# Patient Record
Sex: FEMALE | Race: AMERICAN INDIAN OR ALASKA NATIVE | NOT HISPANIC OR LATINO | Employment: UNEMPLOYED | URBAN - METROPOLITAN AREA
[De-identification: names, ages, dates, MRNs, and addresses within clinical notes are randomized per-mention and may not be internally consistent; named-entity substitution may affect disease eponyms.]

---

## 2017-03-13 ENCOUNTER — OFFICE VISIT (OUTPATIENT)
Dept: CARDIOLOGY | Facility: MEDICAL CENTER | Age: 52
End: 2017-03-13
Payer: MEDICAID

## 2017-03-13 VITALS
DIASTOLIC BLOOD PRESSURE: 80 MMHG | SYSTOLIC BLOOD PRESSURE: 110 MMHG | HEIGHT: 65 IN | BODY MASS INDEX: 33.99 KG/M2 | OXYGEN SATURATION: 92 % | HEART RATE: 66 BPM | WEIGHT: 204 LBS

## 2017-03-13 DIAGNOSIS — R07.89 OTHER CHEST PAIN: ICD-10-CM

## 2017-03-13 DIAGNOSIS — Z82.49 FAMILY HISTORY OF CORONARY ARTERY DISEASE: ICD-10-CM

## 2017-03-13 DIAGNOSIS — R94.31 NONSPECIFIC ABNORMAL ELECTROCARDIOGRAM (ECG) (EKG): ICD-10-CM

## 2017-03-13 DIAGNOSIS — Z01.810 PRE-OPERATIVE CARDIOVASCULAR EXAMINATION: ICD-10-CM

## 2017-03-13 LAB — EKG IMPRESSION: NORMAL

## 2017-03-13 PROCEDURE — 93000 ELECTROCARDIOGRAM COMPLETE: CPT | Performed by: INTERNAL MEDICINE

## 2017-03-13 PROCEDURE — 99204 OFFICE O/P NEW MOD 45 MIN: CPT | Performed by: INTERNAL MEDICINE

## 2017-03-13 RX ORDER — DILTIAZEM HYDROCHLORIDE 240 MG/1
240 CAPSULE, EXTENDED RELEASE ORAL DAILY
Status: ON HOLD | COMMUNITY
End: 2018-08-09

## 2017-03-13 ASSESSMENT — ENCOUNTER SYMPTOMS
BACK PAIN: 1
SHORTNESS OF BREATH: 1
PALPITATIONS: 1
ABDOMINAL PAIN: 1

## 2017-03-13 NOTE — Clinical Note
Name:          Sari Ceja   YOB: 1965  Date:     3/13/2017      Mayda Pfeiffer M.D.  [address] [facility] [place] [address]     Babatunde Alvarez MD  [address], [address]  [place] [address]  Phone: [phone]  Back Line: [phone]  Fax: [phone]  E-mail: [email or web address]   Dear Dr. Pfeiffer,    We had the pleasure of seeing your patient, Sari Ceja, in Cardiology Clinic at [facility] today.    As you know, she is a 51-year-old woman with a history of obesity, essential hypertension and inferior Q waves on EKG referred pre-operatively prior to knee arthroplasty.    In speaking with her today, she does tell me about some chest discomfort, which is mildly concerning for angina. In light of her essential hypertension, obesity, and family history of coronary artery disease I do think it would be prudent to check myocardial perfusion imaging prior to her upcoming knee arthroplasty. In less she has a large anterior defect, I would not delay surgery after that study as revascularization has not been shown to reduce the perioperative event rates. However, if she does have a large reversible defect on her nuclear stress test, we may need to delay the surgery. Otherwise, her blood pressure is well controlled at 110/80 mmHg in our office on the combination of lisinopril, HCTZ, and diltiazem. I would recommend holding her lisinopril for a few days prior to surgery to reduce the risk of perioperative hypotension.    We will have the patient follow-up in 3 months, sooner if needed.    Thank you for the referral and please do not hesitate to contact me at any time. My contact information is listed above.    This note was dictated using Dragon speech recognition software.     A full note including my physical examination and a full list of rectified medications is available in our medical record, and can be faxed as well.    Babatunde Alvarez MD  Cardiologist  [facility]  [facility]    cc: Marc Paredes MD

## 2017-03-13 NOTE — PROGRESS NOTES
"Subjective:   Sari Ceja is a 51  -year-old woman with a history of obesity, essential hypertension and inferior Q waves on EKG referred pre-operatively prior to knee arthroplasty.    In speaking with me today, she tells me about a dull left-sided chest discomfort that typically lasts about 30 minutes at a time and is provoked with emotional stress but nonexertional. She has had similar symptoms for the last 3 months with no real increase in frequency. They presently occur about once per month. She also tells me that she has palpitations, \"fluttering of my heart.\" She has infrequent episodes the last of which was about a month ago. Apart from that, cardiac vascular review systems is negative. She has mild, chronic exertional dyspnea that she attributes to her osteoarthritis (perhaps deconditioning). Apart from that, cardiac vascular review systems is negative.    She takes care of her blind mother and her sister who is wheelchair bound related to joint disease. She is , and has a significant other who causes her some emotional stress. She is a nonsmoker.    Past Medical History   Diagnosis Date   • Arthritis    • Hypertension    • Dental disorder      Broken teeth   • Pain      both knees, right shoulder, hip area   • Renal disorder 2005     kidney infection   • Unspecified hemorrhagic conditions (CMS-HCC) 2005     \"pulled IV from groin, had to hold pressure to stop bleeding and stayed additional day in the hospital\"     Past Surgical History   Procedure Laterality Date   • Incision and drainage general  1993     from infected tooth   • Hip arthroscopy  12/5/2013     Performed by Doni Merida M.D. at [facility]   • Debridement  12/5/2013     Performed by Doni Merida M.D. at [facility]   • Orthopedic osteotomy  12/5/2013     Performed by Doni Merida M.D. at [facility]     Family History   Problem Relation Age of Onset   • Heart Disease     • " "Hypertension     • Cancer       History   Smoking status   • Former Smoker -- 0.60 packs/day for 10 years   • Quit date: 04/01/2006   Smokeless tobacco   • Never Used     No Known Allergies  Outpatient Encounter Prescriptions as of 3/13/2017   Medication Sig Dispense Refill   • diltiazem (TAZTIA XT) 240 MG SR capsule Take 240 mg by mouth every day.     • Pregabalin (LYRICA PO) Take  by mouth.     • Cholecalciferol 2000 UNIT TABS Take  by mouth every day.     • cyclobenzaprine (FLEXERIL) 10 MG TABS Take 10 mg by mouth 3 times a day as needed.     • Multiple Vitamins-Minerals (ONE-A-DAY WOMENS 50+ ADVANTAGE PO) Take  by mouth every day.     • hydrocodone/acetaminophen (NORCO)  MG TABS Take 1-2 Tabs by mouth every 6 hours as needed.     • lisinopril (PRINIVIL) 20 MG TABS Take 20 mg by mouth 1 time daily as needed.     • hydrochlorothiazide (HYDRODIURIL) 25 MG TABS Take 25 mg by mouth every day.     • diltiazem CD (CARDIZEM CD) 240 MG CP24 Take 240 mg by mouth every day.     • gabapentin (NEURONTIN) 600 MG tablet Take 600 mg by mouth 3 times a day.     • nabumetone (RELAFEN) 500 MG TABS Take 500 mg by mouth as needed.       No facility-administered encounter medications on file as of 3/13/2017.     Review of Systems   Respiratory: Positive for shortness of breath.    Cardiovascular: Positive for chest pain and palpitations.   Gastrointestinal: Positive for abdominal pain.   Musculoskeletal: Positive for back pain and joint pain.   All other systems reviewed and are negative.       Objective:   /80 mmHg  Pulse 66  Ht 1.651 m (5' 5\")  Wt 92.534 kg (204 lb)  BMI 33.95 kg/m2  SpO2 92%    Physical Exam   Constitutional: She is oriented to person, place, and time. She appears well-developed and well-nourished. No distress.   Pleasant, obese, middle-age woman in no distress   HENT:   Head: Normocephalic and atraumatic.   Eyes: Conjunctivae and EOM are normal. Pupils are equal, round, and reactive to light. No " scleral icterus.   Neck: Neck supple. No JVD present. No tracheal deviation present.   Cardiovascular: Normal rate, regular rhythm, normal heart sounds and intact distal pulses.  Exam reveals no gallop and no friction rub.    No murmur heard.  Pulses:       Dorsalis pedis pulses are 2+ on the right side, and 2+ on the left side.   No carotid bruits   Pulmonary/Chest: Effort normal and breath sounds normal. No stridor. No respiratory distress. She has no wheezes. She has no rales.   Abdominal: Soft. Bowel sounds are normal. She exhibits no distension.   Musculoskeletal: She exhibits no edema.   Longitudinal anterior scar noted on her left knee well-healed without any signs of infection. Right knee brace noted as well. Full exam deferred.   Neurological: She is alert and oriented to person, place, and time.   Skin: Skin is warm and dry. No rash noted. She is not diaphoretic. No erythema. No pallor.   Psychiatric: She has a normal mood and affect. Judgment and thought content normal.   Vitals reviewed.      Assessment:     1. Nonspecific abnormal electrocardiogram (ECG) (EKG)  EKG    NM-CARDIAC STRESS TEST   2. Other chest pain  NM-CARDIAC STRESS TEST   3. Pre-operative cardiovascular examination  NM-CARDIAC STRESS TEST   4. Family history of coronary artery disease         Medical Decision Making:  Today's Assessment / Status / Plan:     Medical Decision Making:    In speaking with her today, she does tell me about some chest discomfort, which is mildly concerning for angina. In light of her essential hypertension, obesity, and family history of coronary artery disease I do think it would be prudent to check myocardial perfusion imaging prior to her upcoming knee arthroplasty. In less she has a large anterior defect, I would not delay surgery after that study as revascularization has not been shown to reduce the perioperative event rates. However, if she does have a large reversible defect on her nuclear stress test,  we may need to delay the surgery. Otherwise, her blood pressure is well controlled at 110/80 mmHg in our office on the combination of lisinopril, HCTZ, and diltiazem. I would recommend holding her lisinopril for a few days prior to surgery to reduce the risk of perioperative hypotension.    Babatunde Alvarez MD  Cardiologist, [facility] [place]

## 2017-03-13 NOTE — PROGRESS NOTES
Name:          Sari Ceja   YOB: 1965  Date:     3/13/2017      Mayda Pfeiffer M.D.  [address] [facility] [place] [address]     Babatunde Alvarez MD  [address], [address]  [place] [address]  Phone: [phone]  Back Line: [phone]  Fax: [phone]  E-mail: [email or web address]   Dear Dr. Pfeiffer,    We had the pleasure of seeing your patient, Sari Ceja, in Cardiology Clinic at [facility] today.    As you know, she is a 51-year-old woman with a history of obesity, essential hypertension and inferior Q waves on EKG referred pre-operatively prior to knee arthroplasty.    In speaking with her today, she does tell me about some chest discomfort, which is mildly concerning for angina. In light of her essential hypertension, obesity, and family history of coronary artery disease I do think it would be prudent to check myocardial perfusion imaging prior to her upcoming knee arthroplasty. In less she has a large anterior defect, I would not delay surgery after that study as revascularization has not been shown to reduce the perioperative event rates. However, if she does have a large reversible defect on her nuclear stress test, we may need to delay the surgery. Otherwise, her blood pressure is well controlled at 110/80 mmHg in our office on the combination of lisinopril, HCTZ, and diltiazem. I would recommend holding her lisinopril for a few days prior to surgery to reduce the risk of perioperative hypotension.    We will have the patient follow-up in 3 months, sooner if needed.    Thank you for the referral and please do not hesitate to contact me at any time. My contact information is listed above.    This note was dictated using Dragon speech recognition software.     A full note including my physical examination and a full list of rectified medications is available in our medical record, and can be faxed as well.    Babatunde Alvarez MD  Cardiologist  [facility] for Heart  and Vascular Health

## 2017-03-27 PROBLEM — I10 ESSENTIAL HYPERTENSION: Status: ACTIVE | Noted: 2017-03-27

## 2017-04-08 ENCOUNTER — HOSPITAL ENCOUNTER (OUTPATIENT)
Dept: RADIOLOGY | Facility: MEDICAL CENTER | Age: 52
End: 2017-04-08
Attending: INTERNAL MEDICINE
Payer: MEDICAID

## 2017-04-08 DIAGNOSIS — R07.89 OTHER CHEST PAIN: ICD-10-CM

## 2017-04-08 DIAGNOSIS — R94.31 NONSPECIFIC ABNORMAL ELECTROCARDIOGRAM (ECG) (EKG): ICD-10-CM

## 2017-04-08 DIAGNOSIS — Z01.810 PRE-OPERATIVE CARDIOVASCULAR EXAMINATION: ICD-10-CM

## 2017-04-08 PROCEDURE — 700111 HCHG RX REV CODE 636 W/ 250 OVERRIDE (IP)

## 2017-04-08 PROCEDURE — A9502 TC99M TETROFOSMIN: HCPCS

## 2017-04-08 RX ORDER — REGADENOSON 0.08 MG/ML
INJECTION, SOLUTION INTRAVENOUS
Status: COMPLETED
Start: 2017-04-08 | End: 2017-04-08

## 2017-04-08 RX ADMIN — REGADENOSON 0.4 MG: 0.08 INJECTION, SOLUTION INTRAVENOUS at 09:47

## 2017-04-10 ENCOUNTER — OFFICE VISIT (OUTPATIENT)
Dept: CARDIOLOGY | Facility: MEDICAL CENTER | Age: 52
End: 2017-04-10
Payer: MEDICAID

## 2017-04-10 VITALS
HEART RATE: 68 BPM | DIASTOLIC BLOOD PRESSURE: 88 MMHG | WEIGHT: 201 LBS | OXYGEN SATURATION: 93 % | SYSTOLIC BLOOD PRESSURE: 136 MMHG | BODY MASS INDEX: 33.49 KG/M2 | HEIGHT: 65 IN

## 2017-04-10 DIAGNOSIS — Z82.49 FAMILY HISTORY OF CORONARY ARTERY DISEASE: ICD-10-CM

## 2017-04-10 DIAGNOSIS — R00.2 PALPITATIONS: ICD-10-CM

## 2017-04-10 DIAGNOSIS — M25.561 ARTHRALGIA OF KNEE, RIGHT: ICD-10-CM

## 2017-04-10 DIAGNOSIS — I10 ESSENTIAL HYPERTENSION: ICD-10-CM

## 2017-04-10 PROCEDURE — 99214 OFFICE O/P EST MOD 30 MIN: CPT | Performed by: NURSE PRACTITIONER

## 2017-04-10 RX ORDER — DICLOFENAC SODIUM 75 MG/1
75 TABLET, DELAYED RELEASE ORAL 2 TIMES DAILY
Refills: 2 | Status: ON HOLD | COMMUNITY
Start: 2017-02-28 | End: 2018-07-20

## 2017-04-10 ASSESSMENT — ENCOUNTER SYMPTOMS
ABDOMINAL PAIN: 0
WEAKNESS: 0
CLAUDICATION: 0
MYALGIAS: 0
BACK PAIN: 1
DIZZINESS: 0
PND: 0
PALPITATIONS: 1
SHORTNESS OF BREATH: 0
ORTHOPNEA: 0
COUGH: 0

## 2017-04-10 NOTE — PROGRESS NOTES
"Subjective:   Sari Ceja is a 51 y.o. female who presents today for surgical clearance. She is planning on undergoing a right knee replacement by Dr. Paredes but no surgery date is set. She was seen by Dr. Babatunde Alvarez back in March for evaluation. She had complained of some minor exertional chest discomfort and some palpitations. Since she has family history of heart disease he ordered a stress test to be done prior to her surgery. She is here for the results.    She states she has a fluttering sensation in her chest that last only a few seconds which usually occurs well she is driving. She has some minor chest discomfort with exertion but no associated symptoms.    She complains of pain in her right knee and takes anti-inflammatory. She has already had her left knee replaced. She does have some chronic back pain for which she will see Dr. Mayes for epidural injections.    Past Medical History   Diagnosis Date   • Arthritis    • Hypertension    • Dental disorder      Broken teeth   • Pain      both knees, right shoulder, hip area   • Renal disorder 2005     kidney infection   • Unspecified hemorrhagic conditions (CMS-HCC) 2005     \"pulled IV from groin, had to hold pressure to stop bleeding and stayed additional day in the hospital\"     Past Surgical History   Procedure Laterality Date   • Incision and drainage general  1993     from infected tooth   • Hip arthroscopy  12/5/2013     Performed by Doni Merida M.D. at [facility]   • Debridement  12/5/2013     Performed by Doni Merida M.D. at [facility]   • Orthopedic osteotomy  12/5/2013     Performed by Doni Merida M.D. at [facility]     Family History   Problem Relation Age of Onset   • Heart Disease     • Hypertension     • Cancer       History   Smoking status   • Former Smoker -- 0.60 packs/day for 10 years   • Quit date: 04/01/2006   Smokeless tobacco   • Never Used     No Known Allergies  Outpatient " "Encounter Prescriptions as of 4/10/2017   Medication Sig Dispense Refill   • diclofenac EC (VOLTAREN) 75 MG Tablet Delayed Response   2   • LYRICA 150 MG Cap   2   • hydrocodone/acetaminophen (NORCO)  MG TABS Take 1-2 Tabs by mouth every 6 hours as needed.     • lisinopril (PRINIVIL) 20 MG TABS Take 20 mg by mouth 1 time daily as needed.     • hydrochlorothiazide (HYDRODIURIL) 25 MG TABS Take 25 mg by mouth every day.     • diltiazem (TAZTIA XT) 240 MG SR capsule Take 240 mg by mouth every day.     • [DISCONTINUED] Pregabalin (LYRICA PO) Take  by mouth.     • Cholecalciferol 2000 UNIT TABS Take  by mouth every day.     • cyclobenzaprine (FLEXERIL) 10 MG TABS Take 10 mg by mouth 3 times a day as needed.     • Multiple Vitamins-Minerals (ONE-A-DAY WOMENS 50+ ADVANTAGE PO) Take  by mouth every day.     • [DISCONTINUED] gabapentin (NEURONTIN) 600 MG tablet Take 600 mg by mouth 3 times a day.     • [DISCONTINUED] nabumetone (RELAFEN) 500 MG TABS Take 500 mg by mouth as needed.       No facility-administered encounter medications on file as of 4/10/2017.     Review of Systems   Constitutional: Negative for malaise/fatigue.   Respiratory: Negative for cough and shortness of breath.    Cardiovascular: Positive for palpitations (rare fluttering.). Negative for chest pain, orthopnea, claudication, leg swelling and PND.   Gastrointestinal: Negative for abdominal pain.   Musculoskeletal: Positive for back pain (chronic , gets epidurals.) and joint pain (right knee arthritis.). Negative for myalgias.   Neurological: Negative for dizziness and weakness.        Objective:   /88 mmHg  Pulse 68  Ht 1.651 m (5' 5\")  Wt 91.173 kg (201 lb)  BMI 33.45 kg/m2  SpO2 93%    Physical Exam   Constitutional: She is oriented to person, place, and time. She appears well-developed and well-nourished.   HENT:   Head: Normocephalic.   Eyes: Conjunctivae are normal.   Neck: No JVD present. No thyromegaly present.   Cardiovascular: " Normal rate, regular rhythm, S1 normal, S2 normal and normal heart sounds.  Exam reveals no gallop, no S3, no S4 and no friction rub.    No murmur heard.  Pulmonary/Chest: Effort normal and breath sounds normal. No respiratory distress. She has no wheezes. She has no rales.   Abdominal: Soft. Bowel sounds are normal. She exhibits no distension. There is no tenderness.   Central obesity.   Musculoskeletal: She exhibits no edema.   Neurological: She is alert and oriented to person, place, and time.   Skin: Skin is warm and dry.   Psychiatric: She has a normal mood and affect.     April 8, 2017: Myocardial Perfusion Report:   NUCLEAR IMAGING INTERPRETATION   No reversible defects that would indicate ischemia.    Normal left ventricular wall motion, with EF of 71%.    ECG INTERPRETATION   Negative stress ECG for ischemia.    Assessment:     1. Palpitations     2. Family history of coronary artery disease     3. Essential hypertension     4. Arthralgia of knee, right         Medical Decision Making:  Today's Assessment / Status / Plan:     Palpitations: She is a rare fluttering sensation in her chest which sounds like it may be ectopy. She's not had any rapid, sustained heartbeats.    Family history of coronary artery disease: Patient had some minor chest discomfort and a fluttering sensation in her chest therefore she underwent a nuclear stress test which was negative for ischemia.    Hypertension: Her blood pressure is office today. Continue on current regimen. Per Dr. Babatunde Alvarez he suggested that she hold her lisinopril prior to surgery.    Right knee pain: She is planning on undergoing a right knee replacement with Dr. Paredes. I consulted with Dr. Julianna Alvarez who agrees that the patient can undergo her knee surgery and she is at low risk.    She may undergo her right knee replacement. If there are any cardiovascular problems or complications they are free to contact our office. She does not have a true cardiac  problem that we would need to follow therefore she will follow-up as needed. However she isn't was welcome to return to our practice if necessary.    Collaborating Provider: Dr. Julianna Alvarez.

## 2017-04-10 NOTE — MR AVS SNAPSHOT
"        Sari Ceja   4/10/2017 1:20 PM   Office Visit   MRN: [id number]    Department:  [facility]   Dept Phone:  [phone]    Description:  Female : 1965   Provider:  JOVANA Ziegler           Reason for Visit     Medical Clearance           Allergies as of 4/10/2017     No Known Allergies      You were diagnosed with     Arthralgia of knee, right   [923181]       Essential hypertension   [1940458]         Vital Signs     Blood Pressure Pulse Height Weight Body Mass Index Oxygen Saturation    136/88 mmHg 68 1.651 m (5' 5\") 91.173 kg (201 lb) 33.45 kg/m2 93%    Smoking Status                   Former Smoker           Basic Information     Date Of Birth Sex Race Ethnicity Preferred Language    1965 Female White Non- English      Your appointments     2017 12:45 PM   FOLLOW UP with Babatunde Alvarez M.D.   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      Below and/or attached are the medications your provider expects you to take. Review all of your home medications and newly ordered medications with your provider and/or pharmacist. Follow medication instructions as directed by your provider and/or pharmacist. Please keep your " medication list with you and share with your provider. Update the information when medications are discontinued, doses are changed, or new medications (including over-the-counter products) are added; and carry medication information at all times in the event of emergency situations     Allergies:  No Known Allergies          Medications  Valid as of: April 10, 2017 -  2:24 PM    Generic Name Brand Name Tablet Size Instructions for use    Cholecalciferol (Tab) Cholecalciferol 2000 UNIT Take  by mouth every day.        Cyclobenzaprine HCl (Tab) FLEXERIL 10 MG Take 10 mg by mouth 3 times a day as needed.        Diclofenac Sodium (Tablet Delayed Response) VOLTAREN 75 MG         DiltiaZEM HCl ER Beads (CAPSULE SR 24 HR) TIAZAC 240 MG Take 240 mg by mouth every day.        HydroCHLOROthiazide (Tab) HYDRODIURIL 25 MG Take 25 mg by mouth every day.        Hydrocodone-Acetaminophen (Tab) NORCO  MG Take 1-2 Tabs by mouth every 6 hours as needed.        Lisinopril (Tab) PRINIVIL 20 MG Take 20 mg by mouth 1 time daily as needed.        Multiple Vitamins-Minerals   Take  by mouth every day.        Pregabalin (Cap) LYRICA 150 MG         .                 Medicines prescribed today were sent to:     [facility] [place] - [address]    [address] [place] [address]    Phone: [phone] Fax: [phone]    Open 24 Hours?: No      Medication refill instructions:       If your prescription bottle indicates you have medication refills left, it is not necessary to call your provider’s office. Please contact your pharmacy and they will refill your medication.    If your prescription bottle indicates you do not have any refills left, you may request refills at any time through one of the following ways: The online [employer] system (except Urgent Care), by calling your provider’s office, or by asking your pharmacy to contact your provider’s office with a refill request. Medication  refills are processed only during regular business hours and may not be available until the next business day. Your provider may request additional information or to have a follow-up visit with you prior to refilling your medication.   *Please Note: Medication refills are assigned a new Rx number when refilled electronically. Your pharmacy may indicate that no refills were authorized even though a new prescription for the same medication is available at the pharmacy. Please request the medicine by name with the pharmacy before contacting your provider for a refill.           [employer] Access Code: Activation code not generated  Current [employer] Status: Active

## 2017-04-10 NOTE — Clinical Note
"     [facility]   [address], [address]  [place] [address]  Phone: [phone]  Fax: [phone]              Sari Ceja  1965    Encounter Date: 4/10/2017    JOVANA Ziegler          PROGRESS NOTE:  Subjective:   Sari Ceja is a 51 y.o. female who presents today for surgical clearance. She is planning on undergoing a right knee replacement by Dr. Paredes but no surgery date is set. She was seen by Dr. Babatunde Alvarez back in March for evaluation. She had complained of some minor exertional chest discomfort and some palpitations. Since she has family history of heart disease he ordered a stress test to be done prior to her surgery. She is here for the results.    She states she has a fluttering sensation in her chest that last only a few seconds which usually occurs well she is driving. She has some minor chest discomfort with exertion but no associated symptoms.    She complains of pain in her right knee and takes anti-inflammatory. She has already had her left knee replaced. She does have some chronic back pain for which she will see Dr. Mayes for epidural injections.    Past Medical History   Diagnosis Date   • Arthritis    • Hypertension    • Dental disorder      Broken teeth   • Pain      both knees, right shoulder, hip area   • Renal disorder 2005     kidney infection   • Unspecified hemorrhagic conditions (CMS-HCC) 2005     \"pulled IV from groin, had to hold pressure to stop bleeding and stayed additional day in the hospital\"     Past Surgical History   Procedure Laterality Date   • Incision and drainage general  1993     from infected tooth   • Hip arthroscopy  12/5/2013     Performed by Doni Merida M.D. at [facility]   • Debridement  12/5/2013     Performed by Doni Merida M.D. at [facility]   • Orthopedic osteotomy  12/5/2013     Performed by Doni Merida M.D. at [facility]     Family " "History   Problem Relation Age of Onset   • Heart Disease     • Hypertension     • Cancer       History   Smoking status   • Former Smoker -- 0.60 packs/day for 10 years   • Quit date: 04/01/2006   Smokeless tobacco   • Never Used     No Known Allergies  Outpatient Encounter Prescriptions as of 4/10/2017   Medication Sig Dispense Refill   • diclofenac EC (VOLTAREN) 75 MG Tablet Delayed Response   2   • LYRICA 150 MG Cap   2   • hydrocodone/acetaminophen (NORCO)  MG TABS Take 1-2 Tabs by mouth every 6 hours as needed.     • lisinopril (PRINIVIL) 20 MG TABS Take 20 mg by mouth 1 time daily as needed.     • hydrochlorothiazide (HYDRODIURIL) 25 MG TABS Take 25 mg by mouth every day.     • diltiazem (TAZTIA XT) 240 MG SR capsule Take 240 mg by mouth every day.     • [DISCONTINUED] Pregabalin (LYRICA PO) Take  by mouth.     • Cholecalciferol 2000 UNIT TABS Take  by mouth every day.     • cyclobenzaprine (FLEXERIL) 10 MG TABS Take 10 mg by mouth 3 times a day as needed.     • Multiple Vitamins-Minerals (ONE-A-DAY WOMENS 50+ ADVANTAGE PO) Take  by mouth every day.     • [DISCONTINUED] gabapentin (NEURONTIN) 600 MG tablet Take 600 mg by mouth 3 times a day.     • [DISCONTINUED] nabumetone (RELAFEN) 500 MG TABS Take 500 mg by mouth as needed.       No facility-administered encounter medications on file as of 4/10/2017.     Review of Systems   Constitutional: Negative for malaise/fatigue.   Respiratory: Negative for cough and shortness of breath.    Cardiovascular: Positive for palpitations (rare fluttering.). Negative for chest pain, orthopnea, claudication, leg swelling and PND.   Gastrointestinal: Negative for abdominal pain.   Musculoskeletal: Positive for back pain (chronic , gets epidurals.) and joint pain (right knee arthritis.). Negative for myalgias.   Neurological: Negative for dizziness and weakness.        Objective:   /88 mmHg  Pulse 68  Ht 1.651 m (5' 5\")  Wt 91.173 kg (201 lb)  BMI 33.45 kg/m2  " SpO2 93%    Physical Exam   Constitutional: She is oriented to person, place, and time. She appears well-developed and well-nourished.   HENT:   Head: Normocephalic.   Eyes: Conjunctivae are normal.   Neck: No JVD present. No thyromegaly present.   Cardiovascular: Normal rate, regular rhythm, S1 normal, S2 normal and normal heart sounds.  Exam reveals no gallop, no S3, no S4 and no friction rub.    No murmur heard.  Pulmonary/Chest: Effort normal and breath sounds normal. No respiratory distress. She has no wheezes. She has no rales.   Abdominal: Soft. Bowel sounds are normal. She exhibits no distension. There is no tenderness.   Central obesity.   Musculoskeletal: She exhibits no edema.   Neurological: She is alert and oriented to person, place, and time.   Skin: Skin is warm and dry.   Psychiatric: She has a normal mood and affect.     April 8, 2017: Myocardial Perfusion Report:   NUCLEAR IMAGING INTERPRETATION   No reversible defects that would indicate ischemia.    Normal left ventricular wall motion, with EF of 71%.    ECG INTERPRETATION   Negative stress ECG for ischemia.    Assessment:     1. Palpitations     2. Family history of coronary artery disease     3. Essential hypertension     4. Arthralgia of knee, right         Medical Decision Making:  Today's Assessment / Status / Plan:     Palpitations: She is a rare fluttering sensation in her chest which sounds like it may be ectopy. She's not had any rapid, sustained heartbeats.    Family history of coronary artery disease: Patient had some minor chest discomfort and a fluttering sensation in her chest therefore she underwent a nuclear stress test which was negative for ischemia.    Hypertension: Her blood pressure is office today. Continue on current regimen. Per Dr. Babatunde Alvarez he suggested that she hold her lisinopril prior to surgery.    Right knee pain: She is planning on undergoing a right knee replacement with Dr. Paredes. I consulted with Dr. Levine  Antonio who agrees that the patient can undergo her knee surgery and she is at low risk.    She may undergo her right knee replacement. If there are any cardiovascular problems or complications they are free to contact our office. She does not have a true cardiac problem that we would need to follow therefore she will follow-up as needed. However she isn't was welcome to return to our practice if necessary.    Collaborating Provider: Dr. Julianna Alvarez.        No Recipients

## 2018-05-17 ENCOUNTER — HOSPITAL ENCOUNTER (OUTPATIENT)
Dept: RADIOLOGY | Facility: MEDICAL CENTER | Age: 53
End: 2018-05-17
Attending: PHYSICAL MEDICINE & REHABILITATION
Payer: MEDICAID

## 2018-05-17 DIAGNOSIS — M54.16 LUMBAR RADICULITIS: ICD-10-CM

## 2018-05-17 PROCEDURE — 72148 MRI LUMBAR SPINE W/O DYE: CPT

## 2018-07-12 ENCOUNTER — HOSPITAL ENCOUNTER (OUTPATIENT)
Dept: RADIOLOGY | Facility: MEDICAL CENTER | Age: 53
End: 2018-07-12
Attending: NEUROLOGICAL SURGERY
Payer: MEDICAID

## 2018-07-12 ENCOUNTER — HOSPITAL ENCOUNTER (OUTPATIENT)
Dept: RADIOLOGY | Facility: MEDICAL CENTER | Age: 53
End: 2018-07-12
Attending: NEUROLOGICAL SURGERY | Admitting: NEUROLOGICAL SURGERY
Payer: MEDICAID

## 2018-07-12 DIAGNOSIS — Z01.810 PRE-OPERATIVE CARDIOVASCULAR EXAMINATION: ICD-10-CM

## 2018-07-12 DIAGNOSIS — Z01.811 PRE-OPERATIVE RESPIRATORY EXAMINATION: ICD-10-CM

## 2018-07-12 DIAGNOSIS — M48.062 PSEUDOCLAUDICATION SYNDROME: ICD-10-CM

## 2018-07-12 DIAGNOSIS — Z01.812 PRE-OPERATIVE LABORATORY EXAMINATION: ICD-10-CM

## 2018-07-12 LAB
ABO GROUP BLD: NORMAL
ANION GAP SERPL CALC-SCNC: 8 MMOL/L (ref 0–11.9)
APPEARANCE UR: CLEAR
APTT PPP: 30.8 SEC (ref 24.7–36)
BASOPHILS # BLD AUTO: 0.7 % (ref 0–1.8)
BASOPHILS # BLD: 0.05 K/UL (ref 0–0.12)
BILIRUB UR QL STRIP.AUTO: NEGATIVE
BLD GP AB SCN SERPL QL: NORMAL
BUN SERPL-MCNC: 15 MG/DL (ref 8–22)
CALCIUM SERPL-MCNC: 9.5 MG/DL (ref 8.5–10.5)
CHLORIDE SERPL-SCNC: 104 MMOL/L (ref 96–112)
CO2 SERPL-SCNC: 26 MMOL/L (ref 20–33)
COLOR UR: YELLOW
CREAT SERPL-MCNC: 0.83 MG/DL (ref 0.5–1.4)
EKG IMPRESSION: NORMAL
EOSINOPHIL # BLD AUTO: 0.3 K/UL (ref 0–0.51)
EOSINOPHIL NFR BLD: 4.1 % (ref 0–6.9)
ERYTHROCYTE [DISTWIDTH] IN BLOOD BY AUTOMATED COUNT: 40.6 FL (ref 35.9–50)
GLUCOSE SERPL-MCNC: 105 MG/DL (ref 65–99)
GLUCOSE UR STRIP.AUTO-MCNC: NEGATIVE MG/DL
HCT VFR BLD AUTO: 43.3 % (ref 37–47)
HGB BLD-MCNC: 15.1 G/DL (ref 12–16)
IMM GRANULOCYTES # BLD AUTO: 0.05 K/UL (ref 0–0.11)
IMM GRANULOCYTES NFR BLD AUTO: 0.7 % (ref 0–0.9)
INR PPP: 1.04 (ref 0.87–1.13)
KETONES UR STRIP.AUTO-MCNC: ABNORMAL MG/DL
LEUKOCYTE ESTERASE UR QL STRIP.AUTO: NEGATIVE
LYMPHOCYTES # BLD AUTO: 2.29 K/UL (ref 1–4.8)
LYMPHOCYTES NFR BLD: 31 % (ref 22–41)
MCH RBC QN AUTO: 30.6 PG (ref 27–33)
MCHC RBC AUTO-ENTMCNC: 34.9 G/DL (ref 33.6–35)
MCV RBC AUTO: 87.7 FL (ref 81.4–97.8)
MICRO URNS: ABNORMAL
MONOCYTES # BLD AUTO: 0.52 K/UL (ref 0–0.85)
MONOCYTES NFR BLD AUTO: 7 % (ref 0–13.4)
NEUTROPHILS # BLD AUTO: 4.18 K/UL (ref 2–7.15)
NEUTROPHILS NFR BLD: 56.5 % (ref 44–72)
NITRITE UR QL STRIP.AUTO: NEGATIVE
NRBC # BLD AUTO: 0 K/UL
NRBC BLD-RTO: 0 /100 WBC
PH UR STRIP.AUTO: 6 [PH]
PLATELET # BLD AUTO: 287 K/UL (ref 164–446)
PMV BLD AUTO: 11.1 FL (ref 9–12.9)
POTASSIUM SERPL-SCNC: 3.6 MMOL/L (ref 3.6–5.5)
PROT UR QL STRIP: NEGATIVE MG/DL
PROTHROMBIN TIME: 13.3 SEC (ref 12–14.6)
RBC # BLD AUTO: 4.94 M/UL (ref 4.2–5.4)
RBC UR QL AUTO: NEGATIVE
RH BLD: NORMAL
SODIUM SERPL-SCNC: 138 MMOL/L (ref 135–145)
SP GR UR STRIP.AUTO: 1.02
UROBILINOGEN UR STRIP.AUTO-MCNC: 1 MG/DL
WBC # BLD AUTO: 7.4 K/UL (ref 4.8–10.8)

## 2018-07-12 PROCEDURE — 85730 THROMBOPLASTIN TIME PARTIAL: CPT

## 2018-07-12 PROCEDURE — 86900 BLOOD TYPING SEROLOGIC ABO: CPT

## 2018-07-12 PROCEDURE — 80048 BASIC METABOLIC PNL TOTAL CA: CPT

## 2018-07-12 PROCEDURE — 81003 URINALYSIS AUTO W/O SCOPE: CPT

## 2018-07-12 PROCEDURE — 86850 RBC ANTIBODY SCREEN: CPT

## 2018-07-12 PROCEDURE — 86901 BLOOD TYPING SEROLOGIC RH(D): CPT

## 2018-07-12 PROCEDURE — 93010 ELECTROCARDIOGRAM REPORT: CPT | Performed by: INTERNAL MEDICINE

## 2018-07-12 PROCEDURE — 93005 ELECTROCARDIOGRAM TRACING: CPT

## 2018-07-12 PROCEDURE — 85025 COMPLETE CBC W/AUTO DIFF WBC: CPT

## 2018-07-12 PROCEDURE — 85610 PROTHROMBIN TIME: CPT

## 2018-07-12 PROCEDURE — 36415 COLL VENOUS BLD VENIPUNCTURE: CPT

## 2018-07-12 PROCEDURE — 72110 X-RAY EXAM L-2 SPINE 4/>VWS: CPT

## 2018-07-12 RX ORDER — GABAPENTIN 600 MG/1
600 TABLET ORAL 3 TIMES DAILY
Status: ON HOLD | COMMUNITY
End: 2018-08-09

## 2018-07-13 ENCOUNTER — HOSPITAL ENCOUNTER (OUTPATIENT)
Dept: RADIOLOGY | Facility: MEDICAL CENTER | Age: 53
End: 2018-07-13
Attending: NEUROLOGICAL SURGERY
Payer: MEDICAID

## 2018-07-13 DIAGNOSIS — M48.062 SPINAL STENOSIS OF LUMBAR REGION WITH NEUROGENIC CLAUDICATION: ICD-10-CM

## 2018-07-13 PROCEDURE — 93970 EXTREMITY STUDY: CPT

## 2018-07-13 PROCEDURE — 93970 EXTREMITY STUDY: CPT | Mod: 26 | Performed by: SURGERY

## 2018-07-17 ENCOUNTER — APPOINTMENT (OUTPATIENT)
Dept: RADIOLOGY | Facility: MEDICAL CENTER | Age: 53
End: 2018-07-17
Attending: NEUROLOGICAL SURGERY
Payer: MEDICAID

## 2018-07-17 ENCOUNTER — HOSPITAL ENCOUNTER (OUTPATIENT)
Facility: MEDICAL CENTER | Age: 53
End: 2018-07-23
Attending: NEUROLOGICAL SURGERY | Admitting: NEUROLOGICAL SURGERY
Payer: MEDICAID

## 2018-07-17 DIAGNOSIS — M48.062 LUMBAR STENOSIS WITH NEUROGENIC CLAUDICATION: ICD-10-CM

## 2018-07-17 PROCEDURE — 700111 HCHG RX REV CODE 636 W/ 250 OVERRIDE (IP)

## 2018-07-17 PROCEDURE — 500367 HCHG DRAIN KIT, HEMOVAC: Performed by: NEUROLOGICAL SURGERY

## 2018-07-17 PROCEDURE — 700101 HCHG RX REV CODE 250: Performed by: CLINICAL NURSE SPECIALIST

## 2018-07-17 PROCEDURE — A9270 NON-COVERED ITEM OR SERVICE: HCPCS | Performed by: CLINICAL NURSE SPECIALIST

## 2018-07-17 PROCEDURE — 700101 HCHG RX REV CODE 250

## 2018-07-17 PROCEDURE — L0637 LSO SC R ANT/POS PNL PRE CST: HCPCS

## 2018-07-17 PROCEDURE — A9270 NON-COVERED ITEM OR SERVICE: HCPCS

## 2018-07-17 PROCEDURE — 110454 HCHG SHELL REV 250: Performed by: NEUROLOGICAL SURGERY

## 2018-07-17 PROCEDURE — 160009 HCHG ANES TIME/MIN: Performed by: NEUROLOGICAL SURGERY

## 2018-07-17 PROCEDURE — 96375 TX/PRO/DX INJ NEW DRUG ADDON: CPT

## 2018-07-17 PROCEDURE — 700112 HCHG RX REV CODE 229: Performed by: CLINICAL NURSE SPECIALIST

## 2018-07-17 PROCEDURE — 500885 HCHG PACK, JACKSON TABLE: Performed by: NEUROLOGICAL SURGERY

## 2018-07-17 PROCEDURE — 160002 HCHG RECOVERY MINUTES (STAT): Performed by: NEUROLOGICAL SURGERY

## 2018-07-17 PROCEDURE — A4314 CATH W/DRAINAGE 2-WAY LATEX: HCPCS | Performed by: NEUROLOGICAL SURGERY

## 2018-07-17 PROCEDURE — 700111 HCHG RX REV CODE 636 W/ 250 OVERRIDE (IP): Performed by: CLINICAL NURSE SPECIALIST

## 2018-07-17 PROCEDURE — 72020 X-RAY EXAM OF SPINE 1 VIEW: CPT

## 2018-07-17 PROCEDURE — 160042 HCHG SURGERY MINUTES - EA ADDL 1 MIN LEVEL 5: Performed by: NEUROLOGICAL SURGERY

## 2018-07-17 PROCEDURE — 160048 HCHG OR STATISTICAL LEVEL 1-5: Performed by: NEUROLOGICAL SURGERY

## 2018-07-17 PROCEDURE — 501838 HCHG SUTURE GENERAL: Performed by: NEUROLOGICAL SURGERY

## 2018-07-17 PROCEDURE — 700102 HCHG RX REV CODE 250 W/ 637 OVERRIDE(OP): Performed by: CLINICAL NURSE SPECIALIST

## 2018-07-17 PROCEDURE — 96374 THER/PROPH/DIAG INJ IV PUSH: CPT

## 2018-07-17 PROCEDURE — 700102 HCHG RX REV CODE 250 W/ 637 OVERRIDE(OP)

## 2018-07-17 PROCEDURE — G0378 HOSPITAL OBSERVATION PER HR: HCPCS

## 2018-07-17 PROCEDURE — A6402 STERILE GAUZE <= 16 SQ IN: HCPCS | Performed by: NEUROLOGICAL SURGERY

## 2018-07-17 PROCEDURE — 160035 HCHG PACU - 1ST 60 MINS PHASE I: Performed by: NEUROLOGICAL SURGERY

## 2018-07-17 PROCEDURE — 160036 HCHG PACU - EA ADDL 30 MINS PHASE I: Performed by: NEUROLOGICAL SURGERY

## 2018-07-17 PROCEDURE — 160031 HCHG SURGERY MINUTES - 1ST 30 MINS LEVEL 5: Performed by: NEUROLOGICAL SURGERY

## 2018-07-17 PROCEDURE — 500122 HCHG BOVIE, BLADE: Performed by: NEUROLOGICAL SURGERY

## 2018-07-17 PROCEDURE — C1713 ANCHOR/SCREW BN/BN,TIS/BN: HCPCS | Performed by: NEUROLOGICAL SURGERY

## 2018-07-17 PROCEDURE — 500002 HCHG ADHESIVE, DERMABOND: Performed by: NEUROLOGICAL SURGERY

## 2018-07-17 DEVICE — GRAFT BONE MAGNIFUSE 1X10CM: Type: IMPLANTABLE DEVICE | Site: SPINE LUMBAR | Status: FUNCTIONAL

## 2018-07-17 RX ORDER — BISACODYL 10 MG
10 SUPPOSITORY, RECTAL RECTAL
Status: DISCONTINUED | OUTPATIENT
Start: 2018-07-17 | End: 2018-07-23 | Stop reason: HOSPADM

## 2018-07-17 RX ORDER — SODIUM CHLORIDE AND POTASSIUM CHLORIDE 150; 900 MG/100ML; MG/100ML
INJECTION, SOLUTION INTRAVENOUS CONTINUOUS
Status: DISCONTINUED | OUTPATIENT
Start: 2018-07-17 | End: 2018-07-23 | Stop reason: HOSPADM

## 2018-07-17 RX ORDER — LABETALOL HYDROCHLORIDE 5 MG/ML
10 INJECTION, SOLUTION INTRAVENOUS
Status: DISCONTINUED | OUTPATIENT
Start: 2018-07-17 | End: 2018-07-23 | Stop reason: HOSPADM

## 2018-07-17 RX ORDER — HYDRALAZINE HYDROCHLORIDE 20 MG/ML
10 INJECTION INTRAMUSCULAR; INTRAVENOUS
Status: DISCONTINUED | OUTPATIENT
Start: 2018-07-17 | End: 2018-07-17

## 2018-07-17 RX ORDER — LIDOCAINE HYDROCHLORIDE 10 MG/ML
INJECTION, SOLUTION INFILTRATION; PERINEURAL
Status: COMPLETED
Start: 2018-07-17 | End: 2018-07-17

## 2018-07-17 RX ORDER — PROMETHAZINE HYDROCHLORIDE 25 MG/1
12.5-25 TABLET ORAL EVERY 4 HOURS PRN
Status: DISCONTINUED | OUTPATIENT
Start: 2018-07-17 | End: 2018-07-23 | Stop reason: HOSPADM

## 2018-07-17 RX ORDER — HYDROCHLOROTHIAZIDE 25 MG/1
25 TABLET ORAL DAILY
Status: DISCONTINUED | OUTPATIENT
Start: 2018-07-17 | End: 2018-07-23 | Stop reason: HOSPADM

## 2018-07-17 RX ORDER — ONDANSETRON 4 MG/1
4 TABLET, ORALLY DISINTEGRATING ORAL EVERY 4 HOURS PRN
Status: DISCONTINUED | OUTPATIENT
Start: 2018-07-17 | End: 2018-07-23 | Stop reason: HOSPADM

## 2018-07-17 RX ORDER — SODIUM CHLORIDE, SODIUM LACTATE, POTASSIUM CHLORIDE, CALCIUM CHLORIDE 600; 310; 30; 20 MG/100ML; MG/100ML; MG/100ML; MG/100ML
INJECTION, SOLUTION INTRAVENOUS CONTINUOUS
Status: DISCONTINUED | OUTPATIENT
Start: 2018-07-17 | End: 2018-07-17

## 2018-07-17 RX ORDER — ONDANSETRON 2 MG/ML
4 INJECTION INTRAMUSCULAR; INTRAVENOUS EVERY 4 HOURS PRN
Status: DISCONTINUED | OUTPATIENT
Start: 2018-07-17 | End: 2018-07-23 | Stop reason: HOSPADM

## 2018-07-17 RX ORDER — POLYETHYLENE GLYCOL 3350 17 G/17G
1 POWDER, FOR SOLUTION ORAL 2 TIMES DAILY PRN
Status: DISCONTINUED | OUTPATIENT
Start: 2018-07-17 | End: 2018-07-23 | Stop reason: HOSPADM

## 2018-07-17 RX ORDER — ENEMA 19; 7 G/133ML; G/133ML
1 ENEMA RECTAL
Status: DISCONTINUED | OUTPATIENT
Start: 2018-07-17 | End: 2018-07-23 | Stop reason: HOSPADM

## 2018-07-17 RX ORDER — CEFAZOLIN SODIUM 2 G/100ML
2 INJECTION, SOLUTION INTRAVENOUS EVERY 8 HOURS
Status: COMPLETED | OUTPATIENT
Start: 2018-07-17 | End: 2018-07-18

## 2018-07-17 RX ORDER — DILTIAZEM HYDROCHLORIDE 240 MG/1
240 CAPSULE, COATED, EXTENDED RELEASE ORAL
Status: DISCONTINUED | OUTPATIENT
Start: 2018-07-17 | End: 2018-07-23 | Stop reason: HOSPADM

## 2018-07-17 RX ORDER — TIZANIDINE 4 MG/1
2 TABLET ORAL 3 TIMES DAILY PRN
Status: DISCONTINUED | OUTPATIENT
Start: 2018-07-17 | End: 2018-07-23 | Stop reason: HOSPADM

## 2018-07-17 RX ORDER — DILTIAZEM HYDROCHLORIDE 240 MG/1
240 CAPSULE, EXTENDED RELEASE ORAL DAILY
Status: DISCONTINUED | OUTPATIENT
Start: 2018-07-17 | End: 2018-07-17

## 2018-07-17 RX ORDER — BUPIVACAINE HYDROCHLORIDE AND EPINEPHRINE 5; 5 MG/ML; UG/ML
INJECTION, SOLUTION EPIDURAL; INTRACAUDAL; PERINEURAL
Status: DISCONTINUED | OUTPATIENT
Start: 2018-07-17 | End: 2018-07-17 | Stop reason: HOSPADM

## 2018-07-17 RX ORDER — DOCUSATE SODIUM 100 MG/1
100 CAPSULE, LIQUID FILLED ORAL 2 TIMES DAILY
Status: DISCONTINUED | OUTPATIENT
Start: 2018-07-17 | End: 2018-07-23 | Stop reason: HOSPADM

## 2018-07-17 RX ORDER — AMOXICILLIN 250 MG
1 CAPSULE ORAL
Status: DISCONTINUED | OUTPATIENT
Start: 2018-07-17 | End: 2018-07-23 | Stop reason: HOSPADM

## 2018-07-17 RX ORDER — CALCIUM CARBONATE 500 MG/1
500 TABLET, CHEWABLE ORAL 2 TIMES DAILY
Status: DISCONTINUED | OUTPATIENT
Start: 2018-07-17 | End: 2018-07-23 | Stop reason: HOSPADM

## 2018-07-17 RX ORDER — PREGABALIN 150 MG/1
300 CAPSULE ORAL DAILY
Status: DISCONTINUED | OUTPATIENT
Start: 2018-07-17 | End: 2018-07-23 | Stop reason: HOSPADM

## 2018-07-17 RX ORDER — DIPHENHYDRAMINE HYDROCHLORIDE 50 MG/ML
25 INJECTION INTRAMUSCULAR; INTRAVENOUS EVERY 6 HOURS PRN
Status: DISCONTINUED | OUTPATIENT
Start: 2018-07-17 | End: 2018-07-23 | Stop reason: HOSPADM

## 2018-07-17 RX ORDER — GABAPENTIN 300 MG/1
600 CAPSULE ORAL 3 TIMES DAILY
Status: DISCONTINUED | OUTPATIENT
Start: 2018-07-17 | End: 2018-07-23 | Stop reason: HOSPADM

## 2018-07-17 RX ORDER — POLYVINYL ALCOHOL 14 MG/ML
1 SOLUTION/ DROPS OPHTHALMIC 2 TIMES DAILY
Status: DISCONTINUED | OUTPATIENT
Start: 2018-07-17 | End: 2018-07-23 | Stop reason: HOSPADM

## 2018-07-17 RX ORDER — OXYCODONE HCL 5 MG/5 ML
SOLUTION, ORAL ORAL
Status: COMPLETED
Start: 2018-07-17 | End: 2018-07-17

## 2018-07-17 RX ORDER — LISINOPRIL 20 MG/1
20 TABLET ORAL DAILY
Status: DISCONTINUED | OUTPATIENT
Start: 2018-07-17 | End: 2018-07-23 | Stop reason: HOSPADM

## 2018-07-17 RX ORDER — CLONIDINE HYDROCHLORIDE 0.1 MG/1
0.1 TABLET ORAL EVERY 4 HOURS PRN
Status: DISCONTINUED | OUTPATIENT
Start: 2018-07-17 | End: 2018-07-23 | Stop reason: HOSPADM

## 2018-07-17 RX ORDER — PROMETHAZINE HYDROCHLORIDE 25 MG/1
12.5-25 SUPPOSITORY RECTAL EVERY 4 HOURS PRN
Status: DISCONTINUED | OUTPATIENT
Start: 2018-07-17 | End: 2018-07-23 | Stop reason: HOSPADM

## 2018-07-17 RX ORDER — DIPHENHYDRAMINE HCL 25 MG
25 TABLET ORAL EVERY 6 HOURS PRN
Status: DISCONTINUED | OUTPATIENT
Start: 2018-07-17 | End: 2018-07-23 | Stop reason: HOSPADM

## 2018-07-17 RX ORDER — BACITRACIN 50000 [IU]/1
INJECTION, POWDER, FOR SOLUTION INTRAMUSCULAR
Status: DISCONTINUED | OUTPATIENT
Start: 2018-07-17 | End: 2018-07-17 | Stop reason: HOSPADM

## 2018-07-17 RX ORDER — LIDOCAINE HYDROCHLORIDE 10 MG/ML
0.5 INJECTION, SOLUTION INFILTRATION; PERINEURAL
Status: DISCONTINUED | OUTPATIENT
Start: 2018-07-17 | End: 2018-07-17

## 2018-07-17 RX ORDER — AMOXICILLIN 250 MG
1 CAPSULE ORAL NIGHTLY
Status: DISCONTINUED | OUTPATIENT
Start: 2018-07-17 | End: 2018-07-23 | Stop reason: HOSPADM

## 2018-07-17 RX ADMIN — TIZANIDINE 2 MG: 4 TABLET ORAL at 17:38

## 2018-07-17 RX ADMIN — SODIUM CHLORIDE, SODIUM LACTATE, POTASSIUM CHLORIDE, CALCIUM CHLORIDE: 600; 310; 30; 20 INJECTION, SOLUTION INTRAVENOUS at 07:42

## 2018-07-17 RX ADMIN — Medication: at 11:15

## 2018-07-17 RX ADMIN — STANDARDIZED SENNA CONCENTRATE AND DOCUSATE SODIUM 1 TABLET: 8.6; 5 TABLET, FILM COATED ORAL at 17:19

## 2018-07-17 RX ADMIN — OXYCODONE HYDROCHLORIDE 10 MG: 5 SOLUTION ORAL at 10:45

## 2018-07-17 RX ADMIN — POTASSIUM CHLORIDE AND SODIUM CHLORIDE: 900; 150 INJECTION, SOLUTION INTRAVENOUS at 17:20

## 2018-07-17 RX ADMIN — FENTANYL CITRATE 50 MCG: 50 INJECTION, SOLUTION INTRAMUSCULAR; INTRAVENOUS at 11:05

## 2018-07-17 RX ADMIN — GABAPENTIN 600 MG: 300 CAPSULE ORAL at 17:19

## 2018-07-17 RX ADMIN — CEFAZOLIN SODIUM 2 G: 2 INJECTION, SOLUTION INTRAVENOUS at 19:55

## 2018-07-17 RX ADMIN — LIDOCAINE HYDROCHLORIDE 0.5 ML: 10 INJECTION, SOLUTION INFILTRATION; PERINEURAL at 07:42

## 2018-07-17 RX ADMIN — DOCUSATE SODIUM 100 MG: 100 CAPSULE, LIQUID FILLED ORAL at 17:19

## 2018-07-17 RX ADMIN — HYDROCHLOROTHIAZIDE 25 MG: 25 TABLET ORAL at 17:23

## 2018-07-17 RX ADMIN — ANTACID TABLETS 500 MG: 500 TABLET, CHEWABLE ORAL at 17:19

## 2018-07-17 RX ADMIN — FENTANYL CITRATE 50 MCG: 50 INJECTION, SOLUTION INTRAMUSCULAR; INTRAVENOUS at 11:15

## 2018-07-17 ASSESSMENT — PATIENT HEALTH QUESTIONNAIRE - PHQ9
9. THOUGHTS THAT YOU WOULD BE BETTER OFF DEAD, OR OF HURTING YOURSELF: NOT AT ALL
7. TROUBLE CONCENTRATING ON THINGS, SUCH AS READING THE NEWSPAPER OR WATCHING TELEVISION: NOT AT ALL
4. FEELING TIRED OR HAVING LITTLE ENERGY: NOT AT ALL
7. TROUBLE CONCENTRATING ON THINGS, SUCH AS READING THE NEWSPAPER OR WATCHING TELEVISION: NOT AT ALL
SUM OF ALL RESPONSES TO PHQ9 QUESTIONS 1 AND 2: 3
8. MOVING OR SPEAKING SO SLOWLY THAT OTHER PEOPLE COULD HAVE NOTICED. OR THE OPPOSITE, BEING SO FIGETY OR RESTLESS THAT YOU HAVE BEEN MOVING AROUND A LOT MORE THAN USUAL: NEARLY EVERY DAY
5. POOR APPETITE OR OVEREATING: SEVERAL DAYS
6. FEELING BAD ABOUT YOURSELF - OR THAT YOU ARE A FAILURE OR HAVE LET YOURSELF OR YOUR FAMILY DOWN: SEVERAL DAYS
9. THOUGHTS THAT YOU WOULD BE BETTER OFF DEAD, OR OF HURTING YOURSELF: NOT AT ALL
2. FEELING DOWN, DEPRESSED, IRRITABLE, OR HOPELESS: NOT AT ALL
SUM OF ALL RESPONSES TO PHQ9 QUESTIONS 1 AND 2: 3
5. POOR APPETITE OR OVEREATING: SEVERAL DAYS
1. LITTLE INTEREST OR PLEASURE IN DOING THINGS: NEARLY EVERY DAY
4. FEELING TIRED OR HAVING LITTLE ENERGY: NOT AT ALL
3. TROUBLE FALLING OR STAYING ASLEEP OR SLEEPING TOO MUCH: SEVERAL DAYS
3. TROUBLE FALLING OR STAYING ASLEEP OR SLEEPING TOO MUCH: SEVERAL DAYS
2. FEELING DOWN, DEPRESSED, IRRITABLE, OR HOPELESS: NOT AT ALL
SUM OF ALL RESPONSES TO PHQ QUESTIONS 1-9: 9
SUM OF ALL RESPONSES TO PHQ QUESTIONS 1-9: 9
1. LITTLE INTEREST OR PLEASURE IN DOING THINGS: NEARLY EVERY DAY
6. FEELING BAD ABOUT YOURSELF - OR THAT YOU ARE A FAILURE OR HAVE LET YOURSELF OR YOUR FAMILY DOWN: SEVERAL DAYS
8. MOVING OR SPEAKING SO SLOWLY THAT OTHER PEOPLE COULD HAVE NOTICED. OR THE OPPOSITE, BEING SO FIGETY OR RESTLESS THAT YOU HAVE BEEN MOVING AROUND A LOT MORE THAN USUAL: NEARLY EVERY DAY

## 2018-07-17 ASSESSMENT — COGNITIVE AND FUNCTIONAL STATUS - GENERAL
MOBILITY SCORE: 18
TURNING FROM BACK TO SIDE WHILE IN FLAT BAD: A LITTLE
STANDING UP FROM CHAIR USING ARMS: A LITTLE
SUGGESTED CMS G CODE MODIFIER MOBILITY: CK
DRESSING REGULAR LOWER BODY CLOTHING: A LOT
PERSONAL GROOMING: A LITTLE
TOILETING: A LITTLE
MOVING FROM LYING ON BACK TO SITTING ON SIDE OF FLAT BED: A LITTLE
WALKING IN HOSPITAL ROOM: A LITTLE
MOVING TO AND FROM BED TO CHAIR: A LITTLE
HELP NEEDED FOR BATHING: A LITTLE
DRESSING REGULAR UPPER BODY CLOTHING: A LITTLE
DAILY ACTIVITIY SCORE: 18
SUGGESTED CMS G CODE MODIFIER DAILY ACTIVITY: CK
CLIMB 3 TO 5 STEPS WITH RAILING: A LITTLE

## 2018-07-17 ASSESSMENT — LIFESTYLE VARIABLES
ALCOHOL_USE: NO
EVER_SMOKED: YES

## 2018-07-17 ASSESSMENT — PAIN SCALES - GENERAL
PAINLEVEL_OUTOF10: 4
PAINLEVEL_OUTOF10: 0
PAINLEVEL_OUTOF10: 5
PAINLEVEL_OUTOF10: 4
PAINLEVEL_OUTOF10: 2
PAINLEVEL_OUTOF10: 0
PAINLEVEL_OUTOF10: 6
PAINLEVEL_OUTOF10: 5

## 2018-07-17 NOTE — OR SURGEON
Immediate Post OP Note    PreOp Diagnosis: lumbar stenosis    PostOp Diagnosis: same    Procedure(s):  LUMBAR FUSION POSTERIOR- IN SITU POSS - Wound Class: Clean with Drain  LUMBAR LAMINECTOMY DISKECTOMY L2-5 - Wound Class: Clean with Drain    Surgeon(s):  Robel Perez M.D.    Anesthesiologist/Type of Anesthesia:  Anesthesiologist: Doni Martin M.D./General    Surgical Staff:  Assistant: JOVANA Bello  Circulator: Lupe Lott R.N.  Relief Circulator: Taisha Chang R.N.  Scrub Person: Loreta Vines  Radiology Technologist: Tracy Vo    Specimens removed if any:  * No specimens in log *    Estimated Blood Loss: 100cc    Findings: good decompression    Complications: none        7/17/2018 10:26 AM OJVANA Bello

## 2018-07-17 NOTE — PROGRESS NOTES
Pre op assessment complete, pt's VSS, pt and family updated on POC. Call light within reach, pt denies any other needs at this time.     2nd COD sent to lab

## 2018-07-17 NOTE — CARE PLAN
Problem: Communication  Goal: The ability to communicate needs accurately and effectively will improve  Outcome: PROGRESSING AS EXPECTED      Problem: Safety  Goal: Will remain free from injury  Outcome: PROGRESSING AS EXPECTED      Problem: Venous Thromboembolism (VTW)/Deep Vein Thrombosis (DVT) Prevention:  Goal: Patient will participate in Venous Thrombosis (VTE)/Deep Vein Thrombosis (DVT)Prevention Measures  Outcome: PROGRESSING AS EXPECTED

## 2018-07-17 NOTE — OR NURSING
VSS.  Oximetry WNL on 1 liter nasal cannula.  Lumbar dsg CDI with minimal output noted to hemovac.  Neuro status at baseline prior to surgery per patient.  Awaiting room readiness.

## 2018-07-17 NOTE — PROGRESS NOTES
SHIFT NOTE    Plan of Care: Patient educated on current plan of care including mobilization, PCA, IVF, medications, room orientation. All questions answered and patient notified that staff is available for any further questions.    Communication: This patient has been able to make needs known, and has been cooperative with the current plan of care.    Safety: Patient has been appropriate and compliant with call light to call for assistance. Interventions including no slip socks, bed in low position and locked, belongings within reach and hourly rounding implemented this shift. Bed alarm on and operational for additional safety.    Pain Control: Pain has been well controlled with current interventions, including PCA and repositioning. Patient has been able to participate in care and rest comfortably following interventions.     Skin: Skin check completed. See flow sheet documentation for complete assessment. Interventions including frequent repositioning have been implemented to prevent breakdown.    VTE: Patient has been compliant with ordered interventions, including SCDs and YENI hose. Patient educated on rational for interventions.    Significant Events: No significant events to report this shift. Pt has been comfortable and stable.

## 2018-07-18 LAB
ANION GAP SERPL CALC-SCNC: 7 MMOL/L (ref 0–11.9)
BUN SERPL-MCNC: 14 MG/DL (ref 8–22)
CALCIUM SERPL-MCNC: 8.9 MG/DL (ref 8.5–10.5)
CHLORIDE SERPL-SCNC: 102 MMOL/L (ref 96–112)
CO2 SERPL-SCNC: 28 MMOL/L (ref 20–33)
CREAT SERPL-MCNC: 0.67 MG/DL (ref 0.5–1.4)
ERYTHROCYTE [DISTWIDTH] IN BLOOD BY AUTOMATED COUNT: 41.2 FL (ref 35.9–50)
GLUCOSE SERPL-MCNC: 127 MG/DL (ref 65–99)
HCT VFR BLD AUTO: 35.9 % (ref 37–47)
HGB BLD-MCNC: 12.3 G/DL (ref 12–16)
MCH RBC QN AUTO: 30.4 PG (ref 27–33)
MCHC RBC AUTO-ENTMCNC: 34.3 G/DL (ref 33.6–35)
MCV RBC AUTO: 88.6 FL (ref 81.4–97.8)
PLATELET # BLD AUTO: 230 K/UL (ref 164–446)
PMV BLD AUTO: 10.8 FL (ref 9–12.9)
POTASSIUM SERPL-SCNC: 3.8 MMOL/L (ref 3.6–5.5)
RBC # BLD AUTO: 4.05 M/UL (ref 4.2–5.4)
SODIUM SERPL-SCNC: 137 MMOL/L (ref 135–145)
WBC # BLD AUTO: 11.7 K/UL (ref 4.8–10.8)

## 2018-07-18 PROCEDURE — 80048 BASIC METABOLIC PNL TOTAL CA: CPT

## 2018-07-18 PROCEDURE — A9270 NON-COVERED ITEM OR SERVICE: HCPCS | Performed by: CLINICAL NURSE SPECIALIST

## 2018-07-18 PROCEDURE — 36415 COLL VENOUS BLD VENIPUNCTURE: CPT

## 2018-07-18 PROCEDURE — 97535 SELF CARE MNGMENT TRAINING: CPT

## 2018-07-18 PROCEDURE — G8987 SELF CARE CURRENT STATUS: HCPCS | Mod: CK

## 2018-07-18 PROCEDURE — G8988 SELF CARE GOAL STATUS: HCPCS | Mod: CI

## 2018-07-18 PROCEDURE — 700102 HCHG RX REV CODE 250 W/ 637 OVERRIDE(OP): Performed by: CLINICAL NURSE SPECIALIST

## 2018-07-18 PROCEDURE — 85027 COMPLETE CBC AUTOMATED: CPT

## 2018-07-18 PROCEDURE — G0378 HOSPITAL OBSERVATION PER HR: HCPCS

## 2018-07-18 PROCEDURE — 97166 OT EVAL MOD COMPLEX 45 MIN: CPT

## 2018-07-18 PROCEDURE — 700111 HCHG RX REV CODE 636 W/ 250 OVERRIDE (IP): Performed by: CLINICAL NURSE SPECIALIST

## 2018-07-18 PROCEDURE — 96375 TX/PRO/DX INJ NEW DRUG ADDON: CPT

## 2018-07-18 PROCEDURE — G8978 MOBILITY CURRENT STATUS: HCPCS | Mod: CK

## 2018-07-18 PROCEDURE — 97162 PT EVAL MOD COMPLEX 30 MIN: CPT

## 2018-07-18 PROCEDURE — 700112 HCHG RX REV CODE 229: Performed by: CLINICAL NURSE SPECIALIST

## 2018-07-18 PROCEDURE — G8979 MOBILITY GOAL STATUS: HCPCS | Mod: CI

## 2018-07-18 RX ORDER — OXYCODONE HYDROCHLORIDE 10 MG/1
10-20 TABLET ORAL EVERY 4 HOURS PRN
Status: DISCONTINUED | OUTPATIENT
Start: 2018-07-18 | End: 2018-07-23 | Stop reason: HOSPADM

## 2018-07-18 RX ADMIN — ANTACID TABLETS 500 MG: 500 TABLET, CHEWABLE ORAL at 04:55

## 2018-07-18 RX ADMIN — LISINOPRIL 20 MG: 20 TABLET ORAL at 04:53

## 2018-07-18 RX ADMIN — PREGABALIN 300 MG: 150 CAPSULE ORAL at 05:59

## 2018-07-18 RX ADMIN — OXYCODONE HYDROCHLORIDE 20 MG: 10 TABLET ORAL at 20:41

## 2018-07-18 RX ADMIN — GABAPENTIN 600 MG: 300 CAPSULE ORAL at 18:19

## 2018-07-18 RX ADMIN — GABAPENTIN 600 MG: 300 CAPSULE ORAL at 04:55

## 2018-07-18 RX ADMIN — CHOLECALCIFEROL TAB 25 MCG (1000 UNIT) 2000 UNITS: 25 TAB at 04:55

## 2018-07-18 RX ADMIN — DILTIAZEM HYDROCHLORIDE 240 MG: 240 CAPSULE, EXTENDED RELEASE ORAL at 04:56

## 2018-07-18 RX ADMIN — GABAPENTIN 600 MG: 300 CAPSULE ORAL at 11:47

## 2018-07-18 RX ADMIN — DOCUSATE SODIUM 100 MG: 100 CAPSULE, LIQUID FILLED ORAL at 15:32

## 2018-07-18 RX ADMIN — ANTACID TABLETS 500 MG: 500 TABLET, CHEWABLE ORAL at 15:33

## 2018-07-18 RX ADMIN — OXYCODONE HYDROCHLORIDE 20 MG: 10 TABLET ORAL at 11:47

## 2018-07-18 RX ADMIN — POLYVINYL ALCOHOL 1 DROP: 14 SOLUTION/ DROPS OPHTHALMIC at 20:42

## 2018-07-18 RX ADMIN — DOCUSATE SODIUM 100 MG: 100 CAPSULE, LIQUID FILLED ORAL at 04:55

## 2018-07-18 RX ADMIN — POLYETHYLENE GLYCOL 3350 1 PACKET: 17 POWDER, FOR SOLUTION ORAL at 20:41

## 2018-07-18 RX ADMIN — TIZANIDINE 2 MG: 4 TABLET ORAL at 11:47

## 2018-07-18 RX ADMIN — HYDROCHLOROTHIAZIDE 25 MG: 25 TABLET ORAL at 04:53

## 2018-07-18 RX ADMIN — CEFAZOLIN SODIUM 2 G: 2 INJECTION, SOLUTION INTRAVENOUS at 00:00

## 2018-07-18 RX ADMIN — STANDARDIZED SENNA CONCENTRATE AND DOCUSATE SODIUM 1 TABLET: 8.6; 5 TABLET, FILM COATED ORAL at 20:41

## 2018-07-18 RX ADMIN — OXYCODONE HYDROCHLORIDE 20 MG: 10 TABLET ORAL at 15:32

## 2018-07-18 ASSESSMENT — GAIT ASSESSMENTS
DISTANCE (FEET): 40
GAIT LEVEL OF ASSIST: MINIMAL ASSIST
ASSISTIVE DEVICE: 4 WHEEL WALKER

## 2018-07-18 ASSESSMENT — COGNITIVE AND FUNCTIONAL STATUS - GENERAL
STANDING UP FROM CHAIR USING ARMS: A LITTLE
MOVING TO AND FROM BED TO CHAIR: UNABLE
DRESSING REGULAR LOWER BODY CLOTHING: A LOT
DRESSING REGULAR UPPER BODY CLOTHING: A LITTLE
MOBILITY SCORE: 10
WALKING IN HOSPITAL ROOM: A LITTLE
SUGGESTED CMS G CODE MODIFIER DAILY ACTIVITY: CK
TURNING FROM BACK TO SIDE WHILE IN FLAT BAD: UNABLE
SUGGESTED CMS G CODE MODIFIER MOBILITY: CL
MOVING FROM LYING ON BACK TO SITTING ON SIDE OF FLAT BED: UNABLE
DAILY ACTIVITIY SCORE: 18
HELP NEEDED FOR BATHING: A LOT
TOILETING: A LITTLE
CLIMB 3 TO 5 STEPS WITH RAILING: TOTAL

## 2018-07-18 ASSESSMENT — PAIN SCALES - GENERAL
PAINLEVEL_OUTOF10: 6
PAINLEVEL_OUTOF10: 6
PAINLEVEL_OUTOF10: 2
PAINLEVEL_OUTOF10: 7
PAINLEVEL_OUTOF10: 3
PAINLEVEL_OUTOF10: 2

## 2018-07-18 ASSESSMENT — ACTIVITIES OF DAILY LIVING (ADL): TOILETING: INDEPENDENT

## 2018-07-18 NOTE — THERAPY
"Physical Therapy Evaluation completed.   Bed Mobility:  Supine to Sit:  (up in bedside chair)  Transfers: Sit to Stand: Contact Guard Assist  Gait: Level Of Assist: Minimal Assist with Front-Wheel Walker       Plan of Care: Will benefit from Physical Therapy 4 times per week  Discharge Recommendations: Equipment: Will Continue to Assess for Equipment Needs. Post-acute therapy Discharge to a transitional care facility for continued skilled therapy services/ SNF vs Rehab    See \"Rehab Therapy-Acute\" Patient Summary Report for complete documentation.     "

## 2018-07-18 NOTE — PROGRESS NOTES
PROVIDER CONTACTED: SHERIE Roberts  CALL TIME: 1553  REASON FOR CALL: Low SBP 87  CALLBACK TIME: 1553  RESULTS: orders received

## 2018-07-18 NOTE — OP REPORT
DATE OF SERVICE:  07/17/2018    PREOPERATIVE DIAGNOSES:  Critical lumbar central canal stenosis at lumbar 2   through 5 with severe motor and sensory radiculopathy recalcitrant to   nonoperative measures.    POSTOPERATIVE DIAGNOSES:  Critical lumbar central canal stenosis at lumbar 2   through 5 with severe motor and sensory radiculopathy recalcitrant to   nonoperative measures.    PROCEDURES:  1.  Lumbar 2 laminectomy with decompression of lumbar 2 roots.  2.  Lumbar 3 laminectomy with decompression of lumbar 3 roots.  3.  Lumbar 4 laminectomy with decompression of lumbar 4 roots.  4.  Lumbar 5 laminectomy with decompression of lumbar 5 roots.  5.  Posterolateral arthrodesis, lumbar 2, 3, 4, and 5.  6.  Use of locally harvested morselized autograft.  7.  Use of allograft.    SURGEON:  Robel Perez MD    ASSISTANT:  SHERIE Jeronimo    ANESTHESIA:  General.    COMPLICATIONS:  None.    ESTIMATED BLOOD LOSS:  200 mL.    DESCRIPTION OF PROCEDURE:  Patient was brought to the operating room,   identified in the usual fashion.  General endotracheal anesthesia was induced   by the anesthesia team.  Patient was then placed prone on the Reynaldo table   with bolsters.  All pressure points were meticulously padded.  Midline lumbar   incision was marked in the skin using fluoroscopic guidance.  She was then   prepped and draped in the usual sterile fashion.  Local anesthesia was   infiltrated in the subcutaneous tissue.  A 10 blade was used to incise the   skin.  Dissection was carried down in the subperiosteal fashion bilaterally.    Retractors were put in place.  Great care was taken not to disrupt the joint   capsule at any level at this point, we exposed the spinous process, laminae   bilaterally and transverse processes at lumbar 2, 3, 4, and 5.  Kocher was   placed on the transverse process of what we believed to be lumbar 4.  Film was   taken confirming that we were at the correct level.  This was then  marked   with a marking pen.  We then adjusted the retractors and performed a standard   laminectomy of lumbar 2, 3, 4, 5 using a combination of Leksell rongeur,   high-speed air drill, Kerrison 2, 3, and 4 punches.  This was quite difficult   given the amount of severe stenosis and scoliosis as well, but we were able to   completely free up the central canal, lateral recesses and bilateral neural   foramina.  Once we had done this, we then checked with a double ball probe to   confirm we had excellent decompression.  FloSeal with gentle tamponade was   used for hemostasis.  We then decorticated the transverse processes of lumbar   2, 3, and 45 bilaterally using a high-speed air drill.  We packed locally   harvested morselized autograft and allograft into the gutters bilaterally.  We   left a Hemovac drain in the epidural space, brought out through a separate   stab incision.  We then closed the incision in layers and topped with staples,   Xeroform, Telfa, and Medipore tape.  All sponge and needle counts were   correct x2 at the end the case.  I was present and scrubbed for the entire   procedure.  Patient awakened and was transferred to the recovery room in   stable condition.       ____________________________________     JESSICA CASPER MD    CPD / NTS    DD:  07/18/2018 11:50:30  DT:  07/18/2018 12:01:54    D#:  [id number]  Job#:  [id number]

## 2018-07-18 NOTE — THERAPY
"Occupational Therapy Evaluation completed.   Functional Status:    Sup>sit: Mod A  Sit><stand: CGA  Tolet xfer: Min A  LB dressing: Mod A    Plan of Care: Will benefit from Occupational Therapy 4 times per week  Discharge Recommendations:  Equipment: Will Continue to Assess for Equipment Needs. Post-acute therapy Discharge to a transitional care facility for continued skilled therapy services.    See \"Rehab Therapy-Acute\" Patient Summary Report for complete documentation.    52 y/o female s/p L2-5 laminectomy and fusion. Pt reports prior burning sensation in BLE extremities, stating that it has improved but also has etta foot numbness and limited ankle ROM - this has lead to some falls at home. Pt participated in functional mobility and ADL, requiring Min-mod A for bed and toilet transfers.  Pt ambulating with her own 4WW. Pt attempted to doff sock, but was unable to bend knees/reach feet. Pt lives with her ex- who has some health issues and is only able to help a limited amount. Due to her limited activity tolerance, diminished functional mobility, history of falls, and lack of help at home, it is recommended that pt go to rehab prior to returning home; this was discussed with patient and she is in agreement with this decision. Acute OT will follow pt while in house.   "

## 2018-07-18 NOTE — PROGRESS NOTES
Neurosurgery Progress Note    Subjective:  Doing well, pain controlled, thinks legs are a little better, enriquez, pca, eating, no oob yet    Exam:  bilat IP/quad 5/5, bilat PF 4+/5, bilat EHL 3/5, right DF 3/5, left DF 1-2-- overall improved from pre-op, hvac 180 x8    BP  Min: 98/63  Max: 127/76  Pulse  Av.3  Min: 63  Max: 87  Resp  Avg: 15.9  Min: 10  Max: 21  Temp  Av.9 °C (98.4 °F)  Min: 36.4 °C (97.5 °F)  Max: 37.7 °C (99.9 °F)  SpO2  Av.8 %  Min: 93 %  Max: 96 %    No Data Recorded    Recent Labs      18   WBC  11.7*   RBC  4.05*   HEMOGLOBIN  12.3   HEMATOCRIT  35.9*   MCV  88.6   MCH  30.4   MCHC  34.3   RDW  41.2   PLATELETCT  230   MPV  10.8     Recent Labs      18   SODIUM  137   POTASSIUM  3.8   CHLORIDE  102   CO2  28   GLUCOSE  127*   BUN  14   CREATININE  0.67   CALCIUM  8.9               Intake/Output       18 - 18 0659 18 - 18 0659      1900-0659 Total 1900-0659 Total       Intake    P.O.  480  360 840  --  -- --    P.O. 480 360 840 -- -- --    I.V.  2000  51.5  -- 51.5    Crystalloid Intake 2000 -- -- --    PCA End of Shift Total Volume (ml) -- -- -- 51.5 -- 51.5    Total Intake 2480 360 2840 51.5 -- 51.5       Output    Urine  375  2300 2675  --  -- --    Output (mL) (Urinary Catheter Indwelling Catheter 16f) 375 2300 2675 -- -- --    Drains  50  360 410  --  -- --    Output (ml) (Surgical Drain Group Back Hemovac 1 (A)) 50 360 410 -- -- --    Blood  250  -- 250  --  -- --    Est. Blood Loss (mL) 250 -- 250 -- -- --    Total Output 675 2660 3335 -- -- --       Net I/O     1805 -2300 -495 51.5 -- 51.5            Intake/Output Summary (Last 24 hours) at 18 0831  Last data filed at 18 0720   Gross per 24 hour   Intake           2891.5 ml   Output             3335 ml   Net           -443.5 ml            • vitamin D  2,000 Units DAILY   • gabapentin  600 mg TID   •  hydroCHLOROthiazide  25 mg DAILY   • lisinopril  20 mg DAILY   • pregabalin  300 mg DAILY   • Pharmacy Consult Request  1 Each PRN   • MD ALERT...Do not administer NSAIDS or ASPIRIN unless ORDERED By Neurosurgery  1 Each PRN   • docusate sodium  100 mg BID   • senna-docusate  1 Tab Nightly   • senna-docusate  1 Tab Q24HRS PRN   • polyethylene glycol/lytes  1 Packet BID PRN   • magnesium hydroxide  30 mL QDAY PRN   • bisacodyl  10 mg Q24HRS PRN   • fleet  1 Each Once PRN   • 0.9 % NaCl with KCl 20 mEq 1,000 mL   Continuous   • HYDROmorphone   Continuous   • diphenhydrAMINE  25 mg Q6HRS PRN    Or   • diphenhydrAMINE  25 mg Q6HRS PRN   • ondansetron  4 mg Q4HRS PRN   • ondansetron  4 mg Q4HRS PRN   • promethazine  12.5-25 mg Q4HRS PRN   • promethazine  12.5-25 mg Q4HRS PRN   • prochlorperazine  5-10 mg Q4HRS PRN   • tizanidine  2 mg TID PRN   • labetalol  10 mg Q HOUR PRN   • cloNIDine  0.1 mg Q4HRS PRN   • benzocaine-menthol  1 Lozenge Q2HRS PRN   • calcium carbonate  500 mg BID   • DILTIAZem CD  240 mg Q DAY   • artificial tears  1 Drop BID       Assessment and Plan:  Hospital day #1  POD #1 L2-5 lami  Prophylactic anticoagulation: no         Start date/time: after drain removed    Plan:  Pt/ot/mobilize  Urinary incontinence pre-op-- enriquez in place, d/c once more mobile  Cont hvac  Start prophy lovenox when drain out  D/c pca and begin po pain meds

## 2018-07-19 LAB
ANION GAP SERPL CALC-SCNC: 8 MMOL/L (ref 0–11.9)
BUN SERPL-MCNC: 26 MG/DL (ref 8–22)
CALCIUM SERPL-MCNC: 8.5 MG/DL (ref 8.5–10.5)
CHLORIDE SERPL-SCNC: 96 MMOL/L (ref 96–112)
CO2 SERPL-SCNC: 27 MMOL/L (ref 20–33)
CREAT SERPL-MCNC: 1.39 MG/DL (ref 0.5–1.4)
ERYTHROCYTE [DISTWIDTH] IN BLOOD BY AUTOMATED COUNT: 43.2 FL (ref 35.9–50)
GLUCOSE SERPL-MCNC: 113 MG/DL (ref 65–99)
HCT VFR BLD AUTO: 32.2 % (ref 37–47)
HGB BLD-MCNC: 10.9 G/DL (ref 12–16)
MCH RBC QN AUTO: 30.5 PG (ref 27–33)
MCHC RBC AUTO-ENTMCNC: 33.9 G/DL (ref 33.6–35)
MCV RBC AUTO: 90.2 FL (ref 81.4–97.8)
PLATELET # BLD AUTO: 183 K/UL (ref 164–446)
PMV BLD AUTO: 11.3 FL (ref 9–12.9)
POTASSIUM SERPL-SCNC: 3.9 MMOL/L (ref 3.6–5.5)
RBC # BLD AUTO: 3.57 M/UL (ref 4.2–5.4)
SODIUM SERPL-SCNC: 131 MMOL/L (ref 135–145)
WBC # BLD AUTO: 11.5 K/UL (ref 4.8–10.8)

## 2018-07-19 PROCEDURE — 97116 GAIT TRAINING THERAPY: CPT

## 2018-07-19 PROCEDURE — 700112 HCHG RX REV CODE 229: Performed by: CLINICAL NURSE SPECIALIST

## 2018-07-19 PROCEDURE — A9270 NON-COVERED ITEM OR SERVICE: HCPCS | Performed by: CLINICAL NURSE SPECIALIST

## 2018-07-19 PROCEDURE — 85027 COMPLETE CBC AUTOMATED: CPT

## 2018-07-19 PROCEDURE — 36415 COLL VENOUS BLD VENIPUNCTURE: CPT

## 2018-07-19 PROCEDURE — 700105 HCHG RX REV CODE 258

## 2018-07-19 PROCEDURE — 700102 HCHG RX REV CODE 250 W/ 637 OVERRIDE(OP): Performed by: CLINICAL NURSE SPECIALIST

## 2018-07-19 PROCEDURE — G0378 HOSPITAL OBSERVATION PER HR: HCPCS

## 2018-07-19 PROCEDURE — 97530 THERAPEUTIC ACTIVITIES: CPT

## 2018-07-19 PROCEDURE — 80048 BASIC METABOLIC PNL TOTAL CA: CPT

## 2018-07-19 RX ORDER — SODIUM CHLORIDE 9 MG/ML
INJECTION, SOLUTION INTRAVENOUS
Status: COMPLETED
Start: 2018-07-19 | End: 2018-07-19

## 2018-07-19 RX ADMIN — OXYCODONE HYDROCHLORIDE 20 MG: 10 TABLET ORAL at 05:30

## 2018-07-19 RX ADMIN — HYDROCHLOROTHIAZIDE 25 MG: 25 TABLET ORAL at 05:30

## 2018-07-19 RX ADMIN — GABAPENTIN 600 MG: 300 CAPSULE ORAL at 12:04

## 2018-07-19 RX ADMIN — ANTACID TABLETS 500 MG: 500 TABLET, CHEWABLE ORAL at 18:25

## 2018-07-19 RX ADMIN — LISINOPRIL 20 MG: 20 TABLET ORAL at 05:31

## 2018-07-19 RX ADMIN — CHOLECALCIFEROL TAB 25 MCG (1000 UNIT) 2000 UNITS: 25 TAB at 05:30

## 2018-07-19 RX ADMIN — DOCUSATE SODIUM 100 MG: 100 CAPSULE, LIQUID FILLED ORAL at 05:30

## 2018-07-19 RX ADMIN — GABAPENTIN 600 MG: 300 CAPSULE ORAL at 05:31

## 2018-07-19 RX ADMIN — DILTIAZEM HYDROCHLORIDE 240 MG: 240 CAPSULE, EXTENDED RELEASE ORAL at 05:31

## 2018-07-19 RX ADMIN — POLYVINYL ALCOHOL 1 DROP: 14 SOLUTION/ DROPS OPHTHALMIC at 05:33

## 2018-07-19 RX ADMIN — OXYCODONE HYDROCHLORIDE 20 MG: 10 TABLET ORAL at 01:10

## 2018-07-19 RX ADMIN — TIZANIDINE 2 MG: 4 TABLET ORAL at 18:25

## 2018-07-19 RX ADMIN — SODIUM CHLORIDE 500 ML: 9 INJECTION, SOLUTION INTRAVENOUS at 20:46

## 2018-07-19 RX ADMIN — GABAPENTIN 600 MG: 300 CAPSULE ORAL at 18:25

## 2018-07-19 RX ADMIN — MAGNESIUM HYDROXIDE 30 ML: 400 SUSPENSION ORAL at 10:35

## 2018-07-19 RX ADMIN — OXYCODONE HYDROCHLORIDE 20 MG: 10 TABLET ORAL at 15:43

## 2018-07-19 RX ADMIN — DOCUSATE SODIUM 100 MG: 100 CAPSULE, LIQUID FILLED ORAL at 18:25

## 2018-07-19 RX ADMIN — POLYVINYL ALCOHOL 1 DROP: 14 SOLUTION/ DROPS OPHTHALMIC at 18:00

## 2018-07-19 RX ADMIN — OXYCODONE HYDROCHLORIDE 20 MG: 10 TABLET ORAL at 10:35

## 2018-07-19 RX ADMIN — PREGABALIN 300 MG: 150 CAPSULE ORAL at 05:30

## 2018-07-19 RX ADMIN — ANTACID TABLETS 500 MG: 500 TABLET, CHEWABLE ORAL at 05:31

## 2018-07-19 ASSESSMENT — PAIN SCALES - GENERAL
PAINLEVEL_OUTOF10: 7
PAINLEVEL_OUTOF10: 2
PAINLEVEL_OUTOF10: 6
PAINLEVEL_OUTOF10: 7
PAINLEVEL_OUTOF10: 6
PAINLEVEL_OUTOF10: 8
PAINLEVEL_OUTOF10: 5
PAINLEVEL_OUTOF10: 2

## 2018-07-19 ASSESSMENT — GAIT ASSESSMENTS
ASSISTIVE DEVICE: 4 WHEEL WALKER
DISTANCE (FEET): 70
DEVIATION: STEP TO;ANTALGIC;BRADYKINETIC
GAIT LEVEL OF ASSIST: MINIMAL ASSIST

## 2018-07-19 NOTE — CONSULTS
"Physical Medicine and Rehabilitation Consultation    Date of Consultation: 7/19/2018  Consulting provider: Robel Perez MD  Reason for consultation: assess for acute inpatient rehab appropriateness  Chief complaint: leg weakness and numbness    HPI: The patient is a 53 y.o. female with a past medical history of hypertension, lumbar spinal stenosis, admitted on 7/17/2018  5:36 AM for an elective lumbar surgery.    Per review of the medical record and confirmation with the patient, she had critical  Lumbar stenosis with severe L2-5 sensory and motor radiculopathy for which she was admitted for a L2-5 decompression and posterolateral arthrodesis with Dr. Perez on 7/18. She has used 90 OME in last 12 hours.     The patient currently reports she was followed for many years by Dr. Mendoza for pain. She started having pain and numbness in her right leg this February, had a \"back injection\" that helped, then had symptoms on the left leg in March, had an epidural which did not help and made her pain worse. After the epidural she then had weakness in her ankles bilaterally. She then developed worsened pain, as well as saddle anesthesia, for months prior to her getting a referral to neurosurgery. She even saw a podiatrist for the numbness in her feet, and she got an ankle brace. She states she had bladder incontinence prior to her surgery because she couldn't feel when her bladder was full. She still reports numbness in her legs/feet/saddle area, but there is no more burning pain. Still has severe weakness at her ankles. She hasn't moved her bowels since surgery. 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    ROS:  A comprehensive review of systems was completed and is negative unless otherwise noted in the above HPI.    Social Hx:  Pre-morbidly, this patient lived in a single level home with no steps to enter, with her ex-.  Denies " "tobacco, alcohol, uses marijuana occasionally.    Prior level of function:   Independent,     Current level of function:  The patient was evaluated by acute care Physical Therapy and Occupational Therapy; currently requiring minimal to moderate assistance for mobility and minimal to moderate assistance for ADLs.    6 clicks score 10 mobility, 18 ADLs      PMH:  Past Medical History:   Diagnosis Date   • Anesthesia     \"woke up during surgery\"   • Arthritis    • Bowel habit changes     constipation   • Dental disorder     partial   • High cholesterol    • Hypertension    • Pain     both knees, right shoulder, hip area   • Renal disorder 2005    kidney infection   • Unspecified hemorrhagic conditions 2005    \"pulled IV from groin, had to hold pressure to stop bleeding and stayed additional day in the hospital\"   • Urinary incontinence        PSH:  Past Surgical History:   Procedure Laterality Date   • LUMBAR FUSION POSTERIOR  7/17/2018    Procedure: LUMBAR FUSION POSTERIOR- IN SITU POSS;  Surgeon: Robel Perez M.D.;  Location: [facility];  Service: Neurosurgery   • LUMBAR LAMINECTOMY DISKECTOMY  7/17/2018    Procedure: LUMBAR LAMINECTOMY DISKECTOMY L2-5;  Surgeon: Robel Perez M.D.;  Location: [facility];  Service: Neurosurgery   • DEBRIDEMENT  12/5/2013    Performed by Doni Merida M.D. at [facility]   • HIP ARTHROSCOPY  12/5/2013    Performed by Doni Merida M.D. at [facility]   • ORTHOPEDIC OSTEOTOMY  12/5/2013    Performed by Doni Merida M.D. at [facility]   • INCISION AND DRAINAGE GENERAL  1993    from infected tooth   • OTHER ORTHOPEDIC SURGERY      Michael knee replacement       FHX:  Family History   Problem Relation Age of Onset   • Heart Disease     • Hypertension     • Cancer         Medications:  Current Facility-Administered Medications   Medication Dose   • oxyCODONE immediate release (ROXICODONE) tablet " "10-20 mg  10-20 mg   • vitamin D (cholecalciferol) tablet 2,000 Units  2,000 Units   • gabapentin (NEURONTIN) capsule 600 mg  600 mg   • hydroCHLOROthiazide (HYDRODIURIL) tablet 25 mg  25 mg   • lisinopril (PRINIVIL) 10 MG tablet 20 mg  20 mg   • pregabalin (LYRICA) capsule 300 mg  300 mg   • Pharmacy Consult Request ...Pain Management Review 1 Each  1 Each   • MD ALERT...Do not administer NSAIDS or ASPIRIN unless ORDERED By Neurosurgery 1 Each  1 Each   • docusate sodium (COLACE) capsule 100 mg  100 mg   • senna-docusate (PERICOLACE or SENOKOT S) 8.6-50 MG per tablet 1 Tab  1 Tab   • senna-docusate (PERICOLACE or SENOKOT S) 8.6-50 MG per tablet 1 Tab  1 Tab   • polyethylene glycol/lytes (MIRALAX) PACKET 1 Packet  1 Packet   • magnesium hydroxide (MILK OF MAGNESIA) suspension 30 mL  30 mL   • bisacodyl (DULCOLAX) suppository 10 mg  10 mg   • fleet enema 133 mL  1 Each   • 0.9 % NaCl with KCl 20 mEq infusion     • diphenhydrAMINE (BENADRYL) tablet/capsule 25 mg  25 mg    Or   • diphenhydrAMINE (BENADRYL) injection 25 mg  25 mg   • ondansetron (ZOFRAN) syringe/vial injection 4 mg  4 mg   • ondansetron (ZOFRAN ODT) dispertab 4 mg  4 mg   • promethazine (PHENERGAN) tablet 12.5-25 mg  12.5-25 mg   • promethazine (PHENERGAN) suppository 12.5-25 mg  12.5-25 mg   • prochlorperazine (COMPAZINE) injection 5-10 mg  5-10 mg   • tizanidine (ZANAFLEX) tablet 2 mg  2 mg   • labetalol (NORMODYNE,TRANDATE) injection 10 mg  10 mg   • cloNIDine (CATAPRES) tablet 0.1 mg  0.1 mg   • benzocaine-menthol (CEPACOL) lozenge 1 Lozenge  1 Lozenge   • calcium carbonate (TUMS) chewable tab 500 mg  500 mg   • DILTIAZem CD (CARDIZEM CD) capsule 240 mg  240 mg   • artificial tears 1.4 % ophthalmic solution 1 Drop  1 Drop       Allergies:  No Known Allergies    Physical Exam:  Vitals: Blood pressure 101/50, pulse 67, temperature 36.7 °C (98 °F), resp. rate 18, height 1.651 m (5' 5\"), weight 92.8 kg (204 lb 9.4 oz), last menstrual period 12/01/2013, " SpO2 93 %, not currently breastfeeding.  Gen: NAD, pleasant female  HEENT: NC/AT, moist mucous membranes  Cardio: RRR, no murmurs, 2+ peripheral edema in bilateral feet  Pulm: CTAB, with normal respiratory effort, on room air  Abd: Soft NTND, active bowel sounds, protuberant  Ext: No calf tenderness. No clubbing/cyanosis.    Neuro:  Mental status:  A&Ox4 (person, place, date, situation) answers questions appropriately follows commands      Motor:  Shoulder flexors:  Right -  5/5, Left -  5/5  Elbow flexors:  Right -  5/5, Left -  5/5  Elbow extensors:  Right -  5/5, Left -  5/5  Symmetrical   Hip flexors:  Right -  4/5, Left -  4/5  Knee ext:  Right -  4/5, Left -  4/5  Dorsiflexors:  Right -  1/5, Left -  0/5  EHL:  Right -  1/5, Left -  1/5  Plantar flexors:  Right -  4/5, Left -  4/5       Sensory:   Decreased L2-S1 dermatome      DTRs: 0 in bilateral patellar and achilles tendons  No clonus at bilateral ankles  Negative babinski b/l    Labs:  Recent Labs      07/18/18 0225 07/19/18   0136   RBC  4.05*  3.57*   HEMOGLOBIN  12.3  10.9*   HEMATOCRIT  35.9*  32.2*   PLATELETCT  230  183     Recent Labs      07/18/18 0225  07/19/18   0136   SODIUM  137  131*   POTASSIUM  3.8  3.9   CHLORIDE  102  96   CO2  28  27   GLUCOSE  127*  113*   BUN  14  26*   CREATININE  0.67  1.39   CALCIUM  8.9  8.5     Recent Results (from the past 24 hour(s))   Basic Metabolic Panel (BMP)    Collection Time: 07/19/18  1:36 AM   Result Value Ref Range    Sodium 131 (L) 135 - 145 mmol/L    Potassium 3.9 3.6 - 5.5 mmol/L    Chloride 96 96 - 112 mmol/L    Co2 27 20 - 33 mmol/L    Glucose 113 (H) 65 - 99 mg/dL    Bun 26 (H) 8 - 22 mg/dL    Creatinine 1.39 0.50 - 1.40 mg/dL    Calcium 8.5 8.5 - 10.5 mg/dL    Anion Gap 8.0 0.0 - 11.9   CBC without Differential    Collection Time: 07/19/18  1:36 AM   Result Value Ref Range    WBC 11.5 (H) 4.8 - 10.8 K/uL    RBC 3.57 (L) 4.20 - 5.40 M/uL    Hemoglobin 10.9 (L) 12.0 - 16.0 g/dL     Hematocrit 32.2 (L) 37.0 - 47.0 %    MCV 90.2 81.4 - 97.8 fL    MCH 30.5 27.0 - 33.0 pg    MCHC 33.9 33.6 - 35.0 g/dL    RDW 43.2 35.9 - 50.0 fL    Platelet Count 183 164 - 446 K/uL    MPV 11.3 9.0 - 12.9 fL   ESTIMATED GFR    Collection Time: 07/19/18  1:36 AM   Result Value Ref Range    GFR If  48 (A) >60 mL/min/1.73 m 2    GFR If Non  39 (A) >60 mL/min/1.73 m 2       RADIOLOGY  Images personally reviewed.    ASSESSMENT:    Patient is a 53 y.o. female admitted with critical lumbar stenosis causing severe motor and sensory radiculopathy, s/p L2-5 decompression and posterolateral arthrodesis with Dr. Perez on 7/18. Patient with bilateral foot drop, and abnormal sensation from L2-S3.     Patient with multiple co-morbidities(including but not limited to hypertension, urinary incontinence, neurogenic bladder, constipation); with functional deficits in mobility/self-care, and Severe de-conditioning.     Pre-morbidly, this patient lived in a single level home with no steps to enter, with her ex-. The patient was evaluated by acute care Physical Therapy and Occupational Therapy; currently requiring minimal to moderate assistance for mobility and minimal to moderate assistance for ADLs.    6 clicks score 10 mobility, 18 ADLs    The patient is a(n) Excellent candidate for an acute inpatient rehabilitation program with a coordinated program of care at an intensity and frequency not available at a lower level of care.     Note: if patient continues to progress while waiting for medical clearance, and no longer requires 2 of of 3 therapy services (PT/OT/SLP), patient will no longer need acute inpatient rehabilitation.    This recommendation is substantiated by the patient's current medical condition with intervention and assessment of medical issues requiring an acute level of care for patient's safety and maximum outcome. A coordinated program of care will be provided by an  interdisciplinary team including physical therapy, occupational therapy, physiatry, rehab nursing and rehab psychology. Rehab goals include improved mobility, self-care management, strength and conditioning/endurance, pain management, bowel and bladder management, mood and affect, and safety with independent home management including caregiver training.     Estimated length of stay is approximately 14-21 days. Rehab potential: Excellent. Disposition: to pre-morbid independent living setting with supportive care of patient's family. We will continue to follow with you in anticipation of discharge to acute inpatient rehabilitation when medically stable to do so at the discretion of her  attending physician. Thank you for allowing us to participate in her care. Please call with any questions regarding this recommendation.    Adelita Hunt M.D.  Physical Medicine and Rehabilitation

## 2018-07-19 NOTE — CARE PLAN
Problem: Safety  Goal: Will remain free from injury  Outcome: PROGRESSING AS EXPECTED  Pt's using call light. OT/PT consultation. Pt educated about bed alarm and need to call for assistance. Pt verbalized understanding.    Problem: Respiratory:  Goal: Respiratory status will improve  Outcome: PROGRESSING SLOWER THAN EXPECTED  Pt O2Sat drops bellow 90% on RA. Pt placed on 3L of O2. Educated about using IS, demonstrated using of IS reaching the goal of 1000. Lung sounds clear at all lobes, diminished in LLL.

## 2018-07-19 NOTE — THERAPY
"Physical Therapy Treatment completed.   Bed Mobility:  Supine to Sit: Contact Guard Assist (HOB flat and no rail)  Transfers: Sit to Stand: Contact Guard Assist (from EOB->FWW)  Gait: Level Of Assist: Minimal Assist with Front-Wheel Walker       Plan of Care: Will benefit from Physical Therapy 4 times per week  Discharge Recommendations: Equipment: No Equipment Needed. Post-acute therapy Discharge to a transitional care facility for continued skilled therapy services.     See \"Rehab Therapy-Acute\" Patient Summary Report for complete documentation.       "

## 2018-07-19 NOTE — PROGRESS NOTES
SHIFT NOTE    Plan of Care: Patient educated on current plan of care including mobility. All questions answered and patient notified that staff is available for any further questions.    Communication: This patient has been able to make needs known, and has been cooperative with the current plan of care.    Safety: Patient has been appropriate and compliant with call light to call for assistance. Interventions including no slip socks, bed in low position and locked, belongings within reach and hourly rounding implemented this shift. Bed alarm on and  for additional safety.    Pain Control: Pain has been well controlled with current interventions, including PO medications. Patient has been able to participate in care and rest comfortably following interventions.     Mobility: Patient able to participate with PT/OT: yes  Highest level of activity achieved: ambulate  Assistance: 1 person  Equipment required: walker  Distance ambulated: 50  Number of times: 4    Skin: Skin check completed. See flow sheet documentation for complete assessment. Interventions including frequent repositioning have been implemented to prevent breakdown.    VTE: Patient has been compliant with ordered interventions, including SCDs and YENI hose. Patient educated on rational for interventions.    Significant Events: Low SBP, see previous note. Resolved at this time. Continue to monitor. Otherwise no significant events to report this shift. Pt has been comfortable and stable.

## 2018-07-19 NOTE — PROGRESS NOTES
Pain rated 7/10, medicated per MAR, pt would like oxycodone q4hr throughout shift, RN verbalized understanding.   + bowel sounds, + flatus, miralax given to assist with BM.   Incision to back covered with dressing, some old drainage noted, Hvac still in place with serosanguinous ouput.   Numbness to BLE, 3/5 strength for plantarflexion, 2/5 strength dorsiflexion on RLE 1/5 on LLE. This is patient's baseline.  POC discussed, no further needs at this time, call light within reach, bed alarm armed.

## 2018-07-19 NOTE — CARE PLAN
Problem: Safety  Goal: Will remain free from falls    Intervention: Implement fall precautions  Bed alarm armed, call light within reach and used appropriately, FWW in bathroom out of reach of patient, treaded socks on over TEDs.       Problem: Venous Thromboembolism (VTW)/Deep Vein Thrombosis (DVT) Prevention:  Goal: Patient will participate in Venous Thrombosis (VTE)/Deep Vein Thrombosis (DVT)Prevention Measures    Intervention: Ensure patient wears graduated elastic stockings (YENI hose) and/or SCDs, if ordered, when in bed or chair (Remove at least once per shift for skin check)  Patient wearing TEDs and SCDs when not mobilizing.       Problem: Bowel/Gastric:  Goal: Will not experience complications related to bowel motility    Intervention: Implement interventions to promote bowel evacuation if inadequate bowel movements in past 48 hours  Scheduled stool softener provided per MAR, PRN miralax given for supplement, + bowel sounds and + flatus, patient mobilizing.       Problem: Pain Management  Goal: Pain level will decrease to patient's comfort goal    Intervention: Follow pain managment plan developed in collaboration with patient and Interdisciplinary Team  Administering PRN oxycodone per MAR, managing pain well, educated on PRN zanaflex available.

## 2018-07-19 NOTE — DISCHARGE PLANNING
TCC order from Dr. Perez    POD #2 L2-5 lami/posterolat fusion secondary to  Critical lumbar central canal stenosis at lumbar 2 through 5 with severe motor and sensory radiculopathy recalcitrant to nonoperative measures. Ongoing therapy need. Urinary incontinence pre-op pain management wound care. Medicaid observation status. Dr. Hunt to consult per protocol.

## 2018-07-19 NOTE — PROGRESS NOTES
Neurosurgery Progress Note     Subjective:  Doing well, pain fairly well-controlled    Exam:  bilat IP/quad 5/5, bilat PF 4+/5, bilat EHL 3/5, right DF 3/5, left DF 1-2-- overall improved from pre-op    BP  Min: 87/43  Max: 143/95  Pulse  Av.3  Min: 64  Max: 73  Resp  Av  Min: 16  Max: 16  Temp  Av.8 °C (98.3 °F)  Min: 36.3 °C (97.4 °F)  Max: 37.4 °C (99.4 °F)  SpO2  Av.8 %  Min: 90 %  Max: 97 %    No Data Recorded    Recent Labs      18   0136   WBC  11.7*  11.5*   RBC  4.05*  3.57*   HEMOGLOBIN  12.3  10.9*   HEMATOCRIT  35.9*  32.2*   MCV  88.6  90.2   MCH  30.4  30.5   MCHC  34.3  33.9   RDW  41.2  43.2   PLATELETCT  230  183   MPV  10.8  11.3     Recent Labs      18   0136   SODIUM  137  131*   POTASSIUM  3.8  3.9   CHLORIDE  102  96   CO2  28  27   GLUCOSE  127*  113*   BUN  14  26*   CREATININE  0.67  1.39   CALCIUM  8.9  8.5               Intake/Output       18 07 - 18 0659 18 - 18 0659       Total  Total       Intake    P.O.  --  360 360  --  -- --    P.O. -- 360 360 -- -- --    I.V.  74  -- 74  --  -- --    PCA End of Shift Total Volume (ml) 74 -- 74 -- -- --    Total Intake 74 360 434 -- -- --       Output    Urine  800  625 1425  --  -- --    Output (mL) (Urinary Catheter Indwelling Catheter 16f)  -- -- --    Drains  130  290 420  --  -- --    Output (ml) (Surgical Drain Group Back Hemovac 1 (A)) 130 290 420 -- -- --    Total Output  -- -- --       Net I/O     -856 -555 -1411 -- -- --            Intake/Output Summary (Last 24 hours) at 18 0834  Last data filed at 18 0600   Gross per 24 hour   Intake            382.5 ml   Output             1845 ml   Net          -1462.5 ml            • oxyCODONE immediate-release  10-20 mg Q4HRS PRN   • vitamin D  2,000 Units DAILY   • gabapentin  600 mg TID   • hydroCHLOROthiazide  25 mg DAILY   •  lisinopril  20 mg DAILY   • pregabalin  300 mg DAILY   • Pharmacy Consult Request  1 Each PRN   • MD ACHARYA...Do not administer NSAIDS or ASPIRIN unless ORDERED By Neurosurgery  1 Each PRN   • docusate sodium  100 mg BID   • senna-docusate  1 Tab Nightly   • senna-docusate  1 Tab Q24HRS PRN   • polyethylene glycol/lytes  1 Packet BID PRN   • magnesium hydroxide  30 mL QDAY PRN   • bisacodyl  10 mg Q24HRS PRN   • fleet  1 Each Once PRN   • 0.9 % NaCl with KCl 20 mEq 1,000 mL   Continuous   • diphenhydrAMINE  25 mg Q6HRS PRN    Or   • diphenhydrAMINE  25 mg Q6HRS PRN   • ondansetron  4 mg Q4HRS PRN   • ondansetron  4 mg Q4HRS PRN   • promethazine  12.5-25 mg Q4HRS PRN   • promethazine  12.5-25 mg Q4HRS PRN   • prochlorperazine  5-10 mg Q4HRS PRN   • tizanidine  2 mg TID PRN   • labetalol  10 mg Q HOUR PRN   • cloNIDine  0.1 mg Q4HRS PRN   • benzocaine-menthol  1 Lozenge Q2HRS PRN   • calcium carbonate  500 mg BID   • DILTIAZem CD  240 mg Q DAY   • artificial tears  1 Drop BID       Assessment and Plan:  POD #2 L2-5 lami/posterolat fusion  Prophylactic anticoagulation: no         Start date/time: after drain removed    Plan:  Pt/ot/mobilize  D/c enriquez  Cont hvac  Start prophy lovenox when drain out  po pain meds

## 2018-07-19 NOTE — DISCHARGE PLANNING
Physiatry Dr. Hunt recommending pt appropriate for IRF level care. Admission will require prior auth from Medicaid.

## 2018-07-19 NOTE — DISCHARGE PLANNING
Medicaid observation status. Would appreciate updated therapy notes -  PT/OT - to assist with Medicaid authorization for inpatient rehab. Will follow.

## 2018-07-19 NOTE — PROGRESS NOTES
Pt is eating lunch sitting in bed. Ambulated from bed to chair and to the restroom. Standby assist, four wheels walker. Pt states pain is 6/10.  Pt's O2Sat remains above 90% on 2L O2 via nasal cannula. Bed alarm is on, call light within reach.

## 2018-07-20 PROBLEM — M48.062 LUMBAR STENOSIS WITH NEUROGENIC CLAUDICATION: Status: ACTIVE | Noted: 2018-07-20

## 2018-07-20 LAB
ANION GAP SERPL CALC-SCNC: 6 MMOL/L (ref 0–11.9)
BASOPHILS # BLD AUTO: 0.4 % (ref 0–1.8)
BASOPHILS # BLD: 0.04 K/UL (ref 0–0.12)
BUN SERPL-MCNC: 37 MG/DL (ref 8–22)
CALCIUM SERPL-MCNC: 8.4 MG/DL (ref 8.5–10.5)
CHLORIDE SERPL-SCNC: 97 MMOL/L (ref 96–112)
CO2 SERPL-SCNC: 27 MMOL/L (ref 20–33)
CREAT SERPL-MCNC: 1.81 MG/DL (ref 0.5–1.4)
EOSINOPHIL # BLD AUTO: 0.28 K/UL (ref 0–0.51)
EOSINOPHIL NFR BLD: 2.6 % (ref 0–6.9)
ERYTHROCYTE [DISTWIDTH] IN BLOOD BY AUTOMATED COUNT: 44.1 FL (ref 35.9–50)
GLUCOSE SERPL-MCNC: 115 MG/DL (ref 65–99)
HCT VFR BLD AUTO: 30.9 % (ref 37–47)
HGB BLD-MCNC: 10.4 G/DL (ref 12–16)
IMM GRANULOCYTES # BLD AUTO: 0.1 K/UL (ref 0–0.11)
IMM GRANULOCYTES NFR BLD AUTO: 0.9 % (ref 0–0.9)
LYMPHOCYTES # BLD AUTO: 2.24 K/UL (ref 1–4.8)
LYMPHOCYTES NFR BLD: 21.2 % (ref 22–41)
MCH RBC QN AUTO: 30.9 PG (ref 27–33)
MCHC RBC AUTO-ENTMCNC: 33.7 G/DL (ref 33.6–35)
MCV RBC AUTO: 91.7 FL (ref 81.4–97.8)
MONOCYTES # BLD AUTO: 1.08 K/UL (ref 0–0.85)
MONOCYTES NFR BLD AUTO: 10.2 % (ref 0–13.4)
NEUTROPHILS # BLD AUTO: 6.85 K/UL (ref 2–7.15)
NEUTROPHILS NFR BLD: 64.7 % (ref 44–72)
NRBC # BLD AUTO: 0 K/UL
NRBC BLD-RTO: 0 /100 WBC
PLATELET # BLD AUTO: 185 K/UL (ref 164–446)
PMV BLD AUTO: 11.2 FL (ref 9–12.9)
POTASSIUM SERPL-SCNC: 4 MMOL/L (ref 3.6–5.5)
RBC # BLD AUTO: 3.37 M/UL (ref 4.2–5.4)
SODIUM SERPL-SCNC: 130 MMOL/L (ref 135–145)
WBC # BLD AUTO: 10.6 K/UL (ref 4.8–10.8)

## 2018-07-20 PROCEDURE — 700102 HCHG RX REV CODE 250 W/ 637 OVERRIDE(OP): Performed by: CLINICAL NURSE SPECIALIST

## 2018-07-20 PROCEDURE — 700111 HCHG RX REV CODE 636 W/ 250 OVERRIDE (IP): Performed by: NURSE PRACTITIONER

## 2018-07-20 PROCEDURE — 700101 HCHG RX REV CODE 250: Performed by: CLINICAL NURSE SPECIALIST

## 2018-07-20 PROCEDURE — 96372 THER/PROPH/DIAG INJ SC/IM: CPT

## 2018-07-20 PROCEDURE — G0378 HOSPITAL OBSERVATION PER HR: HCPCS

## 2018-07-20 PROCEDURE — 700105 HCHG RX REV CODE 258: Performed by: NURSE PRACTITIONER

## 2018-07-20 PROCEDURE — 700101 HCHG RX REV CODE 250: Performed by: NURSE PRACTITIONER

## 2018-07-20 PROCEDURE — 85025 COMPLETE CBC W/AUTO DIFF WBC: CPT

## 2018-07-20 PROCEDURE — A9270 NON-COVERED ITEM OR SERVICE: HCPCS | Performed by: CLINICAL NURSE SPECIALIST

## 2018-07-20 PROCEDURE — 80048 BASIC METABOLIC PNL TOTAL CA: CPT

## 2018-07-20 PROCEDURE — 36415 COLL VENOUS BLD VENIPUNCTURE: CPT

## 2018-07-20 PROCEDURE — 700112 HCHG RX REV CODE 229: Performed by: CLINICAL NURSE SPECIALIST

## 2018-07-20 RX ORDER — SODIUM CHLORIDE 9 MG/ML
500 INJECTION, SOLUTION INTRAVENOUS ONCE
Status: COMPLETED | OUTPATIENT
Start: 2018-07-20 | End: 2018-07-20

## 2018-07-20 RX ORDER — OXYCODONE HYDROCHLORIDE 10 MG/1
10-20 TABLET ORAL EVERY 4 HOURS PRN
Status: ON HOLD
Start: 2018-07-20 | End: 2018-08-09

## 2018-07-20 RX ORDER — ENEMA 19; 7 G/133ML; G/133ML
1 ENEMA RECTAL
Status: ON HOLD
Start: 2018-07-20 | End: 2018-08-09

## 2018-07-20 RX ORDER — PSEUDOEPHEDRINE HCL 30 MG
100 TABLET ORAL 2 TIMES DAILY
Qty: 60 CAP | Status: ON HOLD
Start: 2018-07-20 | End: 2018-08-09

## 2018-07-20 RX ORDER — BISACODYL 10 MG
10 SUPPOSITORY, RECTAL RECTAL
Refills: 0 | Status: ON HOLD
Start: 2018-07-20 | End: 2018-08-09

## 2018-07-20 RX ORDER — DIPHENHYDRAMINE HCL 25 MG
25 TABLET ORAL EVERY 6 HOURS PRN
Qty: 30 TAB | Refills: 0 | Status: ON HOLD
Start: 2018-07-20 | End: 2018-08-09

## 2018-07-20 RX ORDER — TIZANIDINE 2 MG/1
2 TABLET ORAL 3 TIMES DAILY PRN
Qty: 30 TAB | Refills: 3 | Status: ON HOLD
Start: 2018-07-20 | End: 2018-08-09

## 2018-07-20 RX ORDER — ONDANSETRON 4 MG/1
4 TABLET, ORALLY DISINTEGRATING ORAL EVERY 4 HOURS PRN
Qty: 10 TAB | Refills: 0 | Status: ON HOLD
Start: 2018-07-20 | End: 2018-08-09

## 2018-07-20 RX ORDER — BACITRACIN ZINC AND POLYMYXIN B SULFATE 500; 1000 [USP'U]/G; [USP'U]/G
OINTMENT TOPICAL 2 TIMES DAILY
Status: DISCONTINUED | OUTPATIENT
Start: 2018-07-20 | End: 2018-07-23 | Stop reason: HOSPADM

## 2018-07-20 RX ORDER — CALCIUM CARBONATE 500 MG/1
500 TABLET, CHEWABLE ORAL 2 TIMES DAILY
Qty: 30 TAB
Start: 2018-07-20

## 2018-07-20 RX ADMIN — STANDARDIZED SENNA CONCENTRATE AND DOCUSATE SODIUM 1 TABLET: 8.6; 5 TABLET, FILM COATED ORAL at 17:32

## 2018-07-20 RX ADMIN — GABAPENTIN 600 MG: 300 CAPSULE ORAL at 12:07

## 2018-07-20 RX ADMIN — OXYCODONE HYDROCHLORIDE 20 MG: 10 TABLET ORAL at 09:19

## 2018-07-20 RX ADMIN — OXYCODONE HYDROCHLORIDE 20 MG: 10 TABLET ORAL at 20:29

## 2018-07-20 RX ADMIN — ANTACID TABLETS 500 MG: 500 TABLET, CHEWABLE ORAL at 17:32

## 2018-07-20 RX ADMIN — ANTACID TABLETS 500 MG: 500 TABLET, CHEWABLE ORAL at 06:55

## 2018-07-20 RX ADMIN — GABAPENTIN 600 MG: 300 CAPSULE ORAL at 06:56

## 2018-07-20 RX ADMIN — DOCUSATE SODIUM 100 MG: 100 CAPSULE, LIQUID FILLED ORAL at 17:32

## 2018-07-20 RX ADMIN — TIZANIDINE 2 MG: 4 TABLET ORAL at 17:33

## 2018-07-20 RX ADMIN — POTASSIUM CHLORIDE AND SODIUM CHLORIDE: 900; 150 INJECTION, SOLUTION INTRAVENOUS at 20:29

## 2018-07-20 RX ADMIN — SODIUM CHLORIDE 500 ML: 9 INJECTION, SOLUTION INTRAVENOUS at 10:12

## 2018-07-20 RX ADMIN — OXYCODONE HYDROCHLORIDE 20 MG: 10 TABLET ORAL at 14:27

## 2018-07-20 RX ADMIN — ENOXAPARIN SODIUM 40 MG: 100 INJECTION SUBCUTANEOUS at 20:29

## 2018-07-20 RX ADMIN — POLYVINYL ALCOHOL 1 DROP: 14 SOLUTION/ DROPS OPHTHALMIC at 06:56

## 2018-07-20 RX ADMIN — CHOLECALCIFEROL TAB 25 MCG (1000 UNIT) 2000 UNITS: 25 TAB at 06:56

## 2018-07-20 RX ADMIN — SODIUM CHLORIDE 500 ML: 9 INJECTION, SOLUTION INTRAVENOUS at 04:17

## 2018-07-20 RX ADMIN — Medication 1 EACH: at 17:32

## 2018-07-20 RX ADMIN — GABAPENTIN 600 MG: 300 CAPSULE ORAL at 17:31

## 2018-07-20 RX ADMIN — DOCUSATE SODIUM 100 MG: 100 CAPSULE, LIQUID FILLED ORAL at 06:55

## 2018-07-20 RX ADMIN — POTASSIUM CHLORIDE AND SODIUM CHLORIDE: 900; 150 INJECTION, SOLUTION INTRAVENOUS at 04:18

## 2018-07-20 RX ADMIN — POLYVINYL ALCOHOL 1 DROP: 14 SOLUTION/ DROPS OPHTHALMIC at 18:00

## 2018-07-20 ASSESSMENT — PAIN SCALES - GENERAL
PAINLEVEL_OUTOF10: 8
PAINLEVEL_OUTOF10: 6
PAINLEVEL_OUTOF10: 8
PAINLEVEL_OUTOF10: 4
PAINLEVEL_OUTOF10: 10
PAINLEVEL_OUTOF10: 2
PAINLEVEL_OUTOF10: 4

## 2018-07-20 NOTE — PROGRESS NOTES
Manual BP 84/50 on RUE, pt reports dizziness/lightheaded feelings, denies chest pain or SOB. Pain still minimal at this time. No pain meds given for duration of shift and pt was more fatigued than usual this shift.     Paging Sharon Sin regarding latest events.    Orders for CBC, 500cc bolus now, and to hold morning BP meds for SBP <100.

## 2018-07-20 NOTE — THERAPY
Attempted to see pt for OT tx. Pt hypotensive and able to receive IV bolus. Will try again later as appropriate.

## 2018-07-20 NOTE — DISCHARGE SUMMARY
DATE OF ADMISSION:  07/17/2018    DATE OF DISCHARGE:  07/20/2018    ADMITTING DIAGNOSIS:  Critical lumbar central canal stenosis at L2 through L5   with severe motor and sensory radiculopathy recalcitrant to nonoperative   measures.    COURSE OF HOSPITALIZATION:  The patient was admitted to [facility].  On date of admission, she was taken to the operating room and   underwent an L2 through L5 lumbar laminectomy with posterior lateral   arthrodesis L2-L5 with Dr. Robel Perez.    Postoperatively, we have been working on pain control.  Patient complains of   back pain.  She does not have leg pain.  She does have numbness and tingling   from her knees down to her feet, which is unchanged since surgery.  Her lower   extremity strength, her iliopsoas and quads bilaterally are 5/5, bilateral   plantar flexion 4+/5, bilateral EHL 3/5, right tibialis anterior 3/5, left   tibialis anterior 1-2/5.  Overall, her strength has shown some improvement   since surgery.  She did have some issues with hypotension last night with her   systolic blood pressure in 80s and 90s.  I am going to give her a fluid bolus   this morning.  They held her pain medication last night secondary to this.  I   have discussed with the nurse not to hold her pain medication.  We will hold   her blood pressure medication if her systolic is less than 110.  She is   voiding.  She has had a bowel movement.  She is working with therapy.  She is   to wear her LSO brace when out of bed.  Her lab values are stable.  Her sodium   is 131 yesterday, so we will need to keep an eye on this.  I will repeat the   labs today before she transfers.  She will be transferred over to [facility].    RECOMMENDED MEDICATIONS:  1.  Artificial tears 1 drop both eyes 2 times a day.  1.  Dulcolax suppository 10 mg DE every 24 hours p.r.n. constipation.  2.  Tums 500 mg p.o. b.i.d.  3.  Diltiazem 240 mg p.o. q. day.  4.  Benadryl 25 mg p.o. every 6  hours p.r.n. itching.  5.  Colace 100 mg p.o. b.i.d.  6.  Lovenox 40 mg subQ daily, first dose to start tonight at 2100.  7.  Fleet enema SD daily p.r.n. constipation.  8.  Neurontin 600 mg p.o. t.i.d.  9.  Hydrochlorothiazide 25 mg p.o. daily.  10.  Lisinopril 20 mg p.o. daily.  11.  Milk of magnesia 30 mL p.o. daily p.r.n. constipation.  12.  Zofran 4 mg ODT every 4 hours p.r.n. nausea.  13.  Oxycodone IR 10-20 mg every 4 hours p.r.n. moderate pain.  14.  Lyrica 300 mg p.o. daily.  15.  Zanaflex 2 mg p.o. t.i.d. p.r.n. muscle spasms.  16.  Vitamin D 2000 units p.o. daily.    DISCHARGE INSTRUCTIONS:  The patient will follow up with our office at 2 and 6   weeks postoperatively.  She may shower starting tomorrow morning, let soap   and water run-off her incision, pat it dry after shower.  No ointments, creams   or dressings on the incision.  She is to wear her LSO brace at all times when   out of bed.  No anti-inflammatories or aspirin until cleared.  No lifting   greater than 10 pounds.  No repetitive bending.  Any further questions or   concerns, please do not hesitate to contact our office.  She will be   discharged over to [facility] today in stable medical condition.       ____________________________________     SHERIE KOLB / SATISH    DD:  07/20/2018 09:15:42  DT:  07/20/2018 09:48:34    D#:  [id number]  Job#:  [id number]

## 2018-07-20 NOTE — PROGRESS NOTES
Pt A&Ox4, denies pain att his time,  in place and satting well on 3L O2 via nasal cannula.   BP 80/50, per order 500cc bolus initiated, will recheck BP in 4 hours and do another bolus if SBP<90.   Incision to back CDI, hvac still in place with serosanguinous output.   No changes from last assessment.   POC discussed, no further needs att his time, call light within reach, bed alarm armed.

## 2018-07-20 NOTE — CARE PLAN
Problem: Safety  Goal: Will remain free from falls    Intervention: Implement fall precautions  Bed alarm armed, call light within reach and used appropriately, treaded socks on, FWW in bathroom out of patient view.       Problem: Pain Management  Goal: Pain level will decrease to patient's comfort goal    Intervention: Follow pain managment plan developed in collaboration with patient and Interdisciplinary Team  PRN oxycodone held for beginning of shift due to hypotension, pain minimal, will continue to hold pain meds while BP is low.       Problem: Respiratory:  Goal: Respiratory status will improve    Intervention: Assess and monitor pulmonary status   in place, was desaturating during day shift, currently weaned from 3 to 2L benjamín nasal cannula. Will continue to monitor.

## 2018-07-20 NOTE — PROGRESS NOTES
Neurosurgery Progress Note     Subjective:  Doing well  Pain not well controlled today  Pain meds held last night d/t low bp  c/o lbp, no leg pain, n/t from knees down unchanged    Exam:  bilat IP/quad 5/5, bilat PF 4+/5, bilat EHL 3/5, right DF 3/5, left DF 1-2-- overall improved from pre-op    BP  Min: 80/53  Max: 110/64  Pulse  Av.3  Min: 63  Max: 86  Resp  Av.5  Min: 16  Max: 18  Temp  Av °C (98.6 °F)  Min: 36.8 °C (98.2 °F)  Max: 37.3 °C (99.1 °F)  SpO2  Av %  Min: 93 %  Max: 96 %    No Data Recorded    Recent Labs      18   0136  18   0517   WBC  11.7*  11.5*  10.6   RBC  4.05*  3.57*  3.37*   HEMOGLOBIN  12.3  10.9*  10.4*   HEMATOCRIT  35.9*  32.2*  30.9*   MCV  88.6  90.2  91.7   MCH  30.4  30.5  30.9   MCHC  34.3  33.9  33.7   RDW  41.2  43.2  44.1   PLATELETCT  230  183  185   MPV  10.8  11.3  11.2     Recent Labs      18   0136   SODIUM  137  131*   POTASSIUM  3.8  3.9   CHLORIDE  102  96   CO2  28  27   GLUCOSE  127*  113*   BUN  14  26*   CREATININE  0.67  1.39   CALCIUM  8.9  8.5               Intake/Output       18 07 - 18 0659 18 - 18 0659       Total  Total       Intake    P.O.  --  120 120  --  -- --    P.O. -- 120 120 -- -- --    I.V.  --  900 900  --  -- --    IV Volume (IV bolus) -- 900 900 -- -- --    Total Intake -- 1020 1020 -- -- --       Output    Urine  950  -- 950  --  -- --    Number of Times Voided -- 1 x 1 x -- -- --    Urine Void (mL) (non-catheter) 950 -- 950 -- -- --    Drains  110  35 145  --  -- --    Output (ml) ([REMOVED] Surgical Drain [facility] Back Hemovac 1 (A)) 110 35 145 -- -- --    Stool  --  -- --  --  -- --    Number of Times Stooled -- 1 x 1 x -- -- --    Total Output 1060 35 1095 -- -- --       Net I/O     -1060 985 -75 -- -- --            Intake/Output Summary (Last 24 hours) at 18 0900  Last data filed at 18 0600    Gross per 24 hour   Intake             1020 ml   Output             1095 ml   Net              -75 ml            • NS  500 mL Once   • oxyCODONE immediate-release  10-20 mg Q4HRS PRN   • vitamin D  2,000 Units DAILY   • gabapentin  600 mg TID   • hydroCHLOROthiazide  25 mg DAILY   • lisinopril  20 mg DAILY   • pregabalin  300 mg DAILY   • Pharmacy Consult Request  1 Each PRN   • MD ALERT...Do not administer NSAIDS or ASPIRIN unless ORDERED By Neurosurgery  1 Each PRN   • docusate sodium  100 mg BID   • senna-docusate  1 Tab Nightly   • senna-docusate  1 Tab Q24HRS PRN   • polyethylene glycol/lytes  1 Packet BID PRN   • magnesium hydroxide  30 mL QDAY PRN   • bisacodyl  10 mg Q24HRS PRN   • fleet  1 Each Once PRN   • 0.9 % NaCl with KCl 20 mEq 1,000 mL   Continuous   • diphenhydrAMINE  25 mg Q6HRS PRN    Or   • diphenhydrAMINE  25 mg Q6HRS PRN   • ondansetron  4 mg Q4HRS PRN   • ondansetron  4 mg Q4HRS PRN   • promethazine  12.5-25 mg Q4HRS PRN   • promethazine  12.5-25 mg Q4HRS PRN   • prochlorperazine  5-10 mg Q4HRS PRN   • tizanidine  2 mg TID PRN   • labetalol  10 mg Q HOUR PRN   • cloNIDine  0.1 mg Q4HRS PRN   • benzocaine-menthol  1 Lozenge Q2HRS PRN   • calcium carbonate  500 mg BID   • DILTIAZem CD  240 mg Q DAY   • artificial tears  1 Drop BID       Assessment and Plan:  POD #3 L2-5 lami/posterolat fusion  Prophylactic anticoagulation: yes      Start date/time: tonight     Plan:  Pt/ot/mobilize  Fluid bolus 500 cc now  Hold bp meds for sbp <110  Hyponatremia - monitor  D/w RN to give oral pain meds  Rehab today

## 2018-07-20 NOTE — CARE PLAN
Problem: Communication  Goal: The ability to communicate needs accurately and effectively will improve  Outcome: PROGRESSING AS EXPECTED      Problem: Bowel/Gastric:  Goal: Normal bowel function is maintained or improved  Outcome: PROGRESSING AS EXPECTED  Pt had bowel movement after midnight.

## 2018-07-20 NOTE — PREADMISSION SCREENING NOTE
Updated Pre-Screen Assessment     Name: Sari Ceja  MRN: [id number]  : 1965    Medical Status/ Changes:     JOVANA Jones Nurse Practitioner Cosign Needed Addendum Surgery Neurosurgery  Progress Notes Date of Service: 2018  8:48 AM         []Hide copied text  Neurosurgery Progress Note     Subjective:  Pt awake, alert  Pain improving  No rad pain  N/t feet unchanged  Voiding  + bm     Exam:  bilat IP/quad 5/5, GS bilat 4+-5/5 bilat EHL 3/5, right DF 3/5, left DF 1-2  Inc c/d/I with staples, minor skin tears to left side of incision - covered     BP  Min: 104/56  Max: 127/80  Pulse  Av.3  Min: 70  Max: 84  Resp  Av.3  Min: 16  Max: 17  Temp  Av °C (98.6 °F)  Min: 36.4 °C (97.6 °F)  Max: 37.7 °C (99.8 °F)  SpO2  Av %  Min: 92 %  Max: 94 %     No Data Recorded             Recent Labs      18   0023   SODIUM  136   POTASSIUM  4.4   CHLORIDE  102   CO2  27   GLUCOSE  112*   BUN  21   CREATININE  0.90   CALCIUM  8.7                       Intake/Output        18 07 - 18 0659 18 - 18 0659        4802-1783 Total 0-0659 Total                Intake     Total Intake -- -- -- -- -- --                Output     Urine  --  -- --  --  -- --     Number of Times Voided 4 x 1 x 5 x -- -- --     Stool  --  -- --  --  -- --     Number of Times Stooled -- 1 x 1 x -- -- --     Total Output -- -- -- -- -- --                Net I/O       -- -- -- -- -- --             No intake or output data in the 24 hours ending 18 0848        • enoxaparin (LOVENOX) injection  40 mg QHS   • bacitracin-polymyxin b   BID   • oxyCODONE immediate-release  10-20 mg Q4HRS PRN   • vitamin D  2,000 Units DAILY   • gabapentin  600 mg TID   • hydroCHLOROthiazide  25 mg DAILY   • lisinopril  20 mg DAILY   • pregabalin  300 mg DAILY   • Pharmacy Consult Request  1 Each PRN   • MD ALERT...Do not administer NSAIDS or ASPIRIN unless ORDERED By  Neurosurgery  1 Each PRN   • docusate sodium  100 mg BID   • senna-docusate  1 Tab Nightly   • senna-docusate  1 Tab Q24HRS PRN   • polyethylene glycol/lytes  1 Packet BID PRN   • magnesium hydroxide  30 mL QDAY PRN   • bisacodyl  10 mg Q24HRS PRN   • fleet  1 Each Once PRN   • 0.9 % NaCl with KCl 20 mEq 1,000 mL   Continuous   • diphenhydrAMINE  25 mg Q6HRS PRN     Or   • diphenhydrAMINE  25 mg Q6HRS PRN   • ondansetron  4 mg Q4HRS PRN   • ondansetron  4 mg Q4HRS PRN   • promethazine  12.5-25 mg Q4HRS PRN   • promethazine  12.5-25 mg Q4HRS PRN   • prochlorperazine  5-10 mg Q4HRS PRN   • tizanidine  2 mg TID PRN   • labetalol  10 mg Q HOUR PRN   • cloNIDine  0.1 mg Q4HRS PRN   • benzocaine-menthol  1 Lozenge Q2HRS PRN   • calcium carbonate  500 mg BID   • DILTIAZem CD  240 mg Q DAY   • artificial tears  1 Drop BID         Assessment and Plan:  POD #6 L2-5 lami/posterolat fusion  Prophylactic anticoagulation: yes      Start date/time: 7/20/18     Plan:  Pt/ot/mobilize  Hold bp meds for sbp <110  Cont pain regimen  Bowel/bladder protocol   Rehab pending insurance authorization  Ok to dc today once accepted                Functional Status/ Changes:     Madalyn Hager, PT Physical Therapist Signed   Therapy Date of Service: 7/23/2018 11:29 AM         []Hide copied text  Physical Therapy Treatment completed.   Bed Mobility:  Supine to Sit: Contact Guard Assist (bed flat, used rail (has rail at home))  Transfers: Sit to Stand: Contact Guard Assist (from EOB->FWW)  Gait: Level Of Assist: Contact Guard Assist with 4-Wheel Walker  x25 ft x2 reps     Plan of Care: Will benefit from Physical Therapy 4 times per week  Discharge Recommendations: Equipment: No Equipment Needed. Post-acute therapy Discharge to a transitional care facility for continued skilled therapy services.      Patient is progressing mobility. She was able to perform supine<>sit with CGA and minimal cues for log roll tech. Patient is limited by decreased  "activity tolerance, back pain, and PERI LE weakness. Patient with PERI dorsiflexor weakness and ankle instability. Will continue to follow while in acute care. D/c pending LOS and progress, at this point would still recommend post acute services, however patient may progress to be able to d/c to home with OP PT or HH PT if she remains in acute care for extended period of time.      See \"Rehab Therapy-Acute\" Patient Summary Report for complete documentation.                        Reviewer: Navi Conde  Date: 2018  Time: 3:01 PM  Pre-Admission Screening Form    Patient Information:   Name: Sari Ceja     MRN: [id number]       : 1965      Age: 53 y.o.   Gender: female      Race:  or  [4]       Marital Status:  [4]  Family Contact: DwightElaine        Relationship: Sister [14]  Home Phone: [phone]           Cell Phone:   Advanced Directives: None  Code Status:  FULL  Current Attending Provider: Robel Perez MD  Referring Physician: Dr. Perez    Physiatrist Consult: Dr. Adelita Hunt       Referral Date: 18  Primary Payor Source:  MEDICAID FFS  Secondary Payor Source:      Medical Information:   Date of Admission to Acute Care [place]2018  Room Number: S139/00  Rehabilitation Diagnosis: 08.9 Other Orthopedic  Immunization History   Administered Date(s) Administered   • Influenza TIV (IM) 2017     No Known Allergies  Past Medical History:   Diagnosis Date   • Anesthesia     \"woke up during surgery\"   • Arthritis    • Bowel habit changes     constipation   • Dental disorder     partial   • High cholesterol    • Hypertension    • Pain     both knees, right shoulder, hip area   • Renal disorder     kidney infection   • Unspecified hemorrhagic conditions     \"pulled IV from groin, had to hold pressure to stop bleeding and stayed additional day in the hospital\"   • Urinary incontinence      Past Surgical History:   Procedure " Laterality Date   • LUMBAR FUSION POSTERIOR  7/17/2018    Procedure: LUMBAR FUSION POSTERIOR- IN SITU POSS;  Surgeon: Robel Perez M.D.;  Location: [facility];  Service: Neurosurgery   • LUMBAR LAMINECTOMY DISKECTOMY  7/17/2018    Procedure: LUMBAR LAMINECTOMY DISKECTOMY L2-5;  Surgeon: Robel Perez M.D.;  Location: [facility];  Service: Neurosurgery   • DEBRIDEMENT  12/5/2013    Performed by Doni Merida M.D. at [facility]   • HIP ARTHROSCOPY  12/5/2013    Performed by Doni Merida M.D. at [facility]   • ORTHOPEDIC OSTEOTOMY  12/5/2013    Performed by Doni Merida M.D. at [facility]   • INCISION AND DRAINAGE GENERAL  1993    from infected tooth   • OTHER ORTHOPEDIC SURGERY      Michael knee replacement       History Leading to Admission, Conditions that Caused the Need for Rehab (CMS):      Draft: Not Electronically Signed.   Robel Perez M.D. Physician Unsigned Transcription Surgery Neurosurgery  OP Report Date of Service: 7/18/2018 11:50 AM         []Hide copied text  []Hover for attribution information  DATE OF SERVICE:  07/17/2018     PREOPERATIVE DIAGNOSES:  Critical lumbar central canal stenosis at lumbar 2   through 5 with severe motor and sensory radiculopathy recalcitrant to   nonoperative measures.     POSTOPERATIVE DIAGNOSES:  Critical lumbar central canal stenosis at lumbar 2   through 5 with severe motor and sensory radiculopathy recalcitrant to   nonoperative measures.     PROCEDURES:  1.  Lumbar 2 laminectomy with decompression of lumbar 2 roots.  2.  Lumbar 3 laminectomy with decompression of lumbar 3 roots.  3.  Lumbar 4 laminectomy with decompression of lumbar 4 roots.  4.  Lumbar 5 laminectomy with decompression of lumbar 5 roots.  5.  Posterolateral arthrodesis, lumbar 2, 3, 4, and 5.  6.  Use of locally harvested morselized autograft.  7.  Use of allograft.     SURGEON:  Robel Perez,  "MD     ASSISTANT:  SHERIE Jeronimo     ANESTHESIA:  General.     COMPLICATIONS:  None.     ESTIMATED BLOOD LOSS:  200 mL.        Adelita Hunt M.D. Physician Signed Physical Medicine & Rehab  Consults Date of Service: 7/19/2018  2:13 PM   Consult Orders:   IP CONSULT FOR PHYSIATRY [[id number]] ordered by Robel Perez M.D. at 07/19/18 1156      Expand All Collapse All    []Hide copied text  []Hover for attribution information  Physical Medicine and Rehabilitation Consultation     Date of Consultation: 7/19/2018  Consulting provider: Robel Perez MD  Reason for consultation: assess for acute inpatient rehab appropriateness  Chief complaint: leg weakness and numbness     HPI: The patient is a 53 y.o. female with a past medical history of hypertension, lumbar spinal stenosis, admitted on 7/17/2018  5:36 AM for an elective lumbar surgery.     Per review of the medical record and confirmation with the patient, she had critical  Lumbar stenosis with severe L2-5 sensory and motor radiculopathy for which she was admitted for a L2-5 decompression and posterolateral arthrodesis with Dr. Perez on 7/18. She has used 90 OME in last 12 hours.      The patient currently reports she was followed for many years by Dr. Mendoza for pain. She started having pain and numbness in her right leg this February, had a \"back injection\" that helped, then had symptoms on the left leg in March, had an epidural which did not help and made her pain worse. After the epidural she then had weakness in her ankles bilaterally. She then developed worsened pain, as well as saddle anesthesia, for months prior to her getting a referral to neurosurgery. She even saw a podiatrist for the numbness in her feet, and she got an ankle brace. She states she had bladder incontinence prior to her surgery because she couldn't feel when her bladder was full. She still reports numbness in her legs/feet/saddle area, but there is no more " burning pain. Still has severe weakness at her ankles. She hasn't moved her bowels since surgery. 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     ROS:  A comprehensive review of systems was completed and is negative unless otherwise noted in the above HPI.     Social Hx:  Pre-morbidly, this patient lived in a single level home with no steps to enter, with her ex-.  Denies tobacco, alcohol, uses marijuana occasionally.     Prior level of function:   Independent,      Current level of function:  The patient was evaluated by acute care Physical Therapy and Occupational Therapy; currently requiring minimal to moderate assistance for mobility and minimal to moderate assistance for ADLs.     6 clicks score 10 mobility, 18 ADLs         ASSESSMENT:     Patient is a 53 y.o. female admitted with critical lumbar stenosis causing severe motor and sensory radiculopathy, s/p L2-5 decompression and posterolateral arthrodesis with Dr. Perez on 7/18. Patient with bilateral foot drop, and abnormal sensation from L2-S3.      Patient with multiple co-morbidities(including but not limited to hypertension, urinary incontinence, neurogenic bladder, constipation); with functional deficits in mobility/self-care, and Severe de-conditioning.      Pre-morbidly, this patient lived in a single level home with no steps to enter, with her ex-. The patient was evaluated by acute care Physical Therapy and Occupational Therapy; currently requiring minimal to moderate assistance for mobility and minimal to moderate assistance for ADLs.     6 clicks score 10 mobility, 18 ADLs     The patient is a(n) Excellent candidate for an acute inpatient rehabilitation program with a coordinated program of care at an intensity and frequency not available at a lower level of care.      Note: if patient continues to progress while waiting for medical clearance, and  no longer requires 2 of of 3 therapy services (PT/OT/SLP), patient will no longer need acute inpatient rehabilitation.     This recommendation is substantiated by the patient's current medical condition with intervention and assessment of medical issues requiring an acute level of care for patient's safety and maximum outcome. A coordinated program of care will be provided by an interdisciplinary team including physical therapy, occupational therapy, physiatry, rehab nursing and rehab psychology. Rehab goals include improved mobility, self-care management, strength and conditioning/endurance, pain management, bowel and bladder management, mood and affect, and safety with independent home management including caregiver training.      Estimated length of stay is approximately 14-21 days. Rehab potential: Excellent. Disposition: to pre-morbid independent living setting with supportive care of patient's family. We will continue to follow with you in anticipation of discharge to acute inpatient rehabilitation when medically stable to do so at the discretion of her  attending physician. Thank you for allowing us to participate in her care. Please call with any questions regarding this recommendation.     Adelita Hunt M.D.  Physical Medicine and Rehabilitation     Co-morbidities: See above  Potential Risk - Complications: Deep Vein Thrombosis, Malnutrition, Pain, Perceptual Impairment, Pneumonia, Pressure Ulcer and Infection  Level of Risk: High    Ongoing Medical Management Needed (Medical/Nursing Needs):   Patient Active Problem List    Diagnosis Date Noted   • Lumbar stenosis with neurogenic claudication 07/20/2018   • Arthralgia of knee, right 04/10/2017   • Essential hypertension 03/27/2017   • Family history of coronary artery disease 03/13/2017   • Articular cartilage disorder, pelvic region and thigh 12/05/2013   • Disability examination 05/09/2012     Tata See R.N. Registered Nurse Signed   Progress  "Notes Date of Service: 7/19/2018  8:51 PM         []Hide copied text  []Hover for attribution information  Pt A&Ox4, denies pain att his time,  in place and satting well on 3L O2 via nasal cannula.   BP 80/50, per order 500cc bolus initiated, will recheck BP in 4 hours and do another bolus if SBP<90.   Incision to back CDI, hvac still in place with serosanguinous output.   No changes from last assessment.   POC discussed, no further needs att his time, call light within reach, bed alarm armed.         Current Vital Signs:   Temperature: 37 °C (98.6 °F) Pulse: 94 Respiration: 17 Blood Pressure: (!) 91/59  Weight: 92.8 kg (204 lb 9.4 oz) Height: 165.1 cm (5' 5\")  Pulse Oximetry: 92 % O2 (LPM): 3      Completed Laboratory Reports:  Recent Labs      07/18/18   0225  07/19/18   0136  07/20/18   0517   WBC  11.7*  11.5*  10.6   HEMOGLOBIN  12.3  10.9*  10.4*   HEMATOCRIT  35.9*  32.2*  30.9*   PLATELETCT  230  183  185   SODIUM  137  131*  130*   POTASSIUM  3.8  3.9  4.0   BUN  14  26*  37*   CREATININE  0.67  1.39  1.81*   GLUCOSE  127*  113*  115*     Additional Labs: Not Applicable    Prior Living Situation:   Housing / Facility: 1 Story Apartment / Condo  Steps Into Home: 0  Lives with - Patient's Self Care Capacity: Other (Comments) (ex )  Equipment Owned: 4-Wheel Walker, Single Point Cane    Prior Level of Function / Living Situation:   Physical Therapy: Prior Services: Housekeeping / Homemaker Services  Housing / Facility: 1 Story Apartment / Condo  Steps Into Home: 0  Bathroom Set up: Bathtub / Shower Combination, Grab Bars  Equipment Owned: 4-Wheel Walker, Single Point Cane  Lives with - Patient's Self Care Capacity: Other (Comments) (ex )  Bed Mobility: Independent  Transfer Status: Independent  Ambulation: Independent  Assistive Devices Used: 4-Wheel Walker  Stairs: Required Assist  Current Level of Function:   Level Of Assist: Minimal Assist  Assistive Device: 4 Wheel Walker  Distance (Feet): " 70  Deviation: Step To, Antalgic, Bradykinetic  Skilled Intervention: Verbal Cuing  Comments: Pt w/bilat foot drop that she had PTA, has learned to compensate. Improvement in amb distance from previous session. Standing rest breaks for UE fatigue and for upright posture.   Supine to Sit: Contact Guard Assist (HOB flat and no rail)  Sit to Supine:  (pt left up in the chair)  Scooting: Supervised (seated)  Rolling: Supervised (to the Lft w/no rail)  Comments: Instructed on log rolling technique to maintain neutral spine w/bed moblity.   Sit to Stand: Contact Guard Assist (from EOB->FWW)  Bed, Chair, Wheelchair Transfer: Contact Guard Assist (w/FWW)  Toilet Transfers: Minimal Assist  Transfer Method: Stand Pivot  Sitting in Chair: 10+ mins (left up in chair)  Sitting Edge of Bed: 3 mins  Standing: 10 minutes  Occupational Therapy:   Self Feeding: Independent  Grooming / Hygiene: Independent  Bathing: Independent  Dressing: Independent  Toileting: Independent  Medication Management: Independent  Laundry: Independent  Kitchen Mobility: Requires Assist  Home Management: Requires Assist  Shopping: Independent  Prior Level Of Mobility: Independent With Device in Community, Independent With Device in Home  Driving / Transportation: Driving Independent (Expresses discomfort with driving)  Prior Services: Housekeeping / Homemaker Services  [facility]  Current Level of Function:   Lower Body Dressing: Moderate Assist  Toileting: Minimal Assist  Speech Language Pathology:      Rehabilitation Prognosis/Potential: Good  Estimated Length of Stay: 14 days    Nursing:   Orientation : Oriented x 4  Continent    Scope/Intensity of Services Recommended:  Physical Therapy: 1.5 hr / day  5 days / week. Therapeutic Interventions Required: Maximize Endurance, Mobility, Strength and Safety  Occupational Therapy: 1.5 hr / day 5 days / week. Therapeutic Interventions Required: Maximize Self Care, ADLs, IADLs  and Energy Conservation  Rehabilitation Nursin/7. Therapeutic Interventions Required: Monitor Pain, Skin, Vital Signs, Intake and Output, Labs, Safety, Family Training and Lumbar surgical incision care; Bowel & Bladder regimen; DVT prophylaxis; ADL's.   Rehabilitation Physician: 3 - 5 days / week. Therapeutic Interventions Required: Medical Management  Respiratory Care: Daily. Therapeutic Interventions Required: Pulmonary Toileting, O2 Weaning and Respiratroy care per protocol.   Dietician: Consult. Therapeutic Interventions Required: Nutritional evaluation with recommedations to promote optimal health/healing.     Rehabilitation Goals and Plan (Expected frequency & duration of treatment in the IRF):   Return to the Community, Modified Independent Level of Care and Outpatient Support  Anticipated Date of Rehabilitation Admission: 18  Patient/Family oriented IRF level of care/facility/plan: Yes  Patient/Family willing to participate in IRF care/facility/plan: Yes  Patient able to tolerate IRF level of care proposed: Yes  Patient has potential to benefit IRF level of care proposed: Yes  Comments: Not Applicable    Special Needs or Precautions - Medical Necessity:  Safety Concerns/Precautions:  Fall Risk / High Risk for Falls and Balance  Complex Wound Care: Lumbar surgical incision care  Pain Management  Splints/Braces/Orthotics: LSO brace at all times when out of bed.  Lumbar spine precautions. No lifting greater than 10 pounds. No repetitive bending.   Diet:   DIET ORDERS (Through next 24h)    Start     Ordered    18 1639  Diet Order Regular  ALL MEALS     Question:  Diet:  Answer:  Regular    18 1638          Anticipated Discharge Destination / Patient/Family Goal:  Destination: Home with Assistance Support System: Family   Anticipated home health services: OT, PT and Nursing  Previously used HH service/ provider: Not Applicable  Anticipated DME Needs: To be determined  Outpatient Services: To  be determined  Alternative resources to address additional identified needs:     Follow up with Neurosurgery at 2 and 6 weeks postoperatively. Appointment to be scheduled.    Pre-Screen Completed: 7/20/2018 1:20 PM Brittany Ying R.N.

## 2018-07-21 LAB
ANION GAP SERPL CALC-SCNC: 7 MMOL/L (ref 0–11.9)
BUN SERPL-MCNC: 21 MG/DL (ref 8–22)
CALCIUM SERPL-MCNC: 8.7 MG/DL (ref 8.5–10.5)
CHLORIDE SERPL-SCNC: 102 MMOL/L (ref 96–112)
CO2 SERPL-SCNC: 27 MMOL/L (ref 20–33)
CREAT SERPL-MCNC: 0.9 MG/DL (ref 0.5–1.4)
GLUCOSE BLD-MCNC: 94 MG/DL (ref 65–99)
GLUCOSE SERPL-MCNC: 112 MG/DL (ref 65–99)
POTASSIUM SERPL-SCNC: 4.4 MMOL/L (ref 3.6–5.5)
SODIUM SERPL-SCNC: 136 MMOL/L (ref 135–145)

## 2018-07-21 PROCEDURE — 80048 BASIC METABOLIC PNL TOTAL CA: CPT

## 2018-07-21 PROCEDURE — 700112 HCHG RX REV CODE 229: Performed by: CLINICAL NURSE SPECIALIST

## 2018-07-21 PROCEDURE — 82962 GLUCOSE BLOOD TEST: CPT

## 2018-07-21 PROCEDURE — A9270 NON-COVERED ITEM OR SERVICE: HCPCS | Performed by: CLINICAL NURSE SPECIALIST

## 2018-07-21 PROCEDURE — 36415 COLL VENOUS BLD VENIPUNCTURE: CPT

## 2018-07-21 PROCEDURE — 700102 HCHG RX REV CODE 250 W/ 637 OVERRIDE(OP): Performed by: CLINICAL NURSE SPECIALIST

## 2018-07-21 PROCEDURE — 700101 HCHG RX REV CODE 250: Performed by: NURSE PRACTITIONER

## 2018-07-21 PROCEDURE — G0378 HOSPITAL OBSERVATION PER HR: HCPCS

## 2018-07-21 PROCEDURE — 96372 THER/PROPH/DIAG INJ SC/IM: CPT

## 2018-07-21 PROCEDURE — 700111 HCHG RX REV CODE 636 W/ 250 OVERRIDE (IP): Performed by: NURSE PRACTITIONER

## 2018-07-21 RX ADMIN — CHOLECALCIFEROL TAB 25 MCG (1000 UNIT) 2000 UNITS: 25 TAB at 04:47

## 2018-07-21 RX ADMIN — STANDARDIZED SENNA CONCENTRATE AND DOCUSATE SODIUM 1 TABLET: 8.6; 5 TABLET, FILM COATED ORAL at 18:21

## 2018-07-21 RX ADMIN — TIZANIDINE 2 MG: 4 TABLET ORAL at 04:47

## 2018-07-21 RX ADMIN — DILTIAZEM HYDROCHLORIDE 240 MG: 240 CAPSULE, EXTENDED RELEASE ORAL at 04:48

## 2018-07-21 RX ADMIN — ANTACID TABLETS 500 MG: 500 TABLET, CHEWABLE ORAL at 04:47

## 2018-07-21 RX ADMIN — ANTACID TABLETS 500 MG: 500 TABLET, CHEWABLE ORAL at 18:21

## 2018-07-21 RX ADMIN — HYDROCHLOROTHIAZIDE 25 MG: 25 TABLET ORAL at 04:47

## 2018-07-21 RX ADMIN — TIZANIDINE 2 MG: 4 TABLET ORAL at 12:48

## 2018-07-21 RX ADMIN — GABAPENTIN 600 MG: 300 CAPSULE ORAL at 18:21

## 2018-07-21 RX ADMIN — ENOXAPARIN SODIUM 40 MG: 100 INJECTION SUBCUTANEOUS at 21:07

## 2018-07-21 RX ADMIN — OXYCODONE HYDROCHLORIDE 20 MG: 10 TABLET ORAL at 12:48

## 2018-07-21 RX ADMIN — GABAPENTIN 600 MG: 300 CAPSULE ORAL at 12:48

## 2018-07-21 RX ADMIN — POLYVINYL ALCOHOL 1 DROP: 14 SOLUTION/ DROPS OPHTHALMIC at 04:50

## 2018-07-21 RX ADMIN — LISINOPRIL 20 MG: 20 TABLET ORAL at 04:47

## 2018-07-21 RX ADMIN — DOCUSATE SODIUM 100 MG: 100 CAPSULE, LIQUID FILLED ORAL at 18:21

## 2018-07-21 RX ADMIN — Medication 1 EACH: at 04:50

## 2018-07-21 RX ADMIN — POLYVINYL ALCOHOL 1 DROP: 14 SOLUTION/ DROPS OPHTHALMIC at 18:00

## 2018-07-21 RX ADMIN — OXYCODONE HYDROCHLORIDE 20 MG: 10 TABLET ORAL at 04:46

## 2018-07-21 RX ADMIN — Medication 1 EACH: at 18:22

## 2018-07-21 RX ADMIN — PREGABALIN 300 MG: 150 CAPSULE ORAL at 04:47

## 2018-07-21 RX ADMIN — GABAPENTIN 600 MG: 300 CAPSULE ORAL at 04:47

## 2018-07-21 RX ADMIN — DOCUSATE SODIUM 100 MG: 100 CAPSULE, LIQUID FILLED ORAL at 04:47

## 2018-07-21 ASSESSMENT — PAIN SCALES - GENERAL
PAINLEVEL_OUTOF10: 0
PAINLEVEL_OUTOF10: 4
PAINLEVEL_OUTOF10: 8
PAINLEVEL_OUTOF10: 3
PAINLEVEL_OUTOF10: 4
PAINLEVEL_OUTOF10: 6

## 2018-07-21 NOTE — DISCHARGE PLANNING
Rehab auth remains pending today with Medicaid. Do not now anticipate a determination until Monday at the earliest. Will continue to monitor for determination.

## 2018-07-21 NOTE — PROGRESS NOTES
Pt is A&Ox4.  Moves upper extremities 5/5, right leg 3/5 and left leg 2/5.  Complains of numbness in bilateral feet.  Up with 1 person assist, shuffling gait.  Pt complains of 8/10 pain.  Oxycodone given PRN.  Pt denies nausea/vomiting.  Tolerating regular diet.  Pt voiding with frequency.  Surgical incision CDI, foam dressings placed over skin tears on back.  POC discussed, pt verbalizes understanding.  Bed alarm on.  Call light within reach, bed in lowest position.

## 2018-07-21 NOTE — CARE PLAN
Problem: Fluid Volume:  Goal: Will maintain balanced intake and output  Outcome: PROGRESSING AS EXPECTED  Pt's fluids intake/output observed, adequate intake provided. Patient educated about importance of adequate fluid intake, verbalized understanding. Pt is voiding regularly, no urine retention noticed. No edema, no signs of dehydration observed.

## 2018-07-21 NOTE — DISCHARGE PLANNING
Anticipated Discharge Disposition: [facility]    Action: Patient medically clear. Still awaiting insurance auth for [facility].     Barriers to Discharge: Pending insurance auth    Plan: f/u with [facility] TCN's for insurance authorization.

## 2018-07-21 NOTE — PROGRESS NOTES
Pt alert and oriented X4, pleasant and cooperative. O2Sat remains over 90% on 2L O2 via NC. New SCDsa provided, TEDs ordered. RN contacted Dr. Perez office regarding pt's request for foot drop brace for her left foot. Per MD on call will wait till Monday to assess pt's foot. Pt denies any pain at this time. Bed alarm is on, call light and personal belongings within reach.

## 2018-07-21 NOTE — PROGRESS NOTES
Pt is A&Ox4, O2Sat remains over 90% on 2L O2 via nasal cannula. Ambulates to the bathroom with 4-wheels walker, standby assist. Using call light appropriately. Pain remains at 6/10 after medicated per MAR. New skin tears noted on pt's back, assessed by physician. Order for wound care evaluation placed.Pt voiding to the toilet with no difficulty. No BM since last night. Call light within reach, bed alarm's on.

## 2018-07-21 NOTE — WOUND TEAM
Wound Team consulted for skin tear on back.  Orders in place by Whitney REHMAN for neosporin to wound tears bid prn.  Wound team signing off for now.

## 2018-07-21 NOTE — CARE PLAN
Problem: Pain Management  Goal: Pain level will decrease to patient's comfort goal  Patient calm and relaxed, states that has no pain at this time.

## 2018-07-21 NOTE — PROGRESS NOTES
Neurosurgery Progress Note     Subjective:  Doing well  c/o soreness to back, no leg pain, n/t from knees down unchanged    Exam:  bilat IP/quad 5/5, left PF 4-/5, right PF 5-/5 bilat EHL 3/5, right DF 3+/5, left DF 1-2-- overall improved from pre-op    BP  Min: 91/59  Max: 108/56  Pulse  Av.3  Min: 65  Max: 94  Resp  Av.8  Min: 16  Max: 18  Temp  Av.2 °C (98.9 °F)  Min: 37 °C (98.6 °F)  Max: 37.5 °C (99.5 °F)  SpO2  Av.3 %  Min: 92 %  Max: 94 %    No Data Recorded    Recent Labs      18   05   WBC  11.5*  10.6   RBC  3.57*  3.37*   HEMOGLOBIN  10.9*  10.4*   HEMATOCRIT  32.2*  30.9*   MCV  90.2  91.7   MCH  30.5  30.9   MCHC  33.9  33.7   RDW  43.2  44.1   PLATELETCT  183  185   MPV  11.3  11.2     Recent Labs      18   01318   0517  18   0023   SODIUM  131*  130*  136   POTASSIUM  3.9  4.0  4.4   CHLORIDE  96  97  102   CO2  27  27  27   GLUCOSE  113*  115*  112*   BUN  26*  37*  21   CREATININE  1.39  1.81*  0.90   CALCIUM  8.5  8.4*  8.7               Intake/Output       18 - 18 - 18 0659      1900-0659 Total  Total       Intake    P.O.  --  -- --  1500  -- 1500    P.O. -- -- -- 1500 -- 1500    I.V.  --  -- --  500  -- 500    IV Volume (NS 20 K) -- -- -- 500 -- 500    Total Intake -- -- -- 2000 -- 2000       Output    Urine  1750  2150 3900  --  -- --    Number of Times Voided 7 x 8 x 15 x -- -- --    Urine Void (mL) (non-catheter) 1750  3900 -- -- --    Stool  --  -- --  --  -- --    Number of Times Stooled 2 x -- 2 x -- -- --    Total Output 1752150 3900 -- -- --       Net I/O     -175 - -3900 --             Intake/Output Summary (Last 24 hours) at 18 0900  Last data filed at 18 0700   Gross per 24 hour   Intake             2000 ml   Output             3700 ml   Net            -1700 ml       $ Bladder Scan Results (mL): 218    • enoxaparin  (LOVENOX) injection  40 mg QHS   • bacitracin-polymyxin b   BID   • oxyCODONE immediate-release  10-20 mg Q4HRS PRN   • vitamin D  2,000 Units DAILY   • gabapentin  600 mg TID   • hydroCHLOROthiazide  25 mg DAILY   • lisinopril  20 mg DAILY   • pregabalin  300 mg DAILY   • Pharmacy Consult Request  1 Each PRN   • MD ALERT...Do not administer NSAIDS or ASPIRIN unless ORDERED By Neurosurgery  1 Each PRN   • docusate sodium  100 mg BID   • senna-docusate  1 Tab Nightly   • senna-docusate  1 Tab Q24HRS PRN   • polyethylene glycol/lytes  1 Packet BID PRN   • magnesium hydroxide  30 mL QDAY PRN   • bisacodyl  10 mg Q24HRS PRN   • fleet  1 Each Once PRN   • 0.9 % NaCl with KCl 20 mEq 1,000 mL   Continuous   • diphenhydrAMINE  25 mg Q6HRS PRN    Or   • diphenhydrAMINE  25 mg Q6HRS PRN   • ondansetron  4 mg Q4HRS PRN   • ondansetron  4 mg Q4HRS PRN   • promethazine  12.5-25 mg Q4HRS PRN   • promethazine  12.5-25 mg Q4HRS PRN   • prochlorperazine  5-10 mg Q4HRS PRN   • tizanidine  2 mg TID PRN   • labetalol  10 mg Q HOUR PRN   • cloNIDine  0.1 mg Q4HRS PRN   • benzocaine-menthol  1 Lozenge Q2HRS PRN   • calcium carbonate  500 mg BID   • DILTIAZem CD  240 mg Q DAY   • artificial tears  1 Drop BID       Assessment and Plan:  POD #4 L2-5 lami/posterolat fusion  Prophylactic anticoagulation: yes      Start date/time: tonight     Plan:  Pt/ot/mobilize  Hold bp meds for sbp <110  Hyponatremia - monitor  Rehab when accepted

## 2018-07-22 PROCEDURE — 700111 HCHG RX REV CODE 636 W/ 250 OVERRIDE (IP): Performed by: NURSE PRACTITIONER

## 2018-07-22 PROCEDURE — A9270 NON-COVERED ITEM OR SERVICE: HCPCS | Performed by: CLINICAL NURSE SPECIALIST

## 2018-07-22 PROCEDURE — 700112 HCHG RX REV CODE 229: Performed by: CLINICAL NURSE SPECIALIST

## 2018-07-22 PROCEDURE — G0378 HOSPITAL OBSERVATION PER HR: HCPCS

## 2018-07-22 PROCEDURE — 700101 HCHG RX REV CODE 250: Performed by: NURSE PRACTITIONER

## 2018-07-22 PROCEDURE — 700102 HCHG RX REV CODE 250 W/ 637 OVERRIDE(OP): Performed by: CLINICAL NURSE SPECIALIST

## 2018-07-22 PROCEDURE — 96372 THER/PROPH/DIAG INJ SC/IM: CPT

## 2018-07-22 RX ADMIN — OXYCODONE HYDROCHLORIDE 20 MG: 10 TABLET ORAL at 01:53

## 2018-07-22 RX ADMIN — ANTACID TABLETS 500 MG: 500 TABLET, CHEWABLE ORAL at 06:33

## 2018-07-22 RX ADMIN — DOCUSATE SODIUM 100 MG: 100 CAPSULE, LIQUID FILLED ORAL at 17:21

## 2018-07-22 RX ADMIN — HYDROCHLOROTHIAZIDE 25 MG: 25 TABLET ORAL at 06:33

## 2018-07-22 RX ADMIN — OXYCODONE HYDROCHLORIDE 20 MG: 10 TABLET ORAL at 13:44

## 2018-07-22 RX ADMIN — POLYVINYL ALCOHOL 1 DROP: 14 SOLUTION/ DROPS OPHTHALMIC at 06:00

## 2018-07-22 RX ADMIN — OXYCODONE HYDROCHLORIDE 20 MG: 10 TABLET ORAL at 21:13

## 2018-07-22 RX ADMIN — STANDARDIZED SENNA CONCENTRATE AND DOCUSATE SODIUM 1 TABLET: 8.6; 5 TABLET, FILM COATED ORAL at 21:13

## 2018-07-22 RX ADMIN — DOCUSATE SODIUM 100 MG: 100 CAPSULE, LIQUID FILLED ORAL at 06:33

## 2018-07-22 RX ADMIN — Medication 1 EACH: at 06:33

## 2018-07-22 RX ADMIN — OXYCODONE HYDROCHLORIDE 20 MG: 10 TABLET ORAL at 08:25

## 2018-07-22 RX ADMIN — POLYETHYLENE GLYCOL 3350 1 PACKET: 17 POWDER, FOR SOLUTION ORAL at 08:34

## 2018-07-22 RX ADMIN — ENOXAPARIN SODIUM 40 MG: 100 INJECTION SUBCUTANEOUS at 21:13

## 2018-07-22 RX ADMIN — GABAPENTIN 600 MG: 300 CAPSULE ORAL at 06:32

## 2018-07-22 RX ADMIN — Medication 1 EACH: at 17:21

## 2018-07-22 RX ADMIN — PREGABALIN 300 MG: 150 CAPSULE ORAL at 08:25

## 2018-07-22 RX ADMIN — ANTACID TABLETS 500 MG: 500 TABLET, CHEWABLE ORAL at 17:20

## 2018-07-22 RX ADMIN — LISINOPRIL 20 MG: 20 TABLET ORAL at 06:33

## 2018-07-22 RX ADMIN — GABAPENTIN 600 MG: 300 CAPSULE ORAL at 17:20

## 2018-07-22 RX ADMIN — GABAPENTIN 600 MG: 300 CAPSULE ORAL at 13:45

## 2018-07-22 RX ADMIN — MAGNESIUM HYDROXIDE 30 ML: 400 SUSPENSION ORAL at 08:34

## 2018-07-22 RX ADMIN — CHOLECALCIFEROL TAB 25 MCG (1000 UNIT) 2000 UNITS: 25 TAB at 06:32

## 2018-07-22 RX ADMIN — DILTIAZEM HYDROCHLORIDE 240 MG: 240 CAPSULE, EXTENDED RELEASE ORAL at 08:25

## 2018-07-22 RX ADMIN — TIZANIDINE 2 MG: 4 TABLET ORAL at 17:20

## 2018-07-22 ASSESSMENT — PAIN SCALES - GENERAL
PAINLEVEL_OUTOF10: 8
PAINLEVEL_OUTOF10: 7
PAINLEVEL_OUTOF10: 3

## 2018-07-22 NOTE — PROGRESS NOTES
Pt rec'd, resting in bed, no complaints, no distress. Bed in low/locked position and call bell in reach. Pt  Instructed to call to get up to commode.

## 2018-07-22 NOTE — PROGRESS NOTES
Neurosurgery Progress Note     Subjective:  Doing well  c/o soreness to back, no leg pain, n/t from knees down unchanged  Rehab pending insurance authorization,  anticipates authorization on Monday    Exam:  bilat IP/quad 5/5, left PF 4-/5, right PF 5-/5 bilat EHL 3/5, right DF 3/5, left DF 1-2-- overall improved from pre-op    BP  Min: 89/35  Max: 122/57  Pulse  Av.7  Min: 55  Max: 76  Resp  Av  Min: 16  Max: 16  Temp  Av.9 °C (98.5 °F)  Min: 36.9 °C (98.4 °F)  Max: 37.1 °C (98.7 °F)  SpO2  Av %  Min: 92 %  Max: 95 %    No Data Recorded    Recent Labs      18   0517   WBC  10.6   RBC  3.37*   HEMOGLOBIN  10.4*   HEMATOCRIT  30.9*   MCV  91.7   MCH  30.9   MCHC  33.7   RDW  44.1   PLATELETCT  185   MPV  11.2     Recent Labs      18   0517  18   0023   SODIUM  130*  136   POTASSIUM  4.0  4.4   CHLORIDE  97  102   CO2  27  27   GLUCOSE  115*  112*   BUN  37*  21   CREATININE  1.81*  0.90   CALCIUM  8.4*  8.7               Intake/Output       18 07 - 18 0659 18 - 18 0659       Total  Total       Intake    P.O.  1500  -- 1500  --  -- --    P.O. 1500 -- 1500 -- -- --    I.V.  500  -- 500  --  -- --    IV Volume (NS 20 K) 500 -- 500 -- -- --    Total Intake 2000 --  -- -- --       Output    Urine  600  -- 600  --  -- --    Number of Times Voided 2 x 4 x 6 x -- -- --    Urine Void (mL) (non-catheter) 600 -- 600 -- -- --    Stool  --  -- --  --  -- --    Number of Times Stooled 1 x 1 x 2 x -- -- --    Total Output 600 -- 600 -- -- --       Net I/O     1400 -- 1400 -- -- --            Intake/Output Summary (Last 24 hours) at 18 1356  Last data filed at 18 1247   Gross per 24 hour   Intake                0 ml   Output              300 ml   Net             -300 ml            • enoxaparin (LOVENOX) injection  40 mg QHS   • bacitracin-polymyxin b   BID   • oxyCODONE immediate-release  10-20 mg  Q4HRS PRN   • vitamin D  2,000 Units DAILY   • gabapentin  600 mg TID   • hydroCHLOROthiazide  25 mg DAILY   • lisinopril  20 mg DAILY   • pregabalin  300 mg DAILY   • Pharmacy Consult Request  1 Each PRN   • MD ACHARYA...Do not administer NSAIDS or ASPIRIN unless ORDERED By Neurosurgery  1 Each PRN   • docusate sodium  100 mg BID   • senna-docusate  1 Tab Nightly   • senna-docusate  1 Tab Q24HRS PRN   • polyethylene glycol/lytes  1 Packet BID PRN   • magnesium hydroxide  30 mL QDAY PRN   • bisacodyl  10 mg Q24HRS PRN   • fleet  1 Each Once PRN   • 0.9 % NaCl with KCl 20 mEq 1,000 mL   Continuous   • diphenhydrAMINE  25 mg Q6HRS PRN    Or   • diphenhydrAMINE  25 mg Q6HRS PRN   • ondansetron  4 mg Q4HRS PRN   • ondansetron  4 mg Q4HRS PRN   • promethazine  12.5-25 mg Q4HRS PRN   • promethazine  12.5-25 mg Q4HRS PRN   • prochlorperazine  5-10 mg Q4HRS PRN   • tizanidine  2 mg TID PRN   • labetalol  10 mg Q HOUR PRN   • cloNIDine  0.1 mg Q4HRS PRN   • benzocaine-menthol  1 Lozenge Q2HRS PRN   • calcium carbonate  500 mg BID   • DILTIAZem CD  240 mg Q DAY   • artificial tears  1 Drop BID       Assessment and Plan:  POD #5 L2-5 lami/posterolat fusion  Prophylactic anticoagulation: yes      Start date/time: 7/22/18    Plan:  Pt/ot/mobilize  Hold bp meds for sbp <110  Hyponatremia - na 136 7/21 - appears stable  Rehab pending insurance authorization

## 2018-07-22 NOTE — PROGRESS NOTES
SHIFT NOTE    Plan of Care: Patient educated on current plan of care including early mobilization, pain control, safety education and discharge plan. All questions answered and patient notified that staff is available for any further questions.    Communication: This patient has been able to make needs known, and has been cooperative with the current plan of care.    Safety: Patient has been appropriate and compliant with call light to call for assistance. Interventions including no slip socks, bed in low position and locked, belongings within reach and hourly rounding implemented this shift. Bed alarm on and operational for additional safety.    Pain Control: Pain has been well controlled with current interventions, including PO medications and repositioning. Patient has been able to participate in care and rest comfortably following interventions.     Mobility: Patient able to participate with PT/OT: n/a  Highest level of activity achieved: OOB to chair  Assistance: SBA  Equipment required: walker  Distance ambulated: 20  Number of times: 3    Skin: Skin check completed. See flow sheet documentation for complete assessment. Interventions including dressing change and frequent repositioning have been implemented to prevent breakdown.    VTE: Patient has been compliant with ordered interventions, including YENI hose and SCDs. Patient educated on rational for interventions.    Significant Events: Spoke with PA about weak dorsiflexion on assessment - not new and no need for intervention at this time. Pt also reports this is not new.    Rash and slight edema around incision site noted - examined with charge RN. Appears to be patient anatomy vs skin irritation due to tape (skin tears from tape removal, MD and wound care aware). Will pass on to evening shift and continue to monitor. No evidence of infection or hematoma noted.

## 2018-07-23 ENCOUNTER — HOSPITAL ENCOUNTER (INPATIENT)
Facility: REHABILITATION | Age: 53
LOS: 18 days | DRG: 560 | End: 2018-08-10
Attending: PHYSICAL MEDICINE & REHABILITATION | Admitting: PHYSICAL MEDICINE & REHABILITATION
Payer: MEDICAID

## 2018-07-23 ENCOUNTER — RESOLUTE PROFESSIONAL BILLING HOSPITAL PROF FEE (OUTPATIENT)
Dept: PHYSICAL MEDICINE AND REHAB | Facility: REHABILITATION | Age: 53
End: 2018-07-23
Payer: MEDICAID

## 2018-07-23 VITALS
HEART RATE: 97 BPM | SYSTOLIC BLOOD PRESSURE: 121 MMHG | BODY MASS INDEX: 34.09 KG/M2 | HEIGHT: 65 IN | TEMPERATURE: 98.1 F | DIASTOLIC BLOOD PRESSURE: 75 MMHG | OXYGEN SATURATION: 92 % | RESPIRATION RATE: 17 BRPM | WEIGHT: 204.59 LBS

## 2018-07-23 DIAGNOSIS — M48.062 LUMBAR STENOSIS WITH NEUROGENIC CLAUDICATION: ICD-10-CM

## 2018-07-23 PROCEDURE — A9270 NON-COVERED ITEM OR SERVICE: HCPCS | Performed by: CLINICAL NURSE SPECIALIST

## 2018-07-23 PROCEDURE — 97535 SELF CARE MNGMENT TRAINING: CPT

## 2018-07-23 PROCEDURE — 700102 HCHG RX REV CODE 250 W/ 637 OVERRIDE(OP): Performed by: PHYSICAL MEDICINE & REHABILITATION

## 2018-07-23 PROCEDURE — A9270 NON-COVERED ITEM OR SERVICE: HCPCS | Performed by: PHYSICAL MEDICINE & REHABILITATION

## 2018-07-23 PROCEDURE — 97530 THERAPEUTIC ACTIVITIES: CPT

## 2018-07-23 PROCEDURE — 700111 HCHG RX REV CODE 636 W/ 250 OVERRIDE (IP): Performed by: PHYSICAL MEDICINE & REHABILITATION

## 2018-07-23 PROCEDURE — 700102 HCHG RX REV CODE 250 W/ 637 OVERRIDE(OP): Performed by: CLINICAL NURSE SPECIALIST

## 2018-07-23 PROCEDURE — 770010 HCHG ROOM/CARE - REHAB SEMI PRIVAT*

## 2018-07-23 PROCEDURE — G0378 HOSPITAL OBSERVATION PER HR: HCPCS

## 2018-07-23 PROCEDURE — 97116 GAIT TRAINING THERAPY: CPT

## 2018-07-23 PROCEDURE — 700101 HCHG RX REV CODE 250: Performed by: NURSE PRACTITIONER

## 2018-07-23 PROCEDURE — 97110 THERAPEUTIC EXERCISES: CPT

## 2018-07-23 PROCEDURE — 700101 HCHG RX REV CODE 250: Performed by: PHYSICAL MEDICINE & REHABILITATION

## 2018-07-23 PROCEDURE — 700112 HCHG RX REV CODE 229: Performed by: CLINICAL NURSE SPECIALIST

## 2018-07-23 RX ORDER — TIZANIDINE 4 MG/1
2 TABLET ORAL 3 TIMES DAILY PRN
Status: DISCONTINUED | OUTPATIENT
Start: 2018-07-23 | End: 2018-08-10 | Stop reason: HOSPADM

## 2018-07-23 RX ORDER — CALCIUM CARBONATE 500 MG/1
500 TABLET, CHEWABLE ORAL 2 TIMES DAILY
Status: DISCONTINUED | OUTPATIENT
Start: 2018-07-23 | End: 2018-08-10 | Stop reason: HOSPADM

## 2018-07-23 RX ORDER — LISINOPRIL 20 MG/1
20 TABLET ORAL DAILY
Status: CANCELLED | OUTPATIENT
Start: 2018-07-24

## 2018-07-23 RX ORDER — LISINOPRIL 20 MG/1
20 TABLET ORAL DAILY
Status: DISCONTINUED | OUTPATIENT
Start: 2018-07-24 | End: 2018-08-08

## 2018-07-23 RX ORDER — DOCUSATE SODIUM 100 MG/1
100 CAPSULE, LIQUID FILLED ORAL 2 TIMES DAILY
Status: CANCELLED | OUTPATIENT
Start: 2018-07-23

## 2018-07-23 RX ORDER — DILTIAZEM HYDROCHLORIDE 120 MG/1
240 CAPSULE, COATED, EXTENDED RELEASE ORAL
Status: DISCONTINUED | OUTPATIENT
Start: 2018-07-24 | End: 2018-07-24

## 2018-07-23 RX ORDER — POLYVINYL ALCOHOL 14 MG/ML
1 SOLUTION/ DROPS OPHTHALMIC PRN
Status: DISCONTINUED | OUTPATIENT
Start: 2018-07-23 | End: 2018-08-10 | Stop reason: HOSPADM

## 2018-07-23 RX ORDER — HYDROCHLOROTHIAZIDE 25 MG/1
25 TABLET ORAL DAILY
Status: DISCONTINUED | OUTPATIENT
Start: 2018-07-24 | End: 2018-08-07

## 2018-07-23 RX ORDER — HYDROCHLOROTHIAZIDE 25 MG/1
25 TABLET ORAL DAILY
Status: CANCELLED | OUTPATIENT
Start: 2018-07-24

## 2018-07-23 RX ORDER — DOCUSATE SODIUM 100 MG/1
100 CAPSULE, LIQUID FILLED ORAL 2 TIMES DAILY
Status: DISCONTINUED | OUTPATIENT
Start: 2018-07-23 | End: 2018-08-07

## 2018-07-23 RX ORDER — BACITRACIN ZINC AND POLYMYXIN B SULFATE 500; 1000 [USP'U]/G; [USP'U]/G
OINTMENT TOPICAL 2 TIMES DAILY
Status: CANCELLED | OUTPATIENT
Start: 2018-07-23

## 2018-07-23 RX ORDER — BACITRACIN ZINC AND POLYMYXIN B SULFATE 500; 1000 [USP'U]/G; [USP'U]/G
OINTMENT TOPICAL 2 TIMES DAILY
Status: DISCONTINUED | OUTPATIENT
Start: 2018-07-23 | End: 2018-07-30

## 2018-07-23 RX ORDER — GABAPENTIN 300 MG/1
600 CAPSULE ORAL 3 TIMES DAILY
Status: CANCELLED | OUTPATIENT
Start: 2018-07-23

## 2018-07-23 RX ORDER — TIZANIDINE 4 MG/1
2 TABLET ORAL 3 TIMES DAILY PRN
Status: CANCELLED | OUTPATIENT
Start: 2018-07-23

## 2018-07-23 RX ORDER — ECHINACEA PURPUREA EXTRACT 125 MG
2 TABLET ORAL PRN
Status: DISCONTINUED | OUTPATIENT
Start: 2018-07-23 | End: 2018-08-10 | Stop reason: HOSPADM

## 2018-07-23 RX ORDER — BISACODYL 10 MG
10 SUPPOSITORY, RECTAL RECTAL
Status: DISCONTINUED | OUTPATIENT
Start: 2018-07-23 | End: 2018-07-24

## 2018-07-23 RX ORDER — CALCIUM CARBONATE 500 MG/1
500 TABLET, CHEWABLE ORAL 2 TIMES DAILY
Status: CANCELLED | OUTPATIENT
Start: 2018-07-23

## 2018-07-23 RX ORDER — DILTIAZEM HYDROCHLORIDE 240 MG/1
240 CAPSULE, COATED, EXTENDED RELEASE ORAL
Status: CANCELLED | OUTPATIENT
Start: 2018-07-24

## 2018-07-23 RX ORDER — GABAPENTIN 300 MG/1
600 CAPSULE ORAL 3 TIMES DAILY
Status: DISCONTINUED | OUTPATIENT
Start: 2018-07-23 | End: 2018-07-31

## 2018-07-23 RX ORDER — HYDRALAZINE HYDROCHLORIDE 25 MG/1
25 TABLET, FILM COATED ORAL EVERY 8 HOURS PRN
Status: DISCONTINUED | OUTPATIENT
Start: 2018-07-23 | End: 2018-08-10 | Stop reason: HOSPADM

## 2018-07-23 RX ORDER — PREGABALIN 100 MG/1
300 CAPSULE ORAL DAILY
Status: DISCONTINUED | OUTPATIENT
Start: 2018-07-24 | End: 2018-08-10 | Stop reason: HOSPADM

## 2018-07-23 RX ORDER — ONDANSETRON 2 MG/ML
4 INJECTION INTRAMUSCULAR; INTRAVENOUS 4 TIMES DAILY PRN
Status: DISCONTINUED | OUTPATIENT
Start: 2018-07-23 | End: 2018-08-10 | Stop reason: HOSPADM

## 2018-07-23 RX ORDER — ONDANSETRON 4 MG/1
4 TABLET, ORALLY DISINTEGRATING ORAL 4 TIMES DAILY PRN
Status: DISCONTINUED | OUTPATIENT
Start: 2018-07-23 | End: 2018-08-10 | Stop reason: HOSPADM

## 2018-07-23 RX ORDER — POLYETHYLENE GLYCOL 3350 17 G/17G
1 POWDER, FOR SOLUTION ORAL
Status: DISCONTINUED | OUTPATIENT
Start: 2018-07-23 | End: 2018-07-24

## 2018-07-23 RX ORDER — OXYCODONE HYDROCHLORIDE 5 MG/1
15 TABLET ORAL
Status: DISCONTINUED | OUTPATIENT
Start: 2018-07-23 | End: 2018-08-01

## 2018-07-23 RX ORDER — ALUMINA, MAGNESIA, AND SIMETHICONE 2400; 2400; 240 MG/30ML; MG/30ML; MG/30ML
20 SUSPENSION ORAL
Status: DISCONTINUED | OUTPATIENT
Start: 2018-07-23 | End: 2018-08-10 | Stop reason: HOSPADM

## 2018-07-23 RX ORDER — OXYCODONE HYDROCHLORIDE 10 MG/1
10 TABLET ORAL
Status: DISCONTINUED | OUTPATIENT
Start: 2018-07-23 | End: 2018-08-01

## 2018-07-23 RX ORDER — PREGABALIN 150 MG/1
300 CAPSULE ORAL DAILY
Status: CANCELLED | OUTPATIENT
Start: 2018-07-24

## 2018-07-23 RX ORDER — TRAZODONE HYDROCHLORIDE 50 MG/1
50 TABLET ORAL
Status: DISCONTINUED | OUTPATIENT
Start: 2018-07-23 | End: 2018-08-10 | Stop reason: HOSPADM

## 2018-07-23 RX ORDER — ACETAMINOPHEN 325 MG/1
650 TABLET ORAL EVERY 4 HOURS PRN
Status: DISCONTINUED | OUTPATIENT
Start: 2018-07-23 | End: 2018-08-10 | Stop reason: HOSPADM

## 2018-07-23 RX ORDER — AMOXICILLIN 250 MG
2 CAPSULE ORAL 2 TIMES DAILY
Status: DISCONTINUED | OUTPATIENT
Start: 2018-07-23 | End: 2018-07-24

## 2018-07-23 RX ADMIN — BACITRACIN ZINC AND POLYMYXIN B SULFATE 1 EACH: at 19:58

## 2018-07-23 RX ADMIN — ENOXAPARIN SODIUM 40 MG: 100 INJECTION SUBCUTANEOUS at 19:55

## 2018-07-23 RX ADMIN — OXYCODONE HYDROCHLORIDE 20 MG: 10 TABLET ORAL at 15:08

## 2018-07-23 RX ADMIN — OXYCODONE HYDROCHLORIDE 10 MG: 10 TABLET ORAL at 19:54

## 2018-07-23 RX ADMIN — GABAPENTIN 600 MG: 300 CAPSULE ORAL at 19:54

## 2018-07-23 RX ADMIN — LISINOPRIL 20 MG: 20 TABLET ORAL at 04:29

## 2018-07-23 RX ADMIN — OXYCODONE HYDROCHLORIDE 20 MG: 10 TABLET ORAL at 10:11

## 2018-07-23 RX ADMIN — OXYCODONE HYDROCHLORIDE 10 MG: 10 TABLET ORAL at 04:29

## 2018-07-23 RX ADMIN — Medication 1 EACH: at 04:26

## 2018-07-23 RX ADMIN — HYDROCHLOROTHIAZIDE 25 MG: 25 TABLET ORAL at 04:28

## 2018-07-23 RX ADMIN — DOCUSATE SODIUM 100 MG: 100 CAPSULE, LIQUID FILLED ORAL at 04:28

## 2018-07-23 RX ADMIN — OXYCODONE HYDROCHLORIDE 10 MG: 10 TABLET ORAL at 23:02

## 2018-07-23 RX ADMIN — PREGABALIN 300 MG: 150 CAPSULE ORAL at 04:28

## 2018-07-23 RX ADMIN — OXYCODONE HYDROCHLORIDE 10 MG: 10 TABLET ORAL at 04:26

## 2018-07-23 RX ADMIN — ANTACID TABLETS 500 MG: 500 TABLET, CHEWABLE ORAL at 04:28

## 2018-07-23 RX ADMIN — CALCIUM CARBONATE (ANTACID) CHEW TAB 500 MG 500 MG: 500 CHEW TAB at 19:54

## 2018-07-23 RX ADMIN — POLYVINYL ALCOHOL 1 DROP: 14 SOLUTION/ DROPS OPHTHALMIC at 06:00

## 2018-07-23 RX ADMIN — DILTIAZEM HYDROCHLORIDE 240 MG: 240 CAPSULE, EXTENDED RELEASE ORAL at 04:26

## 2018-07-23 RX ADMIN — CHOLECALCIFEROL TAB 25 MCG (1000 UNIT) 2000 UNITS: 25 TAB at 04:28

## 2018-07-23 RX ADMIN — GABAPENTIN 600 MG: 300 CAPSULE ORAL at 04:26

## 2018-07-23 RX ADMIN — GABAPENTIN 600 MG: 300 CAPSULE ORAL at 15:08

## 2018-07-23 ASSESSMENT — COGNITIVE AND FUNCTIONAL STATUS - GENERAL
DRESSING REGULAR UPPER BODY CLOTHING: A LITTLE
DRESSING REGULAR LOWER BODY CLOTHING: A LOT
STANDING UP FROM CHAIR USING ARMS: A LITTLE
MOVING TO AND FROM BED TO CHAIR: A LITTLE
HELP NEEDED FOR BATHING: A LOT
SUGGESTED CMS G CODE MODIFIER MOBILITY: CK
TURNING FROM BACK TO SIDE WHILE IN FLAT BAD: A LITTLE
MOVING FROM LYING ON BACK TO SITTING ON SIDE OF FLAT BED: A LITTLE
CLIMB 3 TO 5 STEPS WITH RAILING: A LOT
PERSONAL GROOMING: A LITTLE
TOILETING: A LOT
MOBILITY SCORE: 17
DAILY ACTIVITIY SCORE: 16
WALKING IN HOSPITAL ROOM: A LITTLE
SUGGESTED CMS G CODE MODIFIER DAILY ACTIVITY: CK

## 2018-07-23 ASSESSMENT — PAIN SCALES - GENERAL
PAINLEVEL_OUTOF10: 6
PAINLEVEL_OUTOF10: 7
PAINLEVEL_OUTOF10: 6
PAINLEVEL_OUTOF10: 8
PAINLEVEL_OUTOF10: 9

## 2018-07-23 ASSESSMENT — PATIENT HEALTH QUESTIONNAIRE - PHQ9
2. FEELING DOWN, DEPRESSED, IRRITABLE, OR HOPELESS: NOT AT ALL
SUM OF ALL RESPONSES TO PHQ9 QUESTIONS 1 AND 2: 0
1. LITTLE INTEREST OR PLEASURE IN DOING THINGS: NOT AT ALL

## 2018-07-23 ASSESSMENT — LIFESTYLE VARIABLES: ALCOHOL_USE: NO

## 2018-07-23 ASSESSMENT — GAIT ASSESSMENTS
DEVIATION: STEP TO;DECREASED HEEL STRIKE
ASSISTIVE DEVICE: 4 WHEEL WALKER
GAIT LEVEL OF ASSIST: CONTACT GUARD ASSIST
DISTANCE (FEET): 25

## 2018-07-23 NOTE — DISCHARGE INSTRUCTIONS
Follow up with [facility] at [facility] in 2 weeks [phone]   Follow up with Dr. Perez in 6 weeks   No pushing, pulling, lifting greater than 10 pounds   No repetitive bending, no twisting   Ok to shower, pat incision dry - 24 hours after drain was removed   No non-steroidal anti-inflammatory medications or aspirin until cleared by    Ambulate as much is comfortable   No driving for at least 2 weeks following surgery or until cleared   Obtain over the counter senekot take 1-2 tablets daily while taking narcotic pain medication   Wear LSO when OOB        Spinal Fusion, Care After  Refer to this sheet in the next few weeks. These instructions provide you with information about caring for yourself after your procedure. Your health care provider may also give you more specific instructions. Your treatment has been planned according to current medical practices, but problems sometimes occur. Call your health care provider if you have any problems or questions after your procedure.  WHAT TO EXPECT AFTER THE PROCEDURE  After your procedure, it is common to have:  · Pain and stiffness in your back.  · Pain around your incision.  HOME CARE INSTRUCTIONS   Medicines   · Take over-the-counter and prescription medicines only as told by your health care provider. These include any pain medicines.  · Do not drive for 24 hours if you received a sedative.  · Do not drive or operate heavy machinery while taking prescription pain medicine.  · If you were prescribed an antibiotic medicine, take it as told by your health care provider. Do not stop taking the antibiotic even if you start to feel better.  Incision Care   · Follow instructions from your health care provider about how to take care of your incision. Make sure you:  ¨ Wash your hands with soap and water before you change your bandage (dressing). If soap and water are not available, use hand .  ¨ Change your dressing as told by your health care  provider.  ¨ Leave stitches (sutures), skin glue, or adhesive strips in place. These skin closures may need to be in place for 2 weeks or longer. If adhesive strip edges start to loosen and curl up, you may trim the loose edges. Do not remove adhesive strips completely unless your health care provider tells you to do that.  · Keep your incision clean and dry. Do not take baths, swim, or use a hot tub until your health care provider approves.  · Check your incision and the surrounding area every day for redness, swelling, and leaking fluid.  Physical Activity   · Return to your normal activities as told by your health care provider. Ask your health care provider what activities are safe for you. Rest and protect your back as much as possible.  · Follow instructions from your health care provider about how to move and use good posture to help your spine heal.  · Do not lift anything that is heavier than 8 lb (3.6 kg) or the limit that your health care provider tells you until he or she says that it is safe. Avoid lifting anything over your head.  · Do not twist or bend at the waist until your health care provider approves.  · Avoid pushing and pulling motions.  · Avoid sitting or lying down in the same position for long periods of time.  · Do not begin exercising until told by your health care provider. Ask your health care provider what kinds of exercise you can do to make your back stronger.  General Instructions   · If you were given a brace, use it as told by your health care provider.  · Wear compression stockings as told by your health care provider. These stockings help to prevent blood clots and reduce swelling in your legs.  · Do not use tobacco products, including cigarettes, chewing tobacco, or e-cigarettes. If you need help quitting, ask your health care provider.  · Keep all follow-up visits as told by your health care provider and, if necessary, your physical therapist. This is important.  SEEK MEDICAL  CARE IF:  · You have pain that gets worse or does not get better with medicine.  · Your legs or feet become painful or swollen.  · You have redness, swelling, or pain at the site of your incision.  · You have fluid, blood, or pus coming from your incision.  · You vomit or feel nauseous.  · You have weakness or numbness in your legs that is new or getting worse.  · You have a fever.  · You have trouble controlling urination or bowel movements.  SEEK IMMEDIATE MEDICAL CARE IF:   · You have severe pain.  · You have chest pain.  · You have trouble breathing.  · You develop a cough.  These symptoms may represent a serious problem that is an emergency. Do not wait to see if the symptoms will go away. Get medical help right away. Call your local emergency services (911 in the [place] Do not drive yourself to the hospital.   This information is not intended to replace advice given to you by your health care provider. Make sure you discuss any questions you have with your health care provider.  Document Released: 07/07/2006 Document Revised: 04/10/2017 Document Reviewed: 06/01/2016  [employer] Patient Education © 2017 [employer]      Discharge Instructions    Discharged to rehab by medical transportation with escort. Discharged via wheelchair, hospital escort: Yes.  Special equipment needed: Walker and LSO and left foot brace    Be sure to schedule a follow-up appointment with your primary care doctor or any specialists as instructed.     Discharge Plan:   Influenza Vaccine Indication: Not indicated: Previously immunized this influenza season and > 8 years of age    I understand that a diet low in cholesterol, fat, and sodium is recommended for good health. Unless I have been given specific instructions below for another diet, I accept this instruction as my diet prescription.   Other diet: Regular    Special Instructions: None    · Is patient discharged on Warfarin / Coumadin?   No     Depression / Suicide  Risk    As you are discharged from this [facility] facility, it is important to learn how to keep safe from harming yourself.    Recognize the warning signs:  · Abrupt changes in personality, positive or negative- including increase in energy   · Giving away possessions  · Change in eating patterns- significant weight changes-  positive or negative  · Change in sleeping patterns- unable to sleep or sleeping all the time   · Unwillingness or inability to communicate  · Depression  · Unusual sadness, discouragement and loneliness  · Talk of wanting to die  · Neglect of personal appearance   · Rebelliousness- reckless behavior  · Withdrawal from people/activities they love  · Confusion- inability to concentrate     If you or a loved one observes any of these behaviors or has concerns about self-harm, here's what you can do:  · Talk about it- your feelings and reasons for harming yourself  · Remove any means that you might use to hurt yourself (examples: pills, rope, extension cords, firearm)  · Get professional help from the community (Mental Health, Substance Abuse, psychological counseling)  · Do not be alone:Call your Safe Contact- someone whom you trust who will be there for you.  · Call your local CRISIS HOTLINE 718-8196 or 997-887-1269  · Call your local Children's Mobile Crisis Response Team [place] (619) 050-4097 or www.Getaround  · Call the toll free National Suicide Prevention Hotlines   · National Suicide Prevention Lifeline 710-122-WUVA (2846)  · National Hope Line Network 800-SUICIDE (704-6876)

## 2018-07-23 NOTE — DISCHARGE PLANNING
Anticipated Discharge Disposition: [facility]    Action: LSW was notified that pt has been accepted for admission and Dr. Damon is the admitting provider. COBRA was completed and placed on the chart. LSW alerted [facility] that d/c summary was from 7/20/18. They stated they were okay with that.    Barriers to Discharge: None    Plan: Pt projected to d.c to [facility] at 1630 via [facility]

## 2018-07-23 NOTE — PROGRESS NOTES
Pt rec'd in report at bedside. Pt in no distress, voices no complaints at this time, resting quietly watching tv. Bed in low/locked position, call bell in reach.

## 2018-07-23 NOTE — PROGRESS NOTES
Neurosurgery Progress Note     Subjective:  Pt awake, alert  Pain improving  No rad pain  N/t feet unchanged  Voiding  + bm    Exam:  bilat IP/quad 5/5, GS bilat 4+-5/5 bilat EHL 3/5, right DF 3/5, left DF 1-2  Inc c/d/I with staples, minor skin tears to left side of incision - covered    BP  Min: 104/56  Max: 127/80  Pulse  Av.3  Min: 70  Max: 84  Resp  Av.3  Min: 16  Max: 17  Temp  Av °C (98.6 °F)  Min: 36.4 °C (97.6 °F)  Max: 37.7 °C (99.8 °F)  SpO2  Av %  Min: 92 %  Max: 94 %    No Data Recorded        Recent Labs      18   0023   SODIUM  136   POTASSIUM  4.4   CHLORIDE  102   CO2  27   GLUCOSE  112*   BUN  21   CREATININE  0.90   CALCIUM  8.7               Intake/Output       18 - 18 0659 18 - 18 0659       6188-8757 Total  5234-0328 Total       Intake    Total Intake -- -- -- -- -- --       Output    Urine  --  -- --  --  -- --    Number of Times Voided 4 x 1 x 5 x -- -- --    Stool  --  -- --  --  -- --    Number of Times Stooled -- 1 x 1 x -- -- --    Total Output -- -- -- -- -- --       Net I/O     -- -- -- -- -- --          No intake or output data in the 24 hours ending 18 0848         • enoxaparin (LOVENOX) injection  40 mg QHS   • bacitracin-polymyxin b   BID   • oxyCODONE immediate-release  10-20 mg Q4HRS PRN   • vitamin D  2,000 Units DAILY   • gabapentin  600 mg TID   • hydroCHLOROthiazide  25 mg DAILY   • lisinopril  20 mg DAILY   • pregabalin  300 mg DAILY   • Pharmacy Consult Request  1 Each PRN   • MD ACHARYA...Do not administer NSAIDS or ASPIRIN unless ORDERED By Neurosurgery  1 Each PRN   • docusate sodium  100 mg BID   • senna-docusate  1 Tab Nightly   • senna-docusate  1 Tab Q24HRS PRN   • polyethylene glycol/lytes  1 Packet BID PRN   • magnesium hydroxide  30 mL QDAY PRN   • bisacodyl  10 mg Q24HRS PRN   • fleet  1 Each Once PRN   • 0.9 % NaCl with KCl 20 mEq 1,000 mL   Continuous   • diphenhydrAMINE  25 mg  Q6HRS PRN    Or   • diphenhydrAMINE  25 mg Q6HRS PRN   • ondansetron  4 mg Q4HRS PRN   • ondansetron  4 mg Q4HRS PRN   • promethazine  12.5-25 mg Q4HRS PRN   • promethazine  12.5-25 mg Q4HRS PRN   • prochlorperazine  5-10 mg Q4HRS PRN   • tizanidine  2 mg TID PRN   • labetalol  10 mg Q HOUR PRN   • cloNIDine  0.1 mg Q4HRS PRN   • benzocaine-menthol  1 Lozenge Q2HRS PRN   • calcium carbonate  500 mg BID   • DILTIAZem CD  240 mg Q DAY   • artificial tears  1 Drop BID       Assessment and Plan:  POD #6 L2-5 lami/posterolat fusion  Prophylactic anticoagulation: yes      Start date/time: 7/20/18    Plan:  Pt/ot/mobilize  Hold bp meds for sbp <110  Cont pain regimen  Bowel/bladder protocol   Rehab pending insurance authorization  Ok to dc today once accepted

## 2018-07-23 NOTE — THERAPY
"Occupational Therapy Treatment completed with focus on ADLs, ADL transfers and patient education.  Functional Status:  Pt seen for OT tx. CGA supine > sit, CGA sit > stand, mod A for LB dressing. Tolerated amb w/ 4WW to BR and CGA for balance and safety. Completed toilet transfer w/ raised seat for added height and min A. Max A for pericare d/t inability to complete while maintaining precautions. CGA for standing ADLs at sink level. Min A to return back to supine. Min A for donning LSO and extra time. Pt reports increased independence w/ practicing. Pt pleasant and cooperative. Pt motivated to regain strength, endurance and independence in ADLs and ADL transfers.    Plan of Care: Will benefit from Occupational Therapy 3 times per week  Discharge Recommendations:  Equipment Will Continue to Assess for Equipment Needs.     See \"Rehab Therapy-Acute\" Patient Summary Report for complete documentation.   "

## 2018-07-23 NOTE — THERAPY
"Physical Therapy Treatment completed.   Bed Mobility:  Supine to Sit: Contact Guard Assist (bed flat, used rail (has rail at home))  Transfers: Sit to Stand: Contact Guard Assist (from EOB->FWW)  Gait: Level Of Assist: Contact Guard Assist with 4-Wheel Walker  x25 ft x2 reps     Plan of Care: Will benefit from Physical Therapy 4 times per week  Discharge Recommendations: Equipment: No Equipment Needed. Post-acute therapy Discharge to a transitional care facility for continued skilled therapy services.     Patient is progressing mobility. She was able to perform supine<>sit with CGA and minimal cues for log roll tech. Patient is limited by decreased activity tolerance, back pain, and PERI LE weakness. Patient with PERI dorsiflexor weakness and ankle instability. Will continue to follow while in acute care. D/c pending LOS and progress, at this point would still recommend post acute services, however patient may progress to be able to d/c to home with OP PT or HH PT if she remains in acute care for extended period of time.     See \"Rehab Therapy-Acute\" Patient Summary Report for complete documentation.       "

## 2018-07-24 PROBLEM — R52 PAIN: Status: ACTIVE | Noted: 2018-07-24

## 2018-07-24 PROBLEM — R19.4 BOWEL HABIT CHANGES: Status: ACTIVE | Noted: 2018-07-24

## 2018-07-24 PROBLEM — E78.00 HIGH CHOLESTEROL: Status: ACTIVE | Noted: 2018-07-24

## 2018-07-24 PROBLEM — I10 HTN (HYPERTENSION): Status: ACTIVE | Noted: 2018-07-24

## 2018-07-24 LAB
25(OH)D3 SERPL-MCNC: 49 NG/ML (ref 30–100)
ALBUMIN SERPL BCP-MCNC: 3.3 G/DL (ref 3.2–4.9)
ALBUMIN/GLOB SERPL: 1 G/DL
ALP SERPL-CCNC: 59 U/L (ref 30–99)
ALT SERPL-CCNC: 17 U/L (ref 2–50)
ANION GAP SERPL CALC-SCNC: 10 MMOL/L (ref 0–11.9)
AST SERPL-CCNC: 20 U/L (ref 12–45)
BASOPHILS # BLD AUTO: 0.4 % (ref 0–1.8)
BASOPHILS # BLD: 0.03 K/UL (ref 0–0.12)
BILIRUB SERPL-MCNC: 0.5 MG/DL (ref 0.1–1.5)
BUN SERPL-MCNC: 17 MG/DL (ref 8–22)
CALCIUM SERPL-MCNC: 9 MG/DL (ref 8.5–10.5)
CHLORIDE SERPL-SCNC: 96 MMOL/L (ref 96–112)
CO2 SERPL-SCNC: 28 MMOL/L (ref 20–33)
CREAT SERPL-MCNC: 0.82 MG/DL (ref 0.5–1.4)
EOSINOPHIL # BLD AUTO: 0.43 K/UL (ref 0–0.51)
EOSINOPHIL NFR BLD: 5.7 % (ref 0–6.9)
ERYTHROCYTE [DISTWIDTH] IN BLOOD BY AUTOMATED COUNT: 39.7 FL (ref 35.9–50)
EST. AVERAGE GLUCOSE BLD GHB EST-MCNC: 120 MG/DL
GLOBULIN SER CALC-MCNC: 3.2 G/DL (ref 1.9–3.5)
GLUCOSE SERPL-MCNC: 111 MG/DL (ref 65–99)
HBA1C MFR BLD: 5.8 % (ref 0–5.6)
HCT VFR BLD AUTO: 33.5 % (ref 37–47)
HGB BLD-MCNC: 11.6 G/DL (ref 12–16)
IMM GRANULOCYTES # BLD AUTO: 0.07 K/UL (ref 0–0.11)
IMM GRANULOCYTES NFR BLD AUTO: 0.9 % (ref 0–0.9)
LYMPHOCYTES # BLD AUTO: 1.94 K/UL (ref 1–4.8)
LYMPHOCYTES NFR BLD: 25.6 % (ref 22–41)
MCH RBC QN AUTO: 30.7 PG (ref 27–33)
MCHC RBC AUTO-ENTMCNC: 34.6 G/DL (ref 33.6–35)
MCV RBC AUTO: 88.6 FL (ref 81.4–97.8)
MONOCYTES # BLD AUTO: 0.69 K/UL (ref 0–0.85)
MONOCYTES NFR BLD AUTO: 9.1 % (ref 0–13.4)
NEUTROPHILS # BLD AUTO: 4.42 K/UL (ref 2–7.15)
NEUTROPHILS NFR BLD: 58.3 % (ref 44–72)
NRBC # BLD AUTO: 0 K/UL
NRBC BLD-RTO: 0 /100 WBC
PLATELET # BLD AUTO: 312 K/UL (ref 164–446)
PMV BLD AUTO: 10.4 FL (ref 9–12.9)
POTASSIUM SERPL-SCNC: 3.6 MMOL/L (ref 3.6–5.5)
PROT SERPL-MCNC: 6.5 G/DL (ref 6–8.2)
RBC # BLD AUTO: 3.78 M/UL (ref 4.2–5.4)
SODIUM SERPL-SCNC: 134 MMOL/L (ref 135–145)
TSH SERPL DL<=0.005 MIU/L-ACNC: 2.16 UIU/ML (ref 0.38–5.33)
WBC # BLD AUTO: 7.6 K/UL (ref 4.8–10.8)

## 2018-07-24 PROCEDURE — 99223 1ST HOSP IP/OBS HIGH 75: CPT | Performed by: PHYSICAL MEDICINE & REHABILITATION

## 2018-07-24 PROCEDURE — A9270 NON-COVERED ITEM OR SERVICE: HCPCS | Performed by: PHYSICAL MEDICINE & REHABILITATION

## 2018-07-24 PROCEDURE — 36415 COLL VENOUS BLD VENIPUNCTURE: CPT

## 2018-07-24 PROCEDURE — 97530 THERAPEUTIC ACTIVITIES: CPT

## 2018-07-24 PROCEDURE — 83036 HEMOGLOBIN GLYCOSYLATED A1C: CPT

## 2018-07-24 PROCEDURE — 85025 COMPLETE CBC W/AUTO DIFF WBC: CPT

## 2018-07-24 PROCEDURE — 770010 HCHG ROOM/CARE - REHAB SEMI PRIVAT*

## 2018-07-24 PROCEDURE — 700111 HCHG RX REV CODE 636 W/ 250 OVERRIDE (IP): Performed by: PHYSICAL MEDICINE & REHABILITATION

## 2018-07-24 PROCEDURE — 97162 PT EVAL MOD COMPLEX 30 MIN: CPT

## 2018-07-24 PROCEDURE — 97116 GAIT TRAINING THERAPY: CPT

## 2018-07-24 PROCEDURE — 97535 SELF CARE MNGMENT TRAINING: CPT

## 2018-07-24 PROCEDURE — 80053 COMPREHEN METABOLIC PANEL: CPT

## 2018-07-24 PROCEDURE — 97167 OT EVAL HIGH COMPLEX 60 MIN: CPT

## 2018-07-24 PROCEDURE — 700102 HCHG RX REV CODE 250 W/ 637 OVERRIDE(OP): Performed by: PHYSICAL MEDICINE & REHABILITATION

## 2018-07-24 PROCEDURE — 84443 ASSAY THYROID STIM HORMONE: CPT

## 2018-07-24 PROCEDURE — 700112 HCHG RX REV CODE 229: Performed by: PHYSICAL MEDICINE & REHABILITATION

## 2018-07-24 PROCEDURE — 700101 HCHG RX REV CODE 250: Performed by: PHYSICAL MEDICINE & REHABILITATION

## 2018-07-24 PROCEDURE — 82306 VITAMIN D 25 HYDROXY: CPT

## 2018-07-24 RX ORDER — AMOXICILLIN 250 MG
1 CAPSULE ORAL 2 TIMES DAILY
Status: DISCONTINUED | OUTPATIENT
Start: 2018-07-25 | End: 2018-08-10 | Stop reason: HOSPADM

## 2018-07-24 RX ORDER — AMOXICILLIN 250 MG
2 CAPSULE ORAL 2 TIMES DAILY PRN
Status: DISCONTINUED | OUTPATIENT
Start: 2018-07-24 | End: 2018-08-10 | Stop reason: HOSPADM

## 2018-07-24 RX ORDER — POLYETHYLENE GLYCOL 3350 17 G/17G
1 POWDER, FOR SOLUTION ORAL
Status: DISCONTINUED | OUTPATIENT
Start: 2018-07-24 | End: 2018-08-10 | Stop reason: HOSPADM

## 2018-07-24 RX ORDER — OMEPRAZOLE 20 MG/1
20 CAPSULE, DELAYED RELEASE ORAL DAILY
Status: DISCONTINUED | OUTPATIENT
Start: 2018-07-25 | End: 2018-08-10 | Stop reason: HOSPADM

## 2018-07-24 RX ORDER — BISACODYL 10 MG
10 SUPPOSITORY, RECTAL RECTAL
Status: DISCONTINUED | OUTPATIENT
Start: 2018-07-24 | End: 2018-08-10 | Stop reason: HOSPADM

## 2018-07-24 RX ORDER — DILTIAZEM HYDROCHLORIDE 120 MG/1
240 CAPSULE, COATED, EXTENDED RELEASE ORAL DAILY
Status: DISCONTINUED | OUTPATIENT
Start: 2018-07-25 | End: 2018-08-10 | Stop reason: HOSPADM

## 2018-07-24 RX ADMIN — DOCUSATE SODIUM 100 MG: 100 CAPSULE, LIQUID FILLED ORAL at 08:31

## 2018-07-24 RX ADMIN — BACITRACIN ZINC AND POLYMYXIN B SULFATE: at 20:53

## 2018-07-24 RX ADMIN — BACITRACIN ZINC AND POLYMYXIN B SULFATE: at 08:38

## 2018-07-24 RX ADMIN — OXYCODONE HYDROCHLORIDE 10 MG: 10 TABLET ORAL at 17:50

## 2018-07-24 RX ADMIN — PREGABALIN 300 MG: 100 CAPSULE ORAL at 08:30

## 2018-07-24 RX ADMIN — OXYCODONE HYDROCHLORIDE 15 MG: 5 TABLET ORAL at 02:04

## 2018-07-24 RX ADMIN — DILTIAZEM HYDROCHLORIDE 240 MG: 120 CAPSULE, COATED, EXTENDED RELEASE ORAL at 05:29

## 2018-07-24 RX ADMIN — HYDROCHLOROTHIAZIDE 25 MG: 25 TABLET ORAL at 08:31

## 2018-07-24 RX ADMIN — VITAMIN D, TAB 1000IU (100/BT) 2000 UNITS: 25 TAB at 08:31

## 2018-07-24 RX ADMIN — ENOXAPARIN SODIUM 40 MG: 100 INJECTION SUBCUTANEOUS at 20:47

## 2018-07-24 RX ADMIN — OXYCODONE HYDROCHLORIDE 10 MG: 10 TABLET ORAL at 20:51

## 2018-07-24 RX ADMIN — GABAPENTIN 600 MG: 300 CAPSULE ORAL at 20:47

## 2018-07-24 RX ADMIN — CALCIUM CARBONATE (ANTACID) CHEW TAB 500 MG 500 MG: 500 CHEW TAB at 20:47

## 2018-07-24 RX ADMIN — LISINOPRIL 20 MG: 20 TABLET ORAL at 08:31

## 2018-07-24 RX ADMIN — GABAPENTIN 600 MG: 300 CAPSULE ORAL at 14:53

## 2018-07-24 RX ADMIN — OXYCODONE HYDROCHLORIDE 15 MG: 5 TABLET ORAL at 05:29

## 2018-07-24 RX ADMIN — GABAPENTIN 600 MG: 300 CAPSULE ORAL at 08:30

## 2018-07-24 RX ADMIN — OXYCODONE HYDROCHLORIDE 10 MG: 10 TABLET ORAL at 11:18

## 2018-07-24 RX ADMIN — Medication 2 TABLET: at 08:31

## 2018-07-24 RX ADMIN — CALCIUM CARBONATE (ANTACID) CHEW TAB 500 MG 500 MG: 500 CHEW TAB at 08:30

## 2018-07-24 RX ADMIN — DOCUSATE SODIUM 100 MG: 100 CAPSULE, LIQUID FILLED ORAL at 20:47

## 2018-07-24 ASSESSMENT — GAIT ASSESSMENTS
DEVIATION: BRADYKINETIC;DECREASED HEEL STRIKE;OTHER (COMMENT)
GAIT LEVEL OF ASSIST: STAND BY ASSIST
ASSISTIVE DEVICE: 4 WHEEL WALKER
DISTANCE (FEET): 40

## 2018-07-24 ASSESSMENT — PATIENT HEALTH QUESTIONNAIRE - PHQ9
SUM OF ALL RESPONSES TO PHQ9 QUESTIONS 1 AND 2: 0
1. LITTLE INTEREST OR PLEASURE IN DOING THINGS: NOT AT ALL
SUM OF ALL RESPONSES TO PHQ9 QUESTIONS 1 AND 2: 0
1. LITTLE INTEREST OR PLEASURE IN DOING THINGS: NOT AT ALL
2. FEELING DOWN, DEPRESSED, IRRITABLE, OR HOPELESS: NOT AT ALL
2. FEELING DOWN, DEPRESSED, IRRITABLE, OR HOPELESS: NOT AT ALL

## 2018-07-24 ASSESSMENT — BRIEF INTERVIEW FOR MENTAL STATUS (BIMS)
ASKED TO RECALL BED: YES, NO CUE REQUIRED
BIMS SUMMARY SCORE: 13
ASKED TO RECALL SOCK: NO, COULD NOT RECALL
WHAT DAY OF THE WEEK IS IT: CORRECT
INITIAL REPETITION OF BED BLUE SOCK - FIRST ATTEMPT: 3
ASKED TO RECALL BLUE: YES, NO CUE REQUIRED
WHAT MONTH IS IT: ACCURATE WITHIN 5 DAYS
WHAT YEAR IS IT: CORRECT

## 2018-07-24 ASSESSMENT — ACTIVITIES OF DAILY LIVING (ADL): TOILETING: INDEPENDENT

## 2018-07-24 ASSESSMENT — PAIN SCALES - GENERAL
PAINLEVEL_OUTOF10: 5
PAINLEVEL_OUTOF10: 7
PAINLEVEL_OUTOF10: 2
PAINLEVEL_OUTOF10: 1
PAINLEVEL_OUTOF10: 9

## 2018-07-24 NOTE — THERAPY
Occupational Therapy Initial Plan of Care Note    1) Assessment:  The patient is a 53 y.o. female with a past medical history of hypertension, lumbar spinal stenosis, admitted on 7/17/2018 5:36 AM for an elective lumbar surgery. Per review of the medical record and confirmation with the patient, she had critical Lumbar stenosis with severe L2-5 sensory and motor radiculopathy for which she was admitted for a L2-5 decompression and posterolateral arthrodesis with Dr. Perez on 7/18. Pt reported difficulty w/ BM's - can feel she needs to go, but typically does not make it to the commode in time. Inconsistant sensation of BLE's.  Long term and short term goals have been discussed with patient and they are in agreement.  2) Plan:  Recommend Occupational Therapy  minutes per day 5-7 days per week for 3 weeks for the following treatments:  OT Group Therapy, OT Self Care/ADL, OT Neuro Re-Ed/Balance, OT Therapeutic Activity, OT Evaluation and OT Therapeutic Exercise.  3) Goals:  Please refer to care plan for goals.

## 2018-07-24 NOTE — IDT DISCHARGE PLANNING
CASE MANAGEMENT INITIAL ASSESSMENT    Admit Date:  7/23/2018     I met with patient to discuss role of case management / discharge planning / team conference.   Patient is a  53 y.o. female transferred from [facility] where she was on the neurosurgery floor from 7/17-7/23/2018 where patient was there for a lumbar laminectomy.     Accepting doctor is Dr. Higinio Hope M.D.     Diagnosis: 08.9 Other Orthopedic  Lumbar stenosis with neurogenic claudication    Co-morbidities:   Patient Active Problem List    Diagnosis Date Noted   • HTN (hypertension) 07/24/2018   • High cholesterol 07/24/2018   • Pain 07/24/2018   • Bowel habit changes 07/24/2018   • Lumbar stenosis with neurogenic claudication 07/20/2018   • Arthralgia of knee, right 04/10/2017   • Essential hypertension 03/27/2017   • Family history of coronary artery disease 03/13/2017   • Articular cartilage disorder, pelvic region and thigh 12/05/2013   • Disability examination 05/09/2012     Prior Living Situation:  Housing / Facility: 1 Story Apartment / Condo (handicap assessible )  Lives with - Patient's Self Care Capacity: Other (Comments) (ex-  )    Prior Level of Function:  Medication Management: Independent  Finances: Independent  Home Management: Independent  Shopping: Independent  Prior Level Of Mobility: Independent With Device in Community, Independent With Device in Home  Driving / Transportation: Driving Independent    Support Systems:  Primary : Elaine Quintanilla, sister [phone]  Other support systems: no others identified.        Previous Services Utilized:   Equipment Owned: 4-Wheel Walker, Single Point Cane, Bed Side Commode  Prior Services: Intermittent Physical Support for ADL Per Family, patients sister helps with care of ex- during this time.     Other Information:  Occupation (Pre-Hospital Vocational): Retired Due To Disability     Primary Payor Source: Medicaid  Primary Care Practitioner : Dr. Mayda Pfeiffer,  M.D.  Other MDs: Dr. Perez, Neurosurgery    Patient / Family Goal:  Patient / Family Goal: To get stronger and go back home     Additional Case Management Questions:  Have you ever received case management services for yourself or a family member?  Yes, at the main hospital I saw a .    Do you feel you have an an understanding of of what services  provide?  Yes, letter of explanation given to patient.     Do you have any additional questions regarding case management?  No      Plan:  1. Continue to follow patient through hospitalization and provide discharge planning in collaboration with patient, family, physicians and ancillary services.     2. Utilize community resources to ensure a safe discharge.

## 2018-07-24 NOTE — PROGRESS NOTES
2000-2 RN skin check done by primary RN Emmy and charge RN Blanche. Skin head to toe all clear and intact except for the back surgical incision and some skin tears post surgery. See  for photos.

## 2018-07-24 NOTE — CARE PLAN
Problem: Safety  Goal: Will remain free from injury  Pt uses call light consistently and appropriately. Waits for assistance does not attempt self transfer this shift. Able to verbalize needs.    Problem: Infection  Goal: Will remain free from infection  Patient remains free of infection as evidenced by normal vital signs and breath sounds. Will continue monitoring.    Problem: Venous Thromboembolism (VTW)/Deep Vein Thrombosis (DVT) Prevention:  Goal: Patient will participate in Venous Thrombosis (VTE)/Deep Vein Thrombosis (DVT)Prevention Measures  Patient on Lovenox therapy for DVT prophylaxis.    Problem: Pain Management  Goal: Pain level will decrease to patient's comfort goal  Patient reports satisfactory pain control and decrease intensity after pharmacological pain management.

## 2018-07-24 NOTE — CARE PLAN
"Problem: Safety  Goal: Will remain free from injury  Patient uses call light  appropriately this shift.  Waits for assistance when needed and does not attempt self transfer.  Able to verbalize needs.  Will continue to monitor.    Problem: Pain Management  Goal: Pain level will decrease to patient's comfort goal  Roxicodone 10mg given PRN per MAR for c/o back pain 8-9/10 with moderate relief. Pt sleeps on and off. Offered trazodone but refused to take. She says \"I don't want to be too sleepy.\" Will continue to monitor.      "

## 2018-07-24 NOTE — PROGRESS NOTES
PROVIDER CONTACTED: USHA Luna  CALL TIME: 1030  REASON FOR CALL: questions regarding incision. It appears almost slightly swollen above top of incision.   CALLBACK TIME: 1035  RESULTS: PA has assessed the wound this am, no concerns just patient anatomy.

## 2018-07-24 NOTE — PROGRESS NOTES
Patient admitted to facility at 17:40 via wheel chair; accompanied by hospital transport.  Patient assisted to room and positioned in bed for comfort and safety; call light within reach.  Patient assisted with stowing belongings and oriented to room and facility.  Admission assessment performed and documented in computer.  Admission paperwork completed; signed copies placed in chart.  Will continue to monitor.

## 2018-07-24 NOTE — REHAB-PHARMACY IDT TEAM NOTE
Pharmacy   Pharmacy  Antibiotics/Antifungals/Antivirals:  Medication:      Active Orders     None        Route:         n/a  Stop Date:  n/a  Reason:   Antihypertensives/Cardiac:  Medication:    Orders (72h ago through future)    Start     Ordered    07/24/18 0900  hydroCHLOROthiazide (HYDRODIURIL) tablet 25 mg  DAILY      07/23/18 1753    07/24/18 0900  lisinopril (PRINIVIL) tablet 20 mg  DAILY      07/23/18 1753    07/24/18 0600  DILTIAZem CD (CARDIZEM CD) capsule 240 mg  Q DAY     Comments:  This medication has been substituted for Tiaztia XT per P&T Therapeutic Substitution Protocol.    07/23/18 1753    07/23/18 1753  hydrALAZINE (APRESOLINE) tablet 25 mg  EVERY 8 HOURS PRN      07/23/18 1753        Patient Vitals for the past 24 hrs:   BP Pulse   07/24/18 0700 110/53 (!) 56   07/24/18 0525 107/66 76   07/23/18 1920 114/72 86   07/23/18 1800 132/88 96       Anticoagulation:  Medication:  Lovenox  INR:      Other key medications: gabapentin, pregabalin, tizanidine  A review of the medication list reveals no issues at this time. Patient is currently on antihypertensive(s). Recommend home blood pressure monitoring/recording if antihypertensive(s) regimen(s) continue.  Section completed by:  Renu Garcia [facility]

## 2018-07-24 NOTE — H&P
"REHABILITATION HISTORY AND PHYSICAL/POST ADMISSION PHYSICAL EVALUATION    Date of Admission: 7/24/2018  11:10 AM  Sari Ceja  [id number]    [facility] Code / Diagnosis to Support: 04.130 Other Non-Traumatic Spinal Cord Dysfunction  Reason for admission: Lumbar stenosis with neurogenic claudication    HPI:  Per PM&R Consult note by Dr. Hunt on 7/19/18:  \"The patient is a 53 y.o. female with a past medical history of hypertension, lumbar spinal stenosis, admitted on 7/17/2018  5:36 AM for an elective lumbar surgery.     Per review of the medical record and confirmation with the patient, she had critical  Lumbar stenosis with severe L2-5 sensory and motor radiculopathy for which she was admitted for a L2-5 decompression and posterolateral arthrodesis with Dr. Perez on 7/18. She has used 90 OME in last 12 hours.      The patient currently reports she was followed for many years by Dr. Mendoza for pain. She started having pain and numbness in her right leg this February, had a \"back injection\" that helped, then had symptoms on the left leg in March, had an epidural which did not help and made her pain worse. After the epidural she then had weakness in her ankles bilaterally. She then developed worsened pain, as well as saddle anesthesia, for months prior to her getting a referral to neurosurgery. She even saw a podiatrist for the numbness in her feet, and she got an ankle brace. She states she had bladder incontinence prior to her surgery because she couldn't feel when her bladder was full. She still reports numbness in her legs/feet/saddle area, but there is no more burning pain. Still has severe weakness at her ankles. She hasn't moved her bowels since surgery. 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  Patient transferred on 7/23/18 without " "incident.  Patient reports she has had BMs but cannot feel them completely. She reports she can feel when she needs to void but that she cannot get to bathroom quickly enough. She reports patch sensation in bilateral feet. She reports slowly improving strength. She reports no steps into home and she takes care of  who has dementia and alcoholic (recently fallen off the wagon since her illness).     REVIEW OF SYSTEMS:     Comprehensive 14 point ROS was reviewed and all were negative except as noted elsewhere in this document.     PMH:  Past Medical History:   Diagnosis Date   • Anesthesia     \"woke up during surgery\"   • Arthritis    • Bowel habit changes     constipation   • Dental disorder     partial   • High cholesterol    • Hypertension    • Pain     both knees, right shoulder, hip area   • Renal disorder 2005    kidney infection   • Unspecified hemorrhagic conditions 2005    \"pulled IV from groin, had to hold pressure to stop bleeding and stayed additional day in the hospital\"   • Urinary incontinence        PSH:  Past Surgical History:   Procedure Laterality Date   • LUMBAR FUSION POSTERIOR  7/17/2018    Procedure: LUMBAR FUSION POSTERIOR- IN SITU POSS;  Surgeon: Robel Perez M.D.;  Location: SURGERY [facility];  Service: Neurosurgery   • LUMBAR LAMINECTOMY DISKECTOMY  7/17/2018    Procedure: LUMBAR LAMINECTOMY DISKECTOMY L2-5;  Surgeon: Robel Perez M.D.;  Location: [facility];  Service: Neurosurgery   • DEBRIDEMENT  12/5/2013    Performed by Doni Merida M.D. at [facility]   • HIP ARTHROSCOPY  12/5/2013    Performed by Doni Merida M.D. at [facility]   • ORTHOPEDIC OSTEOTOMY  12/5/2013    Performed by Doni Merida M.D. at [facility]   • INCISION AND DRAINAGE GENERAL  1993    from infected tooth   • OTHER ORTHOPEDIC SURGERY      Michael knee replacement       FAMILY HISTORY:  Family History   Problem Relation Age of " Onset   • Heart Disease     • Hypertension     • Cancer           MEDICATIONS:  Current Facility-Administered Medications   Medication Dose   • Respiratory Care per Protocol     • Pharmacy Consult Request ...Pain Management Review 1 Each  1 Each   • oxyCODONE immediate release (ROXICODONE) tablet 10 mg  10 mg   • oxyCODONE immediate-release (ROXICODONE) tablet 15 mg  15 mg   • acetaminophen (TYLENOL) tablet 650 mg  650 mg   • senna-docusate (PERICOLACE or SENOKOT S) 8.6-50 MG per tablet 2 Tab  2 Tab    And   • polyethylene glycol/lytes (MIRALAX) PACKET 1 Packet  1 Packet    And   • magnesium hydroxide (MILK OF MAGNESIA) suspension 30 mL  30 mL    And   • bisacodyl (DULCOLAX) suppository 10 mg  10 mg   • artificial tears 1.4 % ophthalmic solution 1 Drop  1 Drop   • benzocaine-menthol (CEPACOL) lozenge 1 Lozenge  1 Lozenge   • hydrALAZINE (APRESOLINE) tablet 25 mg  25 mg   • mag hydrox-al hydrox-simeth (MAALOX PLUS ES or MYLANTA DS) suspension 20 mL  20 mL   • ondansetron (ZOFRAN ODT) dispertab 4 mg  4 mg    Or   • ondansetron (ZOFRAN) syringe/vial injection 4 mg  4 mg   • traZODone (DESYREL) tablet 50 mg  50 mg   • sodium chloride (OCEAN) 0.65 % nasal spray 2 Spray  2 Spray   • benzocaine-menthol (CEPACOL) lozenge 1 Lozenge  1 Lozenge   • calcium carbonate (TUMS) chewable tab 500 mg  500 mg   • docusate sodium (COLACE) capsule 100 mg  100 mg   • tizanidine (ZANAFLEX) tablet 2 mg  2 mg   • bacitracin-polymyxin b (POLYSPORIN) 500-94896 UNIT/GM ointment     • DILTIAZem CD (CARDIZEM CD) capsule 240 mg  240 mg   • enoxaparin (LOVENOX) inj 40 mg  40 mg   • gabapentin (NEURONTIN) capsule 600 mg  600 mg   • hydroCHLOROthiazide (HYDRODIURIL) tablet 25 mg  25 mg   • lisinopril (PRINIVIL) tablet 20 mg  20 mg   • pregabalin (LYRICA) capsule 300 mg  300 mg   • vitamin D (cholecalciferol) tablet 2,000 Units  2,000 Units       ALLERGIES:  Patient has no known allergies.    PSYCHOSOCIAL HISTORY:  Housing / Facility: 1 Story  Apartment / Condo  Steps Into Home: 0  Lives with - Patient's Self Care Capacity: Other (Comments) (ex )  Equipment Owned: 4-Wheel Walker, Single Point Cane     Prior Level of Function / Living Situation:   Physical Therapy: Prior Services: Housekeeping / Homemaker Services  Housing / Facility: 1 Story Apartment / Condo  Steps Into Home: 0  Bathroom Set up: Bathtub / Shower Combination, Grab Bars  Equipment Owned: 4-Wheel Walker, Single Point Cane  Lives with - Patient's Self Care Capacity: Other (Comments) (ex )  Bed Mobility: Independent  Transfer Status: Independent  Ambulation: Independent  Assistive Devices Used: 4-Wheel Walker  Stairs: Required Assist  Current Level of Function:   Level Of Assist: Minimal Assist  Assistive Device: 4 Wheel Walker  Distance (Feet): 70  Deviation: Step To, Antalgic, Bradykinetic  Skilled Intervention: Verbal Cuing  Comments: Pt w/bilat foot drop that she had PTA, has learned to compensate. Improvement in amb distance from previous session. Standing rest breaks for UE fatigue and for upright posture.   Supine to Sit: Contact Guard Assist (HOB flat and no rail)  Sit to Supine:  (pt left up in the chair)  Scooting: Supervised (seated)  Rolling: Supervised (to the Lft w/no rail)  Comments: Instructed on log rolling technique to maintain neutral spine w/bed moblity.   Sit to Stand: Contact Guard Assist (from EOB->FWW)  Bed, Chair, Wheelchair Transfer: Contact Guard Assist (w/FWW)  Toilet Transfers: Minimal Assist  Transfer Method: Stand Pivot  Sitting in Chair: 10+ mins (left up in chair)  Sitting Edge of Bed: 3 mins  Standing: 10 minutes  Occupational Therapy:   Self Feeding: Independent  Grooming / Hygiene: Independent  Bathing: Independent  Dressing: Independent  Toileting: Independent  Medication Management: Independent  Laundry: Independent  Kitchen Mobility: Requires Assist  Home Management: Requires Assist  Shopping: Independent  Prior Level Of Mobility:  "Independent With Device in Community, Independent With Device in Home  Driving / Transportation: Driving Independent (Expresses discomfort with driving)  Prior Services: Housekeeping / Homemaker Services  Housing / Facility: 1 Story Apartment / Condo  Current Level of Function:   Lower Body Dressing: Moderate Assist  Toileting: Minimal Assist  Speech Language Pathology:      Rehabilitation Prognosis/Potential: Good  Estimated Length of Stay: 14 days     Nursing:   Orientation : Oriented x 4  Continent    CURRENT LEVEL OF FUNCTION:   Same as level of function prior to admission to [facility]    PHYSICAL EXAM:     VITAL SIGNS:   height is 1.651 m (5' 5\") and weight is 90.7 kg (200 lb). Her temperature is 37 °C (98.6 °F). Her blood pressure is 110/53 and her pulse is 56 (abnormal). Her respiration is 18 and oxygen saturation is 94%.     GENERAL: No apparent distress  HEENT: Normocephalic/atraumatic, EOMI and PERRL  CARDIAC: Regular rate and rhythm, normal S1, S2   LUNGS: Clear to auscultation   ABDOMINAL: bowel sounds present, soft and nontender    EXTREMITIES: no spasticity, or edema.  Decreased ROM bilateral ankle No calf tenderness bilaterally,  NEURO:    Mental status:  A&Ox4 (person, place, date, situation) answers questions appropriately  Speech: fluent, no aphasia or dysarthria    CRANIAL NERVES:  2,3: visual acuity grossly intact, PERRL  3,4,6: EOMI bilaterally, no nystagmus or diplopia  7: no facial asymmetry  8: hearing grossly intact  9,10: symmetric palate elevation  11: SCM/Trapezius strength 5/5 bilaterally  12: tongue protrudes midline    Motor:  5/5 BUE   Hip flexors:  Right -  4/5, Left -  4/5  Knee ext:  Right -  5/5, Left -  4/5  Dorsiflexors:  Right -  2/5, Left -  1/5  EHL:  Right -  2/5, Left -  0/5  Plantar flexors:  Right -  3/5, Left -  2/5  Sensory: Decreased 25-75 in lower extremities L2-S1    DTRs: 2+ in bilateral biceps and 1+ patellar tendons (limited by habitus)  Mute " babinski b/l     Tone: no spasticity noted    Proprioception: 100% bilateral great toes    RADIOLOGY:                                                       Results for orders placed during the hospital encounter of 05/17/18   MR-LUMBAR SPINE-W/O    Impression Advanced multilevel degenerative changes of the lumbar spine as described above.         LE VENOUS DUPLEX  FINDINGS:   Bilateral lower extremities -   Complete color filling and compressibility with normal venous flow dynamics    including spontaneous flow, response to augmentation maneuvers, and    respiratory phasicity.     LABS:  Recent Labs      07/24/18   0545   SODIUM  134*   POTASSIUM  3.6   CHLORIDE  96   CO2  28   GLUCOSE  111*   BUN  17   CREATININE  0.82   CALCIUM  9.0     Recent Labs      07/24/18   0545   WBC  7.6   RBC  3.78*   HEMOGLOBIN  11.6*   HEMATOCRIT  33.5*   MCV  88.6   MCH  30.7   MCHC  34.6   RDW  39.7   PLATELETCT  312   MPV  10.4             PRIMARY REHAB DIAGNOSIS:    This patient is a 53 y.o. female admitted for acute inpatient rehabilitation with Lumbar stenosis with neurogenic claudication.    IMPAIRMENTS:   ADLs/IADLs  Mobility    SECONDARY DIAGNOSIS/MEDICAL CO-MORBIDITIES AFFECTING FUNCTION:  Anemia  Hypertension  Peripheral neuropathy  Radiculopathy    RELEVANT CHANGES SINCE PREADMISSION EVALUATION:    Status unchanged    The patient's rehabilitation potential is Good  The patient's medical prognosis is good    PLAN:   Discussion and Recommendations:   1. The patient requires an acute inpatient rehabilitation program with a coordinated program of care at an intensity and frequency not available at a lower level of care. This recommendation is substantiated by the patient's medical physicians who recommend that the patient's intervention and assessment of medical issues needs to be done at an acute level of care for patient's safety and maximum outcome.   2. A coordinated program of care will be supplied by an interdisciplinary  team of physical therapy, occupational therapy, rehab physician, rehab nursing, and, if needed, speech therapy and rehab psychology. Rehab team presents a patient-specific rehabilitation and education program concentrating on prevention of future problems related to accessibility, mobility, skin, bowel, bladder, sexuality, and psychosocial and medical/surgical problems.   3. Need for Rehabilitation Physician: The rehab physician will be evaluating the patient on a multi-weekly basis to help coordinate the program of care. The rehab physician communicates between medical physicians, therapists, and nurses to maximize the patient's potential outcome. Specific areas in which the rehab physician will be providing daily assessment include the following:   A. Assessing the patient's heart rate and blood pressure response (vitals monitoring) to activity and making adjustments in medications or conservative measures as needed.   B. The rehab physician will be assessing the frequency at which the program can be increased to allow the patient to reach optimal functional outcome.   C. The rehab physician will also provide assessments in daily skin care, especially in light of patient's impairments in mobility.   D. The rehab physician will provide special expertise in understanding how to work with functional impairment and recommend appropriate interventions, compensatory techniques, and education that will facilitate the patient's outcome.   4. Rehab R.N.   The rehab RN will be working with patient to carry over in room mobility and activities of daily living when the patient is not in 3 hours of skilled therapy. Rehab nursing will be working in conjunction with rehab physician to address all the medical issues above and continue to assess laboratory work and discuss abnormalities with the treating physicians, assess vitals, and response to activity, and discuss and report abnormalities with the rehab physician. Rehab RN  will also continue daily skin care, supervise bladder/bowel program, instruct in medication administration, and ensure patient safety.   5. Rehab Therapy: Therapies to treat at intensity and frequency of (may change after completion of evaluation by all therapeutic disciplines):       PT:  Physical therapy to address mobility, transfer, gait training and evaluation for adaptive equipment needs 1 and 1/2 hour/day at least 5 days/week for the duration of the ELOS (see below)       OT:  Occupational therapy to address ADLs, self-care, home management training, functional mobility/transfers and assistive device evaluation, and community re-integration 1 and 1/2 hour/day at least 5 days/week for the duration of the ELOS (see below).     Medical management / Rehabilitation Issues/ Adverse Potential as part of rehabilitation plan     REHABILITATION ISSUES/ADVERSE POTENTIAL:  1.  Severe lumbar stenosis w cauda equina - Patient with pre-operative bowel/bladder changes as well as decreased sensation and bilateral foot drop suggestive of cauda equina.  Patient is s/p L2-L5 decompression and posterior fusion with Dr. Perez on 7/18/18.  Since surgery patient has improved with bowel/bladder but continues to have weakness and decreased sensation.  Patient demonstrates functional deficits in strength, balance, coordination, and ADL's. Patient is admitted to [facility] for comprehensive rehabilitation therapy as described below.   Rehabilitation nursing monitors bowel and bladder control, educates on medication administration, co-morbidities and monitors patient safety.    2.  Neurostimulants: None at this time but continue to assess daily for need to initiate should status change.    3.  DVT prophylaxis:  Patient is on Lovenox for anticoagulation upon transfer. Encourage OOB. Monitor daily for signs and symptoms of DVT including but not limited to swelling and pain to prevent the development of DVT that may  interfere with therapies.    4.  GI prophylaxis:  On prilosec to prevent gastritis/dyspepsia which may interfere with therapies.    5.  Pain: No issues with pain currently / Controlled with APAP/Oxycodone and Lyrica    6.  Nutrition/Dysphagia: Dietician monitors nutrient intake, recommend supplements prn and provide nutrition education to pt/family to promote optimal nutrition for wound healing/recovery.     7.  Bladder/bowel:  Start bowel and bladder program as described below, to prevent constipation, urinary retention (which may lead to UTI), and urinary incontinence (which will impact upon pt's functional independence).   - Post void bladder scans, I&O cath for PVRs >400  - up to commode after meal     8.  Skin/dermal ulcer prophylaxis: Monitor for new skin conditions with q.2 h. turns as required to prevent the development of skin breakdown.     9.  Cognition/Behavior:  Psychologist Dr. Bolanos provides adjustment counseling to illness and psychosocial barriers that may be potential barriers to rehabilitation.     10. Respiratory therapy: RT performs O2 management prn, breathing retraining, pulmonary hygiene and bronchospasm management prn to optimize participation in therapies.     MEDICAL CO-MORBIDITIES/ADVERSE POTENTIAL AFFECTING FUNCTION:   Severe lumbar stenosis w cauda equina - Patient with pre-operative bowel/bladder changes as well as decreased sensation and bilateral foot drop suggestive of cauda equina.  Patient is s/p L2-L5 decompression and posterior fusion with Dr. Perez on 7/18/18.  Since surgery patient has improved with bowel/bladder but continues to have weakness and decreased sensation.  -LSO when OOB with spinal precautions  -PT and OT to evaluate and treat mobility and ADLs.  -Follow-up with Dr. Perez' clinic 2 weeks and 6 weeks post-discharge    Neurogenic Bowel/Bladder - Patient with constipation and urinary incontinence prior to surgery. Appears to be improved but will need to monitor.     -Continue scheduled Senna-Docusate.  -Will need to check PVRs.    Pain - Patient with both neuropathic and post-operative pain.  Discussed need to reduce opiate use.  Currently on Gabapentin 600 mg TID and Lyrica 300 mg daily.  Oxycodone and Tylenol PRN.     HTN/CV - Patient on HCTZ 25 mg daily, Lisinopril 20 mg daily, Diltiazem  mg daily    Vitamin D - On supplementation, admission labs within normal limits. Continue 2000 daily    GI Ppx - Patient on prilosec     DVT Ppx - Patient on Lovenox on transfer. Will monitor for ambulation.    Pt was seen today for 85 min, and entire time spent in face-to-face contact was >50% in counseling and coordination of care as detailed in A/P above.        GOALS/EXPECTED LEVEL OF FUNCTION BASED ON CURRENT MEDICAL AND FUNCTIONAL STATUS (may change based on patient's medical status and rate of impairment recovery):  Transfers:   Modified Independent  Mobility/Gait:   Modified Independent  ADL's:   Modified Independent    DISPOSITION: Discharge to pre-morbid independent living setting with the supportive care of patient's family.      ELOS: 14-21 days

## 2018-07-24 NOTE — REHAB-CM IDT TEAM NOTE
Case Management    DC Planning  DC destination/dispostion:  To return to her one story handicap assessable apartment where she lives with her ex- in [place], [place]     Referrals: will need f/u with pcp. Dr. Pfeiffer and Dr. Perez, neurosurgery.     DC Needs: has 4ws, spc and bs commode at home, states her sister has a hospital bed that she can use when she goes home    Barriers to discharge:  Lives with ex- who has dementia    Strengths: Has her sister, Elaine who helps her and helps with her ex- also.     Section completed by:  Gabriella Cook R.N.

## 2018-07-24 NOTE — PROGRESS NOTES
Report called at 1700 to RN at receiving facility. All questions answered and call back number provided for further inquiry.    Patient discharged to rehab by transport van with transporter. Patient discharged via wheelchair, hospital escort present. Patient stable at time of discharge. IV in place per facility instruction. Discharge instructions were reviewed and all questions answered. All personal belongings returned prior to discharge, including home medications.

## 2018-07-25 PROCEDURE — 99233 SBSQ HOSP IP/OBS HIGH 50: CPT | Performed by: PHYSICAL MEDICINE & REHABILITATION

## 2018-07-25 PROCEDURE — 700112 HCHG RX REV CODE 229: Performed by: PHYSICAL MEDICINE & REHABILITATION

## 2018-07-25 PROCEDURE — 700111 HCHG RX REV CODE 636 W/ 250 OVERRIDE (IP): Performed by: PHYSICAL MEDICINE & REHABILITATION

## 2018-07-25 PROCEDURE — 97110 THERAPEUTIC EXERCISES: CPT

## 2018-07-25 PROCEDURE — 97535 SELF CARE MNGMENT TRAINING: CPT

## 2018-07-25 PROCEDURE — A9270 NON-COVERED ITEM OR SERVICE: HCPCS | Performed by: PHYSICAL MEDICINE & REHABILITATION

## 2018-07-25 PROCEDURE — 94760 N-INVAS EAR/PLS OXIMETRY 1: CPT

## 2018-07-25 PROCEDURE — 700102 HCHG RX REV CODE 250 W/ 637 OVERRIDE(OP): Performed by: PHYSICAL MEDICINE & REHABILITATION

## 2018-07-25 PROCEDURE — 770010 HCHG ROOM/CARE - REHAB SEMI PRIVAT*

## 2018-07-25 PROCEDURE — 97530 THERAPEUTIC ACTIVITIES: CPT

## 2018-07-25 PROCEDURE — 97116 GAIT TRAINING THERAPY: CPT

## 2018-07-25 RX ADMIN — OXYCODONE HYDROCHLORIDE 10 MG: 10 TABLET ORAL at 18:28

## 2018-07-25 RX ADMIN — CALCIUM CARBONATE (ANTACID) CHEW TAB 500 MG 500 MG: 500 CHEW TAB at 19:44

## 2018-07-25 RX ADMIN — ENOXAPARIN SODIUM 40 MG: 100 INJECTION SUBCUTANEOUS at 19:45

## 2018-07-25 RX ADMIN — GABAPENTIN 600 MG: 300 CAPSULE ORAL at 19:44

## 2018-07-25 RX ADMIN — GABAPENTIN 600 MG: 300 CAPSULE ORAL at 14:11

## 2018-07-25 RX ADMIN — PREGABALIN 300 MG: 100 CAPSULE ORAL at 08:13

## 2018-07-25 RX ADMIN — TIZANIDINE 2 MG: 4 TABLET ORAL at 04:30

## 2018-07-25 RX ADMIN — HYDROCHLOROTHIAZIDE 25 MG: 25 TABLET ORAL at 08:13

## 2018-07-25 RX ADMIN — LISINOPRIL 20 MG: 20 TABLET ORAL at 08:13

## 2018-07-25 RX ADMIN — VITAMIN D, TAB 1000IU (100/BT) 2000 UNITS: 25 TAB at 08:12

## 2018-07-25 RX ADMIN — DILTIAZEM HYDROCHLORIDE 240 MG: 120 CAPSULE, COATED, EXTENDED RELEASE ORAL at 08:13

## 2018-07-25 RX ADMIN — GABAPENTIN 600 MG: 300 CAPSULE ORAL at 08:13

## 2018-07-25 RX ADMIN — OMEPRAZOLE 20 MG: 20 CAPSULE, DELAYED RELEASE ORAL at 08:13

## 2018-07-25 RX ADMIN — OXYCODONE HYDROCHLORIDE 10 MG: 10 TABLET ORAL at 21:29

## 2018-07-25 RX ADMIN — SENNOSIDES AND DOCUSATE SODIUM 1 TABLET: 8.6; 5 TABLET ORAL at 08:13

## 2018-07-25 RX ADMIN — BACITRACIN ZINC AND POLYMYXIN B SULFATE: at 19:47

## 2018-07-25 RX ADMIN — OXYCODONE HYDROCHLORIDE 10 MG: 10 TABLET ORAL at 04:30

## 2018-07-25 RX ADMIN — OXYCODONE HYDROCHLORIDE 10 MG: 10 TABLET ORAL at 08:13

## 2018-07-25 RX ADMIN — BACITRACIN ZINC AND POLYMYXIN B SULFATE: at 08:18

## 2018-07-25 RX ADMIN — CALCIUM CARBONATE (ANTACID) CHEW TAB 500 MG 500 MG: 500 CHEW TAB at 08:13

## 2018-07-25 RX ADMIN — DOCUSATE SODIUM 100 MG: 100 CAPSULE, LIQUID FILLED ORAL at 08:13

## 2018-07-25 RX ADMIN — OXYCODONE HYDROCHLORIDE 10 MG: 10 TABLET ORAL at 14:11

## 2018-07-25 ASSESSMENT — PAIN SCALES - GENERAL
PAINLEVEL_OUTOF10: 2
PAINLEVEL_OUTOF10: 6
PAINLEVEL_OUTOF10: 6
PAINLEVEL_OUTOF10: 3

## 2018-07-25 ASSESSMENT — PATIENT HEALTH QUESTIONNAIRE - PHQ9
1. LITTLE INTEREST OR PLEASURE IN DOING THINGS: NOT AT ALL
SUM OF ALL RESPONSES TO PHQ9 QUESTIONS 1 AND 2: 0
SUM OF ALL RESPONSES TO PHQ9 QUESTIONS 1 AND 2: 0
2. FEELING DOWN, DEPRESSED, IRRITABLE, OR HOPELESS: NOT AT ALL
2. FEELING DOWN, DEPRESSED, IRRITABLE, OR HOPELESS: NOT AT ALL
1. LITTLE INTEREST OR PLEASURE IN DOING THINGS: NOT AT ALL

## 2018-07-25 NOTE — REHAB-PT IDT TEAM NOTE
Physical Therapy   Mobility  Bed mobility:   SBA  Bed /Chair/Wheelchair Transfer Initial:  3 - Moderate Assistance  Bed /Chair/Wheelchair Transfer Current:  4 - Minimal Assistance   Bed/Chair/Wheelchair Transfer Description:  Increased time, Supervision for safety, Verbal cueing, Set-up of equipment, Initial preparation for task (CG assist for SPT to/from EOB sitting and manual wc)  Walk Initial:  1 - Total Assistance  Walk Current:  1 - Total Assistance   Walk Description:   (SBA 30feet with 4WW.)  Wheelchair Initial:  1 - Total Assistance  Wheelchair Current:  1 - Total Assistance   Wheelchair Description:   (modA 30feet with BLEs)  Stairs Initial:  2 - Max Assistance  Stairs Current: 2 - Max Assistance   Stairs Description:  (4 stairs with BHRs, CGA)  Patient/Family Training/Education:  Pt educated on PT role, PT POC, rehab orientation  DME/DC Recommendations:  TBD; pt has 4WW  Strengths:  Effective communication skills, Good insight into deficits/needs, Independent PLOF, Making steady progress towards goals, Motivated for self care and independence, Pleasant and cooperative, Supportive family and Willingly participates in therapeutic activities  Barriers:   Decreased endurance, Generalized weakness, Pain poorly managed, Poor activity tolerance and Poor balance     # of short term goals set= 5  # of short term goals met=0 (Pt just evaluated)         Physical Therapy Problems           Problem: Balance     Dates: Start: 07/24/18       Goal: STG-Within one week, patient will maintain static standing     Dates: Start: 07/24/18       Description: 1) Individualized goal:  Unsupported for 1 min with SPV  2) Interventions:  PT Gait Training, PT Therapeutic Exercises, PT Neuro Re-Ed/Balance and PT Therapeutic Activity               Problem: Mobility     Dates: Start: 07/24/18       Goal: STG-Within one week, patient will ambulate household distance     Dates: Start: 07/24/18       Description: 1) Individualized goal:   With 4WW SPV without increased pain  2) Interventions: PT Gait Training, PT Therapeutic Exercises, PT Neuro Re-Ed/Balance and PT Therapeutic Activity               Goal: STG-Within one week, patient will ambulate up/down a curb     Dates: Start: 07/24/18       Description: 1) Individualized goal:  With 4WW with CGA  2) Interventions:  PT Gait Training, PT Therapeutic Exercises, PT Neuro Re-Ed/Balance and PT Therapeutic Activity                 Problem: Mobility Transfers     Dates: Start: 07/24/18       Goal: STG-Within one week, patient will perform bed mobility     Dates: Start: 07/24/18       Description: 1) Individualized goal:  SPV with flat bed, maintaining spinal precautions  2) Interventions: PT Gait Training, PT Therapeutic Exercises, PT Neuro Re-Ed/Balance and PT Therapeutic Activity               Goal: STG-Within one week, patient will transfer bed to chair     Dates: Start: 07/24/18       Description: 1) Individualized goal:  With 4WW SPV  2) Interventions:  PT Gait Training, PT Therapeutic Exercises, PT Neuro Re-Ed/Balance and PT Therapeutic Activity                 Problem: PT-Long Term Goals     Dates: Start: 07/24/18       Goal: LTG-By discharge, patient will tolerate standing     Dates: Start: 07/24/18       Description: 1) Individualized goal:  5 min without increased pain  2) Interventions: PT Gait Training, PT Therapeutic Exercises, PT Neuro Re-Ed/Balance and PT Therapeutic Activity               Goal: LTG-By discharge, patient will ambulate     Dates: Start: 07/24/18       Description: 1) Individualized goal:  With 4WW 500' Lori  2) Interventions: PT Gait Training, PT Therapeutic Exercises, PT Neuro Re-Ed/Balance and PT Therapeutic Activity               Goal: LTG-By discharge, patient will transfer one surface to another     Dates: Start: 07/24/18       Description: 1) Individualized goal:  With 4WW Lori  2) Interventions:  PT Gait Training, PT Therapeutic Exercises, PT Neuro Re-Ed/Balance and  PT Therapeutic Activity               Goal: LTG-By discharge, patient will transfer in/out of a car     Dates: Start: 07/24/18       Description: 1) Individualized goal:  With 4WW Lori, maintaining spinal precautions  2) Interventions:  PT Gait Training, PT Therapeutic Exercises, PT Neuro Re-Ed/Balance and PT Therapeutic Activity               Goal: LTG-By discharge, patient will     Dates: Start: 07/24/18       Description: 1) Individualized goal:  Ascend/descend curb with 4WW SPV  2) Interventions:  PT Gait Training, PT Therapeutic Exercises, PT Neuro Re-Ed/Balance and PT Therapeutic Activity                       Section completed by:  Janae Ortiz DPT

## 2018-07-25 NOTE — CARE PLAN
Problem: Balance  Goal: STG-Within one week, patient will maintain static standing  1) Individualized goal:  Unsupported for 1 min with SPV  2) Interventions:  PT Gait Training, PT Therapeutic Exercises, PT Neuro Re-Ed/Balance and PT Therapeutic Activity     Outcome: NOT MET      Problem: Mobility  Goal: STG-Within one week, patient will ambulate household distance  1) Individualized goal:  With 4WW SPV without increased pain  2) Interventions: PT Gait Training, PT Therapeutic Exercises, PT Neuro Re-Ed/Balance and PT Therapeutic Activity       Outcome: NOT MET    Goal: STG-Within one week, patient will ambulate up/down a curb  1) Individualized goal:  With 4WW with CGA  2) Interventions:  PT Gait Training, PT Therapeutic Exercises, PT Neuro Re-Ed/Balance and PT Therapeutic Activity       Outcome: NOT MET      Problem: Mobility Transfers  Goal: STG-Within one week, patient will perform bed mobility  1) Individualized goal:  SPV with flat bed, maintaining spinal precautions  2) Interventions: PT Gait Training, PT Therapeutic Exercises, PT Neuro Re-Ed/Balance and PT Therapeutic Activity       Outcome: NOT MET    Goal: STG-Within one week, patient will transfer bed to chair  1) Individualized goal:  With 4WW SPV  2) Interventions:  PT Gait Training, PT Therapeutic Exercises, PT Neuro Re-Ed/Balance and PT Therapeutic Activity       Outcome: NOT MET

## 2018-07-25 NOTE — CARE PLAN
Problem: Safety  Goal: Will remain free from injury  Pt uses call light consistently and appropriately. Waits for assistance does not attempt self transfer this shift. Able to verbalize needs.    Problem: Venous Thromboembolism (VTW)/Deep Vein Thrombosis (DVT) Prevention:  Goal: Patient will participate in Venous Thrombosis (VTE)/Deep Vein Thrombosis (DVT)Prevention Measures  Patient on Lovenox therapy for DVT prophylaxis.    Problem: Pain Management  Goal: Pain level will decrease to patient's comfort goal  Patient reports satisfactory pain control and decrease intensity after pharmacological pain management.

## 2018-07-25 NOTE — REHAB-OT IDT TEAM NOTE
Occupational Therapy   Activities of Daily Living  Eating Initial:  7 - Independent  Eating Current:  6 - Modified Independent   Eating Description:  Insert dentures  Grooming Initial:  6 - Modified Independent  Grooming Current:  6 - Modified Independent   Grooming Description:  Increased time  Bathing Initial:  3 - Moderate Assistance  Bathing Current:  3 - Moderate Assistance   Bathing Description:  Adaptive equipment, Grab bar, Tub bench, Long handled bath tool, Hand held shower, Increased time, Supervision for safety, Verbal cueing, Set-up of equipment (Pt required assistance for lower legs/feet, back and natalee area for wash/rinse/dry)  Upper Body Dressing Initial:  4 - Minimal Assistance  Upper Body Dressing Current:  5 - Standby Prompting/Supervision or Set-up   Upper Body Dressing Description:   (Don/doff LSO seated/standing via manual wc/EOB sitting/standing)  Lower Body Dressing Initial:  1 - Total Assistance  Lower Body Dressing Current:  3 - Moderate Assistance   Lower Body Dressing Description:  3 - Moderate Assistance  Toileting Initial:  1 - Total Assistance  Toileting Current:  2 - Max Assistance   Toileting Description:  Grab bar, Increased time, Supervision for safety, Verbal cueing, Set-up of equipment, Initial preparation for task  Toilet Transfer Initial:  4 - Minimal Assistance  Toilet Transfer Current:  4 - Minimal Assistance   Toilet Transfer Description:  4 - Minimal Assistance  Tub / Shower Transfer Initial:  4 - Minimal Assistance  Tub / Shower Transfer Current:  4 - Minimal Assistance   Tub / Shower Transfer Description:  Grab bar, Increased time, Supervision for safety, Verbal cueing, Set-up of equipment, Initial preparation for task  IADL:  TBD   Family Training/Education:  TBD   DME/DC Recommendations:  TBD     Strengths:  Independent PLOF, Motivated for self care and independence, Pleasant and cooperative and Willingly participates in therapeutic activities  Barriers:  Decreased  endurance, Generalized weakness, Limited mobility and Poor balance     # of short term goals set= 5    # of short term goals met= 3          Occupational Therapy Goals           Problem: Bathing     Dates: Start: 07/24/18       Goal: STG-Within one week, patient will bathe     Dates: Start: 07/24/18       Description: 1) Individualized Goal:  Min assist w/ AE/DME PRN  2) Interventions:  OT Self Care/ADL, OT Neuro Re-Ed/Balance, OT Therapeutic Activity, OT Evaluation and OT Therapeutic Exercise       Note:     Goal Note filed on 07/25/18 1141 by Cali Murdock MS,OTR/L    Goal: STG-Within one week, patient will bathe  Outcome: NOT MET  Pt at Mod assist w/ AE/DME PRN secondary pain, back/spinal precautions,   gen weakness/endurance and impaired balance.                  Problem: Functional Transfers     Dates: Start: 07/24/18       Goal: STG-Within one week, patient will transfer to toilet     Dates: Start: 07/24/18       Description: 1) Individualized Goal: Sup/SBA w/ DME PRN  2) Interventions:   OT Self Care/ADL, OT Neuro Re-Ed/Balance, OT Therapeutic Activity, OT Evaluation and OT Therapeutic Exercise       Note:     Goal Note filed on 07/25/18 1141 by Cali Murdock MS,OTR/L    Goal: STG-Within one week, patient will transfer to toilet  Outcome: NOT MET  Pt at min assist via grab bar to/from manual wc and commode                  Problem: OT Long Term Goals     Dates: Start: 07/24/18       Goal: LTG-By discharge, patient will complete basic self care tasks     Dates: Start: 07/24/18       Description: 1) Individualized Goal:  Mod Indep w/ AE/DME PRN  2) Interventions:   OT Self Care/ADL, OT Neuro Re-Ed/Balance, OT Therapeutic Activity, OT Evaluation and OT Therapeutic Exercise             Goal: LTG-By discharge, patient will perform bathroom transfers     Dates: Start: 07/24/18       Description: 1) Individualized Goal: Mod Indep w/ DME PRN  2) Interventions:   OT Self Care/ADL, OT Neuro Re-Ed/Balance, OT Therapeutic  Activity, OT Evaluation and OT Therapeutic Exercise                   Section completed by:  Cali Murdock MS,OTR/L

## 2018-07-25 NOTE — REHAB-COLLABORATIVE ONGOING IDT TEAM NOTE
Weekly Interdisciplinary Team Conference Note    Weekly Interdisciplinary Team Conference # 1  Date:  7/25/2018    Clinicians present and reporting at team conference include the following:   MD: Coni Hope MD   RN:  Priscila Cobb RN   PT:   Janae Ortiz, PT, DPT  OT:  Cali Murdock MS OTR/L   ST:  Not Applicable  CM:  Charlene Kiser MSW  REC:  Not Applicable  RT:  None  RPh:  Bebeto Snell [facility]  Other:   None  All reporting clinicians have a working knowledge of this patient's plan of care.    Targeted DC Date:  8/13/2018     Medical    Patient Active Problem List    Diagnosis Date Noted   • HTN (hypertension) 07/24/2018   • High cholesterol 07/24/2018   • Pain 07/24/2018   • Bowel habit changes 07/24/2018   • Lumbar stenosis with neurogenic claudication 07/20/2018   • Arthralgia of knee, right 04/10/2017   • Essential hypertension 03/27/2017   • Family history of coronary artery disease 03/13/2017   • Articular cartilage disorder, pelvic region and thigh 12/05/2013   • Disability examination 05/09/2012     Results     ** No results found for the last 24 hours. **           Nursing  Diet/Nutrition:  Regular  Medication Administration:  Whole with Liquid Wash  % consumed at meals in last 24 hours:  Consumed 75%-100% of meals per documentation.  Meal/Snack Consumption for the past 24 hrs:   Oral Nutrition Oral Nutrition Supplement    07/24/18 1815 Dinner Between % Consumed       Snack schedule:  None  Appetite:  Good  Fluid Intake/Output in past 24 hours: In: 720 [P.O.:720]  Out: 950   Admit Weight:  Weight: 90.7 kg (200 lb)  Weight Last 7 Days   [90.7 kg (200 lb)] 90.7 kg (200 lb) (07/23 1800)    Pain Issues:    Location:  Back (07/24 2053)  Lower (07/24 2053)         Severity:  Moderate   Scheduled pain medications:  gabapentin (NEURONTIN)      PRN pain medications used in last 24 hours:  oxycodone immediate release (ROXICODONE)    Non Pharmacologic Interventions:  emotional support,  repositioned and rest  Effectiveness of pain management plan:  good=patient states acceptable comfort after interventions    Bowel:    Bowel Assist Initial Score:  3 - Moderate Assistance  Bowel Assist Current Score:  3 - Moderate Assistance  Bowl Accidents in last 7 days:     Last bowel movement: 07/24/18  Stool Description: Medium, Soft     Usual bowel pattern:  every other day  Scheduled bowel medications:  docusate sodium (COLACE) and senna-docusate (PERICOLACE or SENOKOT S)   PRN bowel medications used in last 24 hours:  None  Nursing Interventions:  Increased time, Scheduled medication, Supervision, Verbal cueing, Positioning on commode/toilet  Effectiveness of bowel program:   fair=sometimes needs prn bowel meds for constipation  Bladder:    Bladder Assist Initial Score:  3 - Moderate Assistance  Bladder Assist Current Score:  3 - Moderate Assistance  Bladder Accidents in last 7 days:     PVR range for past 24-48 hours:  [64]  ()  Medications affecting bladder:  None    Interventions:  Increased time, Supervision, Verbal cueing  Effectiveness of bladder training:  Good=regular, predictable, emptying of bladder, patient initiates time voiding      Wound:     Patient Lines/Drains/Airways Status    Active Current Wounds     Name: Placement date: Placement time: Site: Days:    Surgical Incision  Incision Back 07/17/18   1010      7    Wound Skin Tear Back Left Middle 07/20/18   0900    4                   Interventions: Monitor and assess for infection  Effectiveness of intervention:  wound is unchanged     Sleep/wake cycle:   Average hours slept:  Sleeps 4-6 hours without waking  Sleep medication usage:  None    Patient/Family Training/Education:  Bladder Management/Training, Bowel Management/Training, Diet/Nutrition, Fall Prevention, General Self Care, Medication Management, Pain Management, Respiratory Hygiene, Safety and Wound Care    Strengths: Alert and oriented, Willingly participates in therapeutic  activities, Supportive family and Manages pain appropriately   Barriers:   Fatigue, Generalized weakness and Limited mobility            Nursing Problems           Problem: Bowel/Gastric:     Goal: Normal bowel function is maintained or improved           Goal: Will not experience complications related to bowel motility             Problem: Communication     Goal: The ability to communicate needs accurately and effectively will improve             Problem: Discharge Barriers/Planning     Goal: Patient's continuum of care needs will be met             Problem: Infection     Goal: Will remain free from infection             Problem: Knowledge Deficit     Goal: Knowledge of disease process/condition, treatment plan, diagnostic tests, and medications will improve           Goal: Knowledge of the prescribed therapeutic regimen will improve             Problem: Mobility     Goal: Risk for activity intolerance will decrease             Problem: Pain Management     Goal: Pain level will decrease to patient's comfort goal             Problem: Respiratory:     Goal: Respiratory status will improve             Problem: Safety     Goal: Will remain free from injury           Goal: Will remain free from falls             Problem: Skin Integrity     Goal: Risk for impaired skin integrity will decrease             Problem: Venous Thromboembolism (VTW)/Deep Vein Thrombosis (DVT) Prevention:     Goal: Patient will participate in Venous Thrombosis (VTE)/Deep Vein Thrombosis (DVT)Prevention Measures                  Long Term Goals:   At discharge patient will be able to function safely at home and in the community with support.    Section completed by:  Blanche Peterson R.N.              Mobility  Bed mobility:   SBA  Bed /Chair/Wheelchair Transfer Initial:  3 - Moderate Assistance  Bed /Chair/Wheelchair Transfer Current:  4 - Minimal Assistance   Bed/Chair/Wheelchair Transfer Description:  Increased time, Supervision for  safety, Verbal cueing, Set-up of equipment, Initial preparation for task (CG assist for SPT to/from EOB sitting and manual wc)  Walk Initial:  1 - Total Assistance  Walk Current:  1 - Total Assistance   Walk Description:   (SBA 30feet with 4WW.)  Wheelchair Initial:  1 - Total Assistance  Wheelchair Current:  1 - Total Assistance   Wheelchair Description:   (modA 30feet with BLEs)  Stairs Initial:  2 - Max Assistance  Stairs Current: 2 - Max Assistance   Stairs Description:  (4 stairs with BHRs, CGA)  Patient/Family Training/Education:  Pt educated on PT role, PT POC, rehab orientation  DME/DC Recommendations:  TBD; pt has 4WW  Strengths:  Effective communication skills, Good insight into deficits/needs, Independent PLOF, Making steady progress towards goals, Motivated for self care and independence, Pleasant and cooperative, Supportive family and Willingly participates in therapeutic activities  Barriers:   Decreased endurance, Generalized weakness, Pain poorly managed, Poor activity tolerance and Poor balance     # of short term goals set= 5  # of short term goals met=0 (Pt just evaluated)         Physical Therapy Problems           Problem: Balance     Dates: Start: 07/24/18       Goal: STG-Within one week, patient will maintain static standing     Dates: Start: 07/24/18       Description: 1) Individualized goal:  Unsupported for 1 min with SPV  2) Interventions:  PT Gait Training, PT Therapeutic Exercises, PT Neuro Re-Ed/Balance and PT Therapeutic Activity               Problem: Mobility     Dates: Start: 07/24/18       Goal: STG-Within one week, patient will ambulate household distance     Dates: Start: 07/24/18       Description: 1) Individualized goal:  With 4WW SPV without increased pain  2) Interventions: PT Gait Training, PT Therapeutic Exercises, PT Neuro Re-Ed/Balance and PT Therapeutic Activity               Goal: STG-Within one week, patient will ambulate up/down a curb     Dates: Start: 07/24/18        Description: 1) Individualized goal:  With 4WW with CGA  2) Interventions:  PT Gait Training, PT Therapeutic Exercises, PT Neuro Re-Ed/Balance and PT Therapeutic Activity                 Problem: Mobility Transfers     Dates: Start: 07/24/18       Goal: STG-Within one week, patient will perform bed mobility     Dates: Start: 07/24/18       Description: 1) Individualized goal:  SPV with flat bed, maintaining spinal precautions  2) Interventions: PT Gait Training, PT Therapeutic Exercises, PT Neuro Re-Ed/Balance and PT Therapeutic Activity               Goal: STG-Within one week, patient will transfer bed to chair     Dates: Start: 07/24/18       Description: 1) Individualized goal:  With 4WW SPV  2) Interventions:  PT Gait Training, PT Therapeutic Exercises, PT Neuro Re-Ed/Balance and PT Therapeutic Activity                 Problem: PT-Long Term Goals     Dates: Start: 07/24/18       Goal: LTG-By discharge, patient will tolerate standing     Dates: Start: 07/24/18       Description: 1) Individualized goal:  5 min without increased pain  2) Interventions: PT Gait Training, PT Therapeutic Exercises, PT Neuro Re-Ed/Balance and PT Therapeutic Activity               Goal: LTG-By discharge, patient will ambulate     Dates: Start: 07/24/18       Description: 1) Individualized goal:  With 4WW 500' Lori  2) Interventions: PT Gait Training, PT Therapeutic Exercises, PT Neuro Re-Ed/Balance and PT Therapeutic Activity               Goal: LTG-By discharge, patient will transfer one surface to another     Dates: Start: 07/24/18       Description: 1) Individualized goal:  With 4WW Lori  2) Interventions:  PT Gait Training, PT Therapeutic Exercises, PT Neuro Re-Ed/Balance and PT Therapeutic Activity               Goal: LTG-By discharge, patient will transfer in/out of a car     Dates: Start: 07/24/18       Description: 1) Individualized goal:  With 4WW Lori, maintaining spinal precautions  2) Interventions:  PT Gait Training, PT  Therapeutic Exercises, PT Neuro Re-Ed/Balance and PT Therapeutic Activity               Goal: LTG-By discharge, patient will     Dates: Start: 07/24/18       Description: 1) Individualized goal:  Ascend/descend curb with 4WW SPV  2) Interventions:  PT Gait Training, PT Therapeutic Exercises, PT Neuro Re-Ed/Balance and PT Therapeutic Activity                       Section completed by:  POORNIMA RandolphT       Activities of Daily Living  Eating Initial:  7 - Independent  Eating Current:  6 - Modified Independent   Eating Description:  Insert dentures  Grooming Initial:  6 - Modified Independent  Grooming Current:  6 - Modified Independent   Grooming Description:  Increased time  Bathing Initial:  3 - Moderate Assistance  Bathing Current:  3 - Moderate Assistance   Bathing Description:  Adaptive equipment, Grab bar, Tub bench, Long handled bath tool, Hand held shower, Increased time, Supervision for safety, Verbal cueing, Set-up of equipment (Pt required assistance for lower legs/feet, back and natalee area for wash/rinse/dry)  Upper Body Dressing Initial:  4 - Minimal Assistance  Upper Body Dressing Current:  5 - Standby Prompting/Supervision or Set-up   Upper Body Dressing Description:   (Don/doff LSO seated/standing via manual wc/EOB sitting/standing)  Lower Body Dressing Initial:  1 - Total Assistance  Lower Body Dressing Current:  3 - Moderate Assistance   Lower Body Dressing Description:  3 - Moderate Assistance  Toileting Initial:  1 - Total Assistance  Toileting Current:  2 - Max Assistance   Toileting Description:  Grab bar, Increased time, Supervision for safety, Verbal cueing, Set-up of equipment, Initial preparation for task  Toilet Transfer Initial:  4 - Minimal Assistance  Toilet Transfer Current:  4 - Minimal Assistance   Toilet Transfer Description:  4 - Minimal Assistance  Tub / Shower Transfer Initial:  4 - Minimal Assistance  Tub / Shower Transfer Current:  4 - Minimal Assistance   Tub / Shower  Transfer Description:  Grab bar, Increased time, Supervision for safety, Verbal cueing, Set-up of equipment, Initial preparation for task  IADL:  TBD   Family Training/Education:  TBD   DME/DC Recommendations:  TBD     Strengths:  Independent PLOF, Motivated for self care and independence, Pleasant and cooperative and Willingly participates in therapeutic activities  Barriers:  Decreased endurance, Generalized weakness, Limited mobility and Poor balance     # of short term goals set= 5    # of short term goals met= 3          Occupational Therapy Goals           Problem: Bathing     Dates: Start: 07/24/18       Goal: STG-Within one week, patient will bathe     Dates: Start: 07/24/18       Description: 1) Individualized Goal:  Min assist w/ AE/DME PRN  2) Interventions:  OT Self Care/ADL, OT Neuro Re-Ed/Balance, OT Therapeutic Activity, OT Evaluation and OT Therapeutic Exercise       Note:     Goal Note filed on 07/25/18 1141 by Cali Murodck MS,OTR/L    Goal: STG-Within one week, patient will bathe  Outcome: NOT MET  Pt at Mod assist w/ AE/DME PRN secondary pain, back/spinal precautions,   gen weakness/endurance and impaired balance.                  Problem: Functional Transfers     Dates: Start: 07/24/18       Goal: STG-Within one week, patient will transfer to toilet     Dates: Start: 07/24/18       Description: 1) Individualized Goal: Sup/SBA w/ DME PRN  2) Interventions:   OT Self Care/ADL, OT Neuro Re-Ed/Balance, OT Therapeutic Activity, OT Evaluation and OT Therapeutic Exercise       Note:     Goal Note filed on 07/25/18 1141 by Cali Murdock MS,OTR/L    Goal: STG-Within one week, patient will transfer to toilet  Outcome: NOT MET  Pt at min assist via grab bar to/from manual wc and commode                  Problem: OT Long Term Goals     Dates: Start: 07/24/18       Goal: LTG-By discharge, patient will complete basic self care tasks     Dates: Start: 07/24/18       Description: 1) Individualized Goal:  Mod  Indep w/ AE/DME PRN  2) Interventions:   OT Self Care/ADL, OT Neuro Re-Ed/Balance, OT Therapeutic Activity, OT Evaluation and OT Therapeutic Exercise             Goal: LTG-By discharge, patient will perform bathroom transfers     Dates: Start: 07/24/18       Description: 1) Individualized Goal: Mod Indep w/ DME PRN  2) Interventions:   OT Self Care/ADL, OT Neuro Re-Ed/Balance, OT Therapeutic Activity, OT Evaluation and OT Therapeutic Exercise                   Section completed by:  Cali Murdock MS,OTR/L             REHAB-Pharmacy IDT Team Note by Renu Garcia RPH at 7/24/2018 11:27 AM  Version 1 of 1    Author:  Renu Garcia RPH Service:  (none) Author Type:  Pharmacist    Filed:  7/24/2018 11:28 AM Date of Service:  7/24/2018 11:27 AM Status:  Signed    :  Renu Garcia RPH (Pharmacist)         Pharmacy   Pharmacy  Antibiotics/Antifungals/Antivirals:  Medication:      Active Orders     None        Route:         n/a  Stop Date:  n/a  Reason:   Antihypertensives/Cardiac:  Medication:    Orders (72h ago through future)    Start     Ordered    07/24/18 0900  hydroCHLOROthiazide (HYDRODIURIL) tablet 25 mg  DAILY      07/23/18 1753    07/24/18 0900  lisinopril (PRINIVIL) tablet 20 mg  DAILY      07/23/18 1753    07/24/18 0600  DILTIAZem CD (CARDIZEM CD) capsule 240 mg  Q DAY     Comments:  This medication has been substituted for Tiaztia XT per P&T Therapeutic Substitution Protocol.    07/23/18 1753    07/23/18 1753  hydrALAZINE (APRESOLINE) tablet 25 mg  EVERY 8 HOURS PRN      07/23/18 1753        Patient Vitals for the past 24 hrs:   BP Pulse   07/24/18 0700 110/53 (!) 56   07/24/18 0525 107/66 76   07/23/18 1920 114/72 86   07/23/18 1800 132/88 96       Anticoagulation:  Medication:  Lovenox  INR:      Other key medications: gabapentin, pregabalin, tizanidine  A review of the medication list reveals no issues at this time. Patient is currently on antihypertensive(s). Recommend home blood pressure  monitoring/recording if antihypertensive(s) regimen(s) continue.  Section completed by:  Renu Garcia [facility][[email or web address]]        Attribution Key     TK.1 - Renu Garcia [facility] on 7/24/2018 11:27 AM                    DC Planning  DC destination/dispostion:  To return to her one story handicap assessable apartment where she lives with her ex- in [place], [place]     Referrals: will need f/u with pcp. Dr. Pfeiffer and Dr. Perez, neurosurgery.     DC Needs: has 4ws, spc and bs commode at home, states her sister has a hospital bed that she can use when she goes home    Barriers to discharge:  Lives with ex- who has dementia    Strengths: Has her sister, Elaine who helps her and helps with her ex- also.     Section completed by:  Gabriella Cook R.N.      Physician Summary  Coni Hope MD participated and led team conference discussion.

## 2018-07-25 NOTE — CARE PLAN
Problem: Safety  Goal: Will remain free from falls    Intervention: Implement fall precautions  Use of call light encouraged to make needs known.Bed in low position, non skid socks/shoes on when out of bed.Call light within reach.Will continue to monitor and assess needs and safety.      Problem: Pain Management  Goal: Pain level will decrease to patient's comfort goal    Intervention: Follow pain managment plan developed in collaboration with patient and Interdisciplinary Team  Medicated per request for pain with good effect.Repositioned with pillows for comfort.Will continue to monitor and assess pain level and medicate as needed.

## 2018-07-25 NOTE — THERAPY
Physical Therapy Initial Plan of Care Note    1) Assessment: Patient is 53 y.o. female with a diagnosis of lumbar laminectomy and arthrodesis L2-5.  Additional factors influencing patient status / progress (ie: cognitive factors, co-morbidities, social support, etc): questionable support from boyfriend, ex  unable to assist at home, B foot drop, PMH including HTN, HLD, OA, B TKAs, .  Long term and short term goals have been discussed with patient and they are in agreement.  2) Plan: Recommend Physical Therapy  minutes per day 5-7 days per week for 2 weeks for the following treatments:  PT E Stim Attended, PT Gait Training, PT Therapeutic Exercises, PT TENS Application, PT Neuro Re-Ed/Balance, PT Therapeutic Activity, PT Manual Therapy and PT Evaluation.  3) Goals:  Please refer to care plan for goals.

## 2018-07-25 NOTE — PROGRESS NOTES
"Rehab Progress Note     Encounter Date: 7/25/2018    CC: Decreased function after L2-L5 decompression and laminectomies    Interval Events (Subjective)  Patient sitting up in bed before therapy. She reports she is doing well. She reports she slept well. She continues to have leg pain but PRN medications are covering. She reports she is looking forward to finding out how long she can stay as she feels she is already making progress. Discussed about bowel and bladder. She reports she already has increased feeling in bladder although she was incontinent at least once. Denies SOB or cough.     IDT Team Meeting 7/25/2018    Coni CARCAMO M.D., was present and led the interdisciplinary team conference on 7/25/2018.       RN:  Diet    % Meal     Pain Tylenol; Oxycodone x3   Sleep    Bowel Continent   Bladder Incontinent   In's & Out's    Staples look OK    PT:  Bed Mobility Andrés   Transfers modA   Mobility maxA/totA   Stairs maxA   Barrier: pain and weakness  May go home with mom  Needs cues on precautions    OT:  Eating    Grooming    Bathing modA   UB Dressing SBA   LB Dressing modA   Toileting maxA   Shower & Transfer    Generalized weakness and limited sensation  Limited by endurance    CM:  Continues to work on disposition and DME needs.      DC/Disposition:  8/13/18    Objective:  VITAL SIGNS: BP (!) 98/61   Pulse 70   Temp 36.6 °C (97.9 °F)   Resp 20   Ht 1.651 m (5' 5\")   Wt 90.7 kg (200 lb)   LMP 12/01/2013   SpO2 95%   BMI 33.28 kg/m²   Gen: NAD  Psych: Mood and affect appropriate  CV: RRR, no edema  Resp: CTAB, no upper airway sounds  Abd: NTND  Neuro: AOx4, Bilateral foot drop, 3/5 on R PF vs 2/5 L PF    Recent Results (from the past 72 hour(s))   CBC with Differential    Collection Time: 07/24/18  5:45 AM   Result Value Ref Range    WBC 7.6 4.8 - 10.8 K/uL    RBC 3.78 (L) 4.20 - 5.40 M/uL    Hemoglobin 11.6 (L) 12.0 - 16.0 g/dL    Hematocrit 33.5 (L) 37.0 - 47.0 %    MCV 88.6 81.4 - 97.8 " fL    MCH 30.7 27.0 - 33.0 pg    MCHC 34.6 33.6 - 35.0 g/dL    RDW 39.7 35.9 - 50.0 fL    Platelet Count 312 164 - 446 K/uL    MPV 10.4 9.0 - 12.9 fL    Neutrophils-Polys 58.30 44.00 - 72.00 %    Lymphocytes 25.60 22.00 - 41.00 %    Monocytes 9.10 0.00 - 13.40 %    Eosinophils 5.70 0.00 - 6.90 %    Basophils 0.40 0.00 - 1.80 %    Immature Granulocytes 0.90 0.00 - 0.90 %    Nucleated RBC 0.00 /100 WBC    Neutrophils (Absolute) 4.42 2.00 - 7.15 K/uL    Lymphs (Absolute) 1.94 1.00 - 4.80 K/uL    Monos (Absolute) 0.69 0.00 - 0.85 K/uL    Eos (Absolute) 0.43 0.00 - 0.51 K/uL    Baso (Absolute) 0.03 0.00 - 0.12 K/uL    Immature Granulocytes (abs) 0.07 0.00 - 0.11 K/uL    NRBC (Absolute) 0.00 K/uL   Comp Metabolic Panel (CMP)    Collection Time: 07/24/18  5:45 AM   Result Value Ref Range    Sodium 134 (L) 135 - 145 mmol/L    Potassium 3.6 3.6 - 5.5 mmol/L    Chloride 96 96 - 112 mmol/L    Co2 28 20 - 33 mmol/L    Anion Gap 10.0 0.0 - 11.9    Glucose 111 (H) 65 - 99 mg/dL    Bun 17 8 - 22 mg/dL    Creatinine 0.82 0.50 - 1.40 mg/dL    Calcium 9.0 8.5 - 10.5 mg/dL    AST(SGOT) 20 12 - 45 U/L    ALT(SGPT) 17 2 - 50 U/L    Alkaline Phosphatase 59 30 - 99 U/L    Total Bilirubin 0.5 0.1 - 1.5 mg/dL    Albumin 3.3 3.2 - 4.9 g/dL    Total Protein 6.5 6.0 - 8.2 g/dL    Globulin 3.2 1.9 - 3.5 g/dL    A-G Ratio 1.0 g/dL   HEMOGLOBIN A1C    Collection Time: 07/24/18  5:45 AM   Result Value Ref Range    Glycohemoglobin 5.8 (H) 0.0 - 5.6 %    Est Avg Glucose 120 mg/dL   TSH with Reflex to FT4    Collection Time: 07/24/18  5:45 AM   Result Value Ref Range    TSH 2.160 0.380 - 5.330 uIU/mL   Vitamin D, 25-hydroxy (blood)    Collection Time: 07/24/18  5:45 AM   Result Value Ref Range    25-Hydroxy   Vitamin D 25 49 30 - 100 ng/mL   ESTIMATED GFR    Collection Time: 07/24/18  5:45 AM   Result Value Ref Range    GFR If African American >60 >60 mL/min/1.73 m 2    GFR If Non African American >60 >60 mL/min/1.73 m 2       Current  Facility-Administered Medications   Medication Frequency   • omeprazole (PRILOSEC) capsule 20 mg DAILY   • senna-docusate (PERICOLACE or SENOKOT S) 8.6-50 MG per tablet 2 Tab BID PRN    And   • polyethylene glycol/lytes (MIRALAX) PACKET 1 Packet QDAY PRN    And   • magnesium hydroxide (MILK OF MAGNESIA) suspension 30 mL QDAY PRN    And   • bisacodyl (DULCOLAX) suppository 10 mg QDAY PRN   • senna-docusate (PERICOLACE or SENOKOT S) 8.6-50 MG per tablet 1 Tab BID   • DILTIAZem CD (CARDIZEM CD) capsule 240 mg DAILY   • Respiratory Care per Protocol Continuous RT   • Pharmacy Consult Request ...Pain Management Review 1 Each PRN   • oxyCODONE immediate release (ROXICODONE) tablet 10 mg Q3HRS PRN   • oxyCODONE immediate-release (ROXICODONE) tablet 15 mg Q3HRS PRN   • acetaminophen (TYLENOL) tablet 650 mg Q4HRS PRN   • artificial tears 1.4 % ophthalmic solution 1 Drop PRN   • benzocaine-menthol (CEPACOL) lozenge 1 Lozenge Q2HRS PRN   • hydrALAZINE (APRESOLINE) tablet 25 mg Q8HRS PRN   • mag hydrox-al hydrox-simeth (MAALOX PLUS ES or MYLANTA DS) suspension 20 mL Q2HRS PRN   • ondansetron (ZOFRAN ODT) dispertab 4 mg 4X/DAY PRN    Or   • ondansetron (ZOFRAN) syringe/vial injection 4 mg 4X/DAY PRN   • traZODone (DESYREL) tablet 50 mg QHS PRN   • sodium chloride (OCEAN) 0.65 % nasal spray 2 Spray PRN   • benzocaine-menthol (CEPACOL) lozenge 1 Lozenge Q2HRS PRN   • calcium carbonate (TUMS) chewable tab 500 mg BID   • docusate sodium (COLACE) capsule 100 mg BID   • tizanidine (ZANAFLEX) tablet 2 mg TID PRN   • bacitracin-polymyxin b (POLYSPORIN) 500-50566 UNIT/GM ointment BID   • enoxaparin (LOVENOX) inj 40 mg QHS   • gabapentin (NEURONTIN) capsule 600 mg TID   • hydroCHLOROthiazide (HYDRODIURIL) tablet 25 mg DAILY   • lisinopril (PRINIVIL) tablet 20 mg DAILY   • pregabalin (LYRICA) capsule 300 mg DAILY   • vitamin D (cholecalciferol) tablet 2,000 Units DAILY       Orders Placed This Encounter   Procedures   • Diet Order Regular      Standing Status:   Standing     Number of Occurrences:   1     Order Specific Question:   Diet:     Answer:   Regular [1]       Assessment:  Active Hospital Problems    Diagnosis   • *Lumbar stenosis with neurogenic claudication   • HTN (hypertension)   • High cholesterol   • Pain   • Bowel habit changes       Medical Decision Making and Plan:  Severe lumbar stenosis w cauda equina - Patient with pre-operative bowel/bladder changes as well as decreased sensation and bilateral foot drop suggestive of cauda equina.  Patient is s/p L2-L5 decompression and posterior fusion with Dr. Perez on 7/18/18.  Since surgery patient has improved with bowel/bladder but continues to have weakness and decreased sensation.  -LSO when OOB with spinal precautions  -PT and OT to evaluate and treat mobility and ADLs.  -Follow-up with Dr. Perez' clinic 2 weeks and 6 weeks post-discharge     Neurogenic Bowel/Bladder - Patient with constipation and urinary incontinence prior to surgery. Appears to be improved but will need to monitor.    -Continue scheduled Senna-Docusate.  -Will need to check PVRs. Nursing did not check due to incontinence.  Discussed with patient and nursing about neurogenic bladder including need to check PVRs.       Pain - Patient with both neuropathic and post-operative pain.  Discussed need to reduce opiate use.  Currently on Gabapentin 600 mg TID and Lyrica 300 mg daily.  Oxycodone and Tylenol PRN.      HTN/CV - Patient on HCTZ 25 mg daily, Lisinopril 20 mg daily, Diltiazem  mg daily     Vitamin D - On supplementation, admission labs within normal limits. Continue 2000 daily     GI Ppx - Patient on prilosec      DVT Ppx - Patient on Lovenox on transfer. Will monitor for ambulation.     Total time:  35 minutes.  I spent greater than 50% of the time for patient care and coordination on this date, including unit/floor time, and face-to-face time with the patient as per assessment and plan above. Discussion  included neurogenic bowel, neurogenic bladder and discharge planning. Patient was discussed separately in IDT today; please see details above.    Coni Hope M.D.

## 2018-07-25 NOTE — CARE PLAN
Problem: Bathing  Goal: STG-Within one week, patient will bathe  1) Individualized Goal:  Min assist w/ AE/DME PRN  2) Interventions:  OT Self Care/ADL, OT Neuro Re-Ed/Balance, OT Therapeutic Activity, OT Evaluation and OT Therapeutic Exercise     Outcome: NOT MET  Pt at Mod assist w/ AE/DME PRN secondary pain, back/spinal precautions, gen weakness/endurance and impaired balance.    Problem: Dressing  Goal: STG-Within one week, patient will dress UB  1) Individualized Goal:  Sup/SBA for pull over shirt and LSO  2) Interventions:   OT Self Care/ADL, OT Neuro Re-Ed/Balance, OT Therapeutic Activity, OT Evaluation and OT Therapeutic Exercise     Outcome: MET Date Met: 07/25/18  Pt at Mod Indep to manage shirts.  Total assist for LSO- (FIM defaults to Sup/SBA)  Goal: STG-Within one week, patient will dress LB  1) Individualized Goal: Max assist w/ AE/DME PRN  2) Interventions:  OT Self Care/ADL, OT Neuro Re-Ed/Balance, OT Therapeutic Activity, OT Evaluation and OT Therapeutic Exercise     Outcome: MET Date Met: 07/25/18  Pt at Mod assist w/ AE/DME for LB dressing while observing back/spinal precautions.    Problem: Toileting  Goal: STG-Within one week, patient will complete toileting tasks  1) Individualized Goal:  Max assist w/ AE/DME PRN  2) Interventions:   OT Self Care/ADL, OT Neuro Re-Ed/Balance, OT Therapeutic Activity, OT Evaluation and OT Therapeutic Exercise     Outcome: MET Date Met: 07/25/18  Pt at Max assist w/ DME for natalee hygiene and don LB clothing post toileting via grab bar    Problem: Functional Transfers  Goal: STG-Within one week, patient will transfer to toilet  1) Individualized Goal: Sup/SBA w/ DME PRN  2) Interventions:   OT Self Care/ADL, OT Neuro Re-Ed/Balance, OT Therapeutic Activity, OT Evaluation and OT Therapeutic Exercise     Outcome: NOT MET  Pt at min assist via grab bar to/from manual wc and commode

## 2018-07-25 NOTE — REHAB-NURSING IDT TEAM NOTE
Nursing   Nursing  Diet/Nutrition:  Regular  Medication Administration:  Whole with Liquid Wash  % consumed at meals in last 24 hours:  Consumed 75%-100% of meals per documentation.  Meal/Snack Consumption for the past 24 hrs:   Oral Nutrition Oral Nutrition Supplement    07/24/18 1815 Dinner Between % Consumed       Snack schedule:  None  Appetite:  Good  Fluid Intake/Output in past 24 hours: In: 720 [P.O.:720]  Out: 950   Admit Weight:  Weight: 90.7 kg (200 lb)  Weight Last 7 Days   [90.7 kg (200 lb)] 90.7 kg (200 lb) (07/23 1800)    Pain Issues:    Location:  Back (07/24 2053)  Lower (07/24 2053)         Severity:  Moderate   Scheduled pain medications:  gabapentin (NEURONTIN)      PRN pain medications used in last 24 hours:  oxycodone immediate release (ROXICODONE)    Non Pharmacologic Interventions:  emotional support, repositioned and rest  Effectiveness of pain management plan:  good=patient states acceptable comfort after interventions    Bowel:    Bowel Assist Initial Score:  3 - Moderate Assistance  Bowel Assist Current Score:  3 - Moderate Assistance  Bowl Accidents in last 7 days:     Last bowel movement: 07/24/18  Stool Description: Medium, Soft     Usual bowel pattern:  every other day  Scheduled bowel medications:  docusate sodium (COLACE) and senna-docusate (PERICOLACE or SENOKOT S)   PRN bowel medications used in last 24 hours:  None  Nursing Interventions:  Increased time, Scheduled medication, Supervision, Verbal cueing, Positioning on commode/toilet  Effectiveness of bowel program:   fair=sometimes needs prn bowel meds for constipation  Bladder:    Bladder Assist Initial Score:  3 - Moderate Assistance  Bladder Assist Current Score:  3 - Moderate Assistance  Bladder Accidents in last 7 days:     PVR range for past 24-48 hours:  [64]  ()  Medications affecting bladder:  None    Interventions:  Increased time, Supervision, Verbal cueing  Effectiveness of bladder training:  Good=regular,  predictable, emptying of bladder, patient initiates time voiding      Wound:     Patient Lines/Drains/Airways Status    Active Current Wounds     Name: Placement date: Placement time: Site: Days:    Surgical Incision  Incision Back 07/17/18   1010      7    Wound Skin Tear Back Left Middle 07/20/18   0900    4                   Interventions: Monitor and assess for infection  Effectiveness of intervention:  wound is unchanged     Sleep/wake cycle:   Average hours slept:  Sleeps 4-6 hours without waking  Sleep medication usage:  None    Patient/Family Training/Education:  Bladder Management/Training, Bowel Management/Training, Diet/Nutrition, Fall Prevention, General Self Care, Medication Management, Pain Management, Respiratory Hygiene, Safety and Wound Care    Strengths: Alert and oriented, Willingly participates in therapeutic activities, Supportive family and Manages pain appropriately   Barriers:   Fatigue, Generalized weakness and Limited mobility            Nursing Problems           Problem: Bowel/Gastric:     Goal: Normal bowel function is maintained or improved           Goal: Will not experience complications related to bowel motility             Problem: Communication     Goal: The ability to communicate needs accurately and effectively will improve             Problem: Discharge Barriers/Planning     Goal: Patient's continuum of care needs will be met             Problem: Infection     Goal: Will remain free from infection             Problem: Knowledge Deficit     Goal: Knowledge of disease process/condition, treatment plan, diagnostic tests, and medications will improve           Goal: Knowledge of the prescribed therapeutic regimen will improve             Problem: Mobility     Goal: Risk for activity intolerance will decrease             Problem: Pain Management     Goal: Pain level will decrease to patient's comfort goal             Problem: Respiratory:     Goal: Respiratory status will improve              Problem: Safety     Goal: Will remain free from injury           Goal: Will remain free from falls             Problem: Skin Integrity     Goal: Risk for impaired skin integrity will decrease             Problem: Venous Thromboembolism (VTW)/Deep Vein Thrombosis (DVT) Prevention:     Goal: Patient will participate in Venous Thrombosis (VTE)/Deep Vein Thrombosis (DVT)Prevention Measures                  Long Term Goals:   At discharge patient will be able to function safely at home and in the community with support.    Section completed by:  Blanche Peterson R.N.

## 2018-07-26 PROCEDURE — 97116 GAIT TRAINING THERAPY: CPT

## 2018-07-26 PROCEDURE — 97535 SELF CARE MNGMENT TRAINING: CPT

## 2018-07-26 PROCEDURE — 94760 N-INVAS EAR/PLS OXIMETRY 1: CPT

## 2018-07-26 PROCEDURE — 97110 THERAPEUTIC EXERCISES: CPT

## 2018-07-26 PROCEDURE — 99232 SBSQ HOSP IP/OBS MODERATE 35: CPT | Performed by: PHYSICAL MEDICINE & REHABILITATION

## 2018-07-26 PROCEDURE — 700102 HCHG RX REV CODE 250 W/ 637 OVERRIDE(OP): Performed by: PHYSICAL MEDICINE & REHABILITATION

## 2018-07-26 PROCEDURE — 700101 HCHG RX REV CODE 250: Performed by: PHYSICAL MEDICINE & REHABILITATION

## 2018-07-26 PROCEDURE — 97530 THERAPEUTIC ACTIVITIES: CPT

## 2018-07-26 PROCEDURE — 770010 HCHG ROOM/CARE - REHAB SEMI PRIVAT*

## 2018-07-26 PROCEDURE — A9270 NON-COVERED ITEM OR SERVICE: HCPCS | Performed by: PHYSICAL MEDICINE & REHABILITATION

## 2018-07-26 PROCEDURE — 700112 HCHG RX REV CODE 229: Performed by: PHYSICAL MEDICINE & REHABILITATION

## 2018-07-26 PROCEDURE — 700111 HCHG RX REV CODE 636 W/ 250 OVERRIDE (IP): Performed by: PHYSICAL MEDICINE & REHABILITATION

## 2018-07-26 RX ADMIN — OXYCODONE HYDROCHLORIDE 10 MG: 10 TABLET ORAL at 04:09

## 2018-07-26 RX ADMIN — SENNOSIDES AND DOCUSATE SODIUM 1 TABLET: 8.6; 5 TABLET ORAL at 21:22

## 2018-07-26 RX ADMIN — DILTIAZEM HYDROCHLORIDE 240 MG: 120 CAPSULE, COATED, EXTENDED RELEASE ORAL at 08:45

## 2018-07-26 RX ADMIN — CALCIUM CARBONATE (ANTACID) CHEW TAB 500 MG 500 MG: 500 CHEW TAB at 08:44

## 2018-07-26 RX ADMIN — PREGABALIN 300 MG: 100 CAPSULE ORAL at 08:46

## 2018-07-26 RX ADMIN — GABAPENTIN 600 MG: 300 CAPSULE ORAL at 21:22

## 2018-07-26 RX ADMIN — CALCIUM CARBONATE (ANTACID) CHEW TAB 500 MG 500 MG: 500 CHEW TAB at 21:22

## 2018-07-26 RX ADMIN — OXYCODONE HYDROCHLORIDE 10 MG: 10 TABLET ORAL at 17:23

## 2018-07-26 RX ADMIN — OXYCODONE HYDROCHLORIDE 10 MG: 10 TABLET ORAL at 08:50

## 2018-07-26 RX ADMIN — OMEPRAZOLE 20 MG: 20 CAPSULE, DELAYED RELEASE ORAL at 08:46

## 2018-07-26 RX ADMIN — VITAMIN D, TAB 1000IU (100/BT) 2000 UNITS: 25 TAB at 08:44

## 2018-07-26 RX ADMIN — GABAPENTIN 600 MG: 300 CAPSULE ORAL at 14:52

## 2018-07-26 RX ADMIN — BACITRACIN ZINC AND POLYMYXIN B SULFATE: at 08:42

## 2018-07-26 RX ADMIN — LISINOPRIL 20 MG: 20 TABLET ORAL at 08:45

## 2018-07-26 RX ADMIN — SENNOSIDES AND DOCUSATE SODIUM 1 TABLET: 8.6; 5 TABLET ORAL at 08:45

## 2018-07-26 RX ADMIN — DOCUSATE SODIUM 100 MG: 100 CAPSULE, LIQUID FILLED ORAL at 21:22

## 2018-07-26 RX ADMIN — TIZANIDINE 2 MG: 4 TABLET ORAL at 21:32

## 2018-07-26 RX ADMIN — HYDROCHLOROTHIAZIDE 25 MG: 25 TABLET ORAL at 08:46

## 2018-07-26 RX ADMIN — BACITRACIN ZINC AND POLYMYXIN B SULFATE: at 21:22

## 2018-07-26 RX ADMIN — OXYCODONE HYDROCHLORIDE 10 MG: 10 TABLET ORAL at 21:21

## 2018-07-26 RX ADMIN — ENOXAPARIN SODIUM 40 MG: 100 INJECTION SUBCUTANEOUS at 21:21

## 2018-07-26 RX ADMIN — GABAPENTIN 600 MG: 300 CAPSULE ORAL at 08:46

## 2018-07-26 RX ADMIN — OXYCODONE HYDROCHLORIDE 10 MG: 10 TABLET ORAL at 12:27

## 2018-07-26 RX ADMIN — DOCUSATE SODIUM 100 MG: 100 CAPSULE, LIQUID FILLED ORAL at 08:45

## 2018-07-26 ASSESSMENT — PAIN SCALES - GENERAL
PAINLEVEL_OUTOF10: 5
PAINLEVEL_OUTOF10: 6
PAINLEVEL_OUTOF10: 5
PAINLEVEL_OUTOF10: 0
PAINLEVEL_OUTOF10: 6
PAINLEVEL_OUTOF10: 5

## 2018-07-26 ASSESSMENT — GAIT ASSESSMENTS
GAIT LEVEL OF ASSIST: CONTACT GUARD ASSIST
ASSISTIVE DEVICE: FRONT WHEEL WALKER

## 2018-07-26 NOTE — DISCHARGE PLANNING
DARCI/MEGAN met with patient and her dialloanceeAntonia to report results of Team Conference today and targeted discharge date of 8/13/18. Team suggesting AFO before discharge and patient requiring cuing for spinal precautions. Patient states that she has initiated Medicaid PCA services with [employer] for when she discharges home.

## 2018-07-26 NOTE — CARE PLAN
Problem: Safety  Goal: Will remain free from injury  Outcome: PROGRESSING AS EXPECTED  Patient demonstrates good safety technique this shift.  Asks for assistance when needed and does not attempt self transfer. Pt also able to don and doff LSO.  Able to verbalize needs.      Problem: Skin Integrity  Goal: Risk for impaired skin integrity will decrease  Patient's skin remains intact and free from new or accidental injury this shift.  Patient remains free from s/s infection; afebrile.

## 2018-07-26 NOTE — FLOWSHEET NOTE
07/26/18 0756   Events/Summary/Plan   Events/Summary/Plan oxygen discontinued, pt's SATS staying above 92%., reminded to do deep breathing   Respiratory WDL   Respiratory (WDL) X   Chest Exam   Respiration 18   Pulse 70   Oximetry   #Pulse Oximetry (Single Determination) Yes   Oxygen   Home O2 Use Prior To Admission? No   Pulse Oximetry 95 %   O2 Daily Delivery Respiratory  Room Air with O2 Available   Room Air Challenge Pass

## 2018-07-26 NOTE — PROGRESS NOTES
"Rehab Progress Note     Encounter Date: 7/26/2018    CC: Decreased function after L2-L5 decompression and laminectomies    Interval Events (Subjective)  Patient seen in therapy gym. Patient's wheelchair has been exchanged as it was causing pain. Pain improved in current chair.  Discussed maybe lower to the ground chair. Therapy will look into it. Otherwise she reports continued improvement in sensation for bladder.  Denies pain at this time; usually shooting into legs.     IDT Team Meeting 7/25/2018  DC/Disposition:  8/13/18    Objective:  VITAL SIGNS: /63   Pulse 70   Temp 36.7 °C (98 °F)   Resp 18   Ht 1.651 m (5' 5\")   Wt 90.7 kg (200 lb)   LMP 12/01/2013   SpO2 95%   BMI 33.28 kg/m²   Gen: NAD  Psych: Mood and affect appropriate  CV: RRR, no edema  Resp: CTAB, no upper airway sounds  Abd: NTND  Neuro: AOx4, Bilateral foot drop, 3/5 on R PF vs 2/5 L PF  Unchanged from 7/25/18    Recent Results (from the past 72 hour(s))   CBC with Differential    Collection Time: 07/24/18  5:45 AM   Result Value Ref Range    WBC 7.6 4.8 - 10.8 K/uL    RBC 3.78 (L) 4.20 - 5.40 M/uL    Hemoglobin 11.6 (L) 12.0 - 16.0 g/dL    Hematocrit 33.5 (L) 37.0 - 47.0 %    MCV 88.6 81.4 - 97.8 fL    MCH 30.7 27.0 - 33.0 pg    MCHC 34.6 33.6 - 35.0 g/dL    RDW 39.7 35.9 - 50.0 fL    Platelet Count 312 164 - 446 K/uL    MPV 10.4 9.0 - 12.9 fL    Neutrophils-Polys 58.30 44.00 - 72.00 %    Lymphocytes 25.60 22.00 - 41.00 %    Monocytes 9.10 0.00 - 13.40 %    Eosinophils 5.70 0.00 - 6.90 %    Basophils 0.40 0.00 - 1.80 %    Immature Granulocytes 0.90 0.00 - 0.90 %    Nucleated RBC 0.00 /100 WBC    Neutrophils (Absolute) 4.42 2.00 - 7.15 K/uL    Lymphs (Absolute) 1.94 1.00 - 4.80 K/uL    Monos (Absolute) 0.69 0.00 - 0.85 K/uL    Eos (Absolute) 0.43 0.00 - 0.51 K/uL    Baso (Absolute) 0.03 0.00 - 0.12 K/uL    Immature Granulocytes (abs) 0.07 0.00 - 0.11 K/uL    NRBC (Absolute) 0.00 K/uL   Comp Metabolic Panel (CMP)    Collection Time: " 07/24/18  5:45 AM   Result Value Ref Range    Sodium 134 (L) 135 - 145 mmol/L    Potassium 3.6 3.6 - 5.5 mmol/L    Chloride 96 96 - 112 mmol/L    Co2 28 20 - 33 mmol/L    Anion Gap 10.0 0.0 - 11.9    Glucose 111 (H) 65 - 99 mg/dL    Bun 17 8 - 22 mg/dL    Creatinine 0.82 0.50 - 1.40 mg/dL    Calcium 9.0 8.5 - 10.5 mg/dL    AST(SGOT) 20 12 - 45 U/L    ALT(SGPT) 17 2 - 50 U/L    Alkaline Phosphatase 59 30 - 99 U/L    Total Bilirubin 0.5 0.1 - 1.5 mg/dL    Albumin 3.3 3.2 - 4.9 g/dL    Total Protein 6.5 6.0 - 8.2 g/dL    Globulin 3.2 1.9 - 3.5 g/dL    A-G Ratio 1.0 g/dL   HEMOGLOBIN A1C    Collection Time: 07/24/18  5:45 AM   Result Value Ref Range    Glycohemoglobin 5.8 (H) 0.0 - 5.6 %    Est Avg Glucose 120 mg/dL   TSH with Reflex to FT4    Collection Time: 07/24/18  5:45 AM   Result Value Ref Range    TSH 2.160 0.380 - 5.330 uIU/mL   Vitamin D, 25-hydroxy (blood)    Collection Time: 07/24/18  5:45 AM   Result Value Ref Range    25-Hydroxy   Vitamin D 25 49 30 - 100 ng/mL   ESTIMATED GFR    Collection Time: 07/24/18  5:45 AM   Result Value Ref Range    GFR If African American >60 >60 mL/min/1.73 m 2    GFR If Non African American >60 >60 mL/min/1.73 m 2       Current Facility-Administered Medications   Medication Frequency   • omeprazole (PRILOSEC) capsule 20 mg DAILY   • senna-docusate (PERICOLACE or SENOKOT S) 8.6-50 MG per tablet 2 Tab BID PRN    And   • polyethylene glycol/lytes (MIRALAX) PACKET 1 Packet QDAY PRN    And   • magnesium hydroxide (MILK OF MAGNESIA) suspension 30 mL QDAY PRN    And   • bisacodyl (DULCOLAX) suppository 10 mg QDAY PRN   • senna-docusate (PERICOLACE or SENOKOT S) 8.6-50 MG per tablet 1 Tab BID   • DILTIAZem CD (CARDIZEM CD) capsule 240 mg DAILY   • Respiratory Care per Protocol Continuous RT   • Pharmacy Consult Request ...Pain Management Review 1 Each PRN   • oxyCODONE immediate release (ROXICODONE) tablet 10 mg Q3HRS PRN   • oxyCODONE immediate-release (ROXICODONE) tablet 15 mg Q3HRS  PRN   • acetaminophen (TYLENOL) tablet 650 mg Q4HRS PRN   • artificial tears 1.4 % ophthalmic solution 1 Drop PRN   • benzocaine-menthol (CEPACOL) lozenge 1 Lozenge Q2HRS PRN   • hydrALAZINE (APRESOLINE) tablet 25 mg Q8HRS PRN   • mag hydrox-al hydrox-simeth (MAALOX PLUS ES or MYLANTA DS) suspension 20 mL Q2HRS PRN   • ondansetron (ZOFRAN ODT) dispertab 4 mg 4X/DAY PRN    Or   • ondansetron (ZOFRAN) syringe/vial injection 4 mg 4X/DAY PRN   • traZODone (DESYREL) tablet 50 mg QHS PRN   • sodium chloride (OCEAN) 0.65 % nasal spray 2 Spray PRN   • benzocaine-menthol (CEPACOL) lozenge 1 Lozenge Q2HRS PRN   • calcium carbonate (TUMS) chewable tab 500 mg BID   • docusate sodium (COLACE) capsule 100 mg BID   • tizanidine (ZANAFLEX) tablet 2 mg TID PRN   • bacitracin-polymyxin b (POLYSPORIN) 500-69694 UNIT/GM ointment BID   • enoxaparin (LOVENOX) inj 40 mg QHS   • gabapentin (NEURONTIN) capsule 600 mg TID   • hydroCHLOROthiazide (HYDRODIURIL) tablet 25 mg DAILY   • lisinopril (PRINIVIL) tablet 20 mg DAILY   • pregabalin (LYRICA) capsule 300 mg DAILY   • vitamin D (cholecalciferol) tablet 2,000 Units DAILY       Orders Placed This Encounter   Procedures   • Diet Order Regular     Standing Status:   Standing     Number of Occurrences:   1     Order Specific Question:   Diet:     Answer:   Regular [1]       Assessment:  Active Hospital Problems    Diagnosis   • *Lumbar stenosis with neurogenic claudication   • HTN (hypertension)   • High cholesterol   • Pain   • Bowel habit changes       Medical Decision Making and Plan:  Severe lumbar stenosis w cauda equina - Patient with pre-operative bowel/bladder changes as well as decreased sensation and bilateral foot drop suggestive of cauda equina.  Patient is s/p L2-L5 decompression and posterior fusion with Dr. ePrez on 7/18/18.  Since surgery patient has improved with bowel/bladder but continues to have weakness and decreased sensation.  -LSO when OOB with spinal precautions  -PT  and OT to evaluate and treat mobility and ADLs.  -Follow-up with Dr. Perez' clinic 2 weeks and 6 weeks post-discharge     Neurogenic Bowel/Bladder - Patient with constipation and urinary incontinence prior to surgery. Appears to be improved but will need to monitor.    -Continue scheduled Senna-Docusate.  -Will need to check PVRs. Nursing did not check due to incontinence.  Discussed with patient and nursing about neurogenic bladder including need to check PVRs.  Bladder scans 52 and 64 after voiding. Will continue to monitor      Pain - Patient with both neuropathic and post-operative pain.  Discussed need to reduce opiate use.  Currently on Gabapentin 600 mg TID and Lyrica 300 mg daily.  Oxycodone and Tylenol PRN.      HTN/CV - Patient on HCTZ 25 mg daily, Lisinopril 20 mg daily, Diltiazem  mg daily     Vitamin D - On supplementation, admission labs within normal limits. Continue 2000 daily     GI Ppx - Patient on prilosec      DVT Ppx - Patient on Lovenox on transfer. Will monitor for ambulation. Patient ambulating 40 feet     Total time:  30 minutes.  I spent greater than 50% of the time for patient care, counseling, and coordination on this date, including unit/floor time, and face-to-face time with the patient as per interval events and assessment and plan above. Topics discussed included continued education on neurogenic bladder with good PVRs, and discussed wheelchair vs AFOs at discharge.     Coni Hope M.D.

## 2018-07-26 NOTE — CARE PLAN
Problem: Safety  Goal: Will remain free from falls    Intervention: Implement fall precautions  Appropriately uses call light to make needs known.Bed in low position.Call light within reach.Will continue to monitor and assess needs and safety.      Problem: Bowel/Gastric:  Goal: Normal bowel function is maintained or improved    Intervention: Educate patient and significant other/support system about signs and symptoms of constipation and interventions to implement  Pt refused scheduled bowel medication.Remains continent of bowel.LBM 7/25.Will continue to monitor and assess for s/sx of constipation.      Problem: Pain Management  Goal: Pain level will decrease to patient's comfort goal    Intervention: Follow pain managment plan developed in collaboration with patient and Interdisciplinary Team  Medicated per request for pain with good effect.Repositioned with pillows for comfort.Will continue to monitor and assess pain level and medicate as needed.

## 2018-07-26 NOTE — REHAB-DIETARY IDT TEAM NOTE
"Dietary   Nutrition       Dietary Problems (Active)            There are no active problems.      Nutrition services: Day 3 of admit.  Sari Ceja is a 53 y.o. female with admitting DX of Other Orthopedic/Lumbar Stenosis with Neurogenic Caludication.     Consult received for supplements.     Pt with PMH of Unspec. Hemorrhagic conditions, renal disorder, HTN, Bowel Habit changes, and Arthritis. Admitted for decreased function after L2-L5 decompression and laminectomies. Reviewed chart. Current diet order Diabetic and PO of % x 3 meals and 50-75% x 1 and sufficient. Note on 7/25 A1C of 5.8 and at DM goal of <7.0. RD available and consult as needed.       Assessment:  Height: 165.1 cm (5' 5\")  Weight: 90.7 kg (200 lb)  Body mass index is 33.28 kg/m².  Diet/Intake: Diabetic     Labs, MAR and Chart Reviewed     Diagnosis: No nutritional dx at this time     Recommendations/Plan:  1. Diet as ordered.   2. Encourage intake of 50% or greater  3. Document intake of all meals  as % taken in ADL's to provide interdisciplinary communication across all shifts.   4. Monitor weight.  5. Nutrition rep will continue to see patient for ongoing meal and snack preferences.     Section completed by:  Smitha Jarrett     "

## 2018-07-26 NOTE — DIETARY
"Nutrition services: Day 3 of admit.  Sari Ceja is a 53 y.o. female with admitting DX of Other Orthopedic/Lumbar Stenosis with Neurogenic Caludication.     Consult received for supplements.     Pt with PMH of Unspec. Hemorrhagic conditions, renal disorder, HTN, Bowel Habit changes, and Arthritis. Admitted for decreased function after L2-L5 decompression and laminectomies. Reviewed chart. Current diet order Diabetic and PO of % x 3 meals and 50-75% x 1 and sufficient. Note on 7/25 A1C of 5.8 and at DM goal of <7.0. RD available and consult as needed.       Assessment:  Height: 165.1 cm (5' 5\")  Weight: 90.7 kg (200 lb)  Body mass index is 33.28 kg/m².  Diet/Intake: Diabetic     Labs, MAR and Chart Reviewed     Diagnosis: No nutritional dx at this time     Recommendations/Plan:  1. Diet as ordered.   2. Encourage intake of 50% or greater  3. Document intake of all meals  as % taken in ADL's to provide interdisciplinary communication across all shifts.   4. Monitor weight.  5. Nutrition rep will continue to see patient for ongoing meal and snack preferences.             "

## 2018-07-27 PROCEDURE — 700112 HCHG RX REV CODE 229: Performed by: PHYSICAL MEDICINE & REHABILITATION

## 2018-07-27 PROCEDURE — 97110 THERAPEUTIC EXERCISES: CPT

## 2018-07-27 PROCEDURE — 97112 NEUROMUSCULAR REEDUCATION: CPT

## 2018-07-27 PROCEDURE — 97530 THERAPEUTIC ACTIVITIES: CPT

## 2018-07-27 PROCEDURE — 97535 SELF CARE MNGMENT TRAINING: CPT

## 2018-07-27 PROCEDURE — 700102 HCHG RX REV CODE 250 W/ 637 OVERRIDE(OP): Performed by: PHYSICAL MEDICINE & REHABILITATION

## 2018-07-27 PROCEDURE — A9270 NON-COVERED ITEM OR SERVICE: HCPCS | Performed by: PHYSICAL MEDICINE & REHABILITATION

## 2018-07-27 PROCEDURE — 700111 HCHG RX REV CODE 636 W/ 250 OVERRIDE (IP): Performed by: PHYSICAL MEDICINE & REHABILITATION

## 2018-07-27 PROCEDURE — 99232 SBSQ HOSP IP/OBS MODERATE 35: CPT | Performed by: PHYSICAL MEDICINE & REHABILITATION

## 2018-07-27 PROCEDURE — 97116 GAIT TRAINING THERAPY: CPT

## 2018-07-27 PROCEDURE — 770010 HCHG ROOM/CARE - REHAB SEMI PRIVAT*

## 2018-07-27 RX ADMIN — CALCIUM CARBONATE (ANTACID) CHEW TAB 500 MG 500 MG: 500 CHEW TAB at 19:39

## 2018-07-27 RX ADMIN — BACITRACIN ZINC AND POLYMYXIN B SULFATE: at 19:45

## 2018-07-27 RX ADMIN — OXYCODONE HYDROCHLORIDE 10 MG: 10 TABLET ORAL at 19:39

## 2018-07-27 RX ADMIN — ENOXAPARIN SODIUM 40 MG: 100 INJECTION SUBCUTANEOUS at 19:39

## 2018-07-27 RX ADMIN — OXYCODONE HYDROCHLORIDE 10 MG: 10 TABLET ORAL at 15:59

## 2018-07-27 RX ADMIN — GABAPENTIN 600 MG: 300 CAPSULE ORAL at 19:39

## 2018-07-27 RX ADMIN — OXYCODONE HYDROCHLORIDE 10 MG: 10 TABLET ORAL at 05:46

## 2018-07-27 RX ADMIN — DOCUSATE SODIUM 100 MG: 100 CAPSULE, LIQUID FILLED ORAL at 19:39

## 2018-07-27 RX ADMIN — BACITRACIN ZINC AND POLYMYXIN B SULFATE: at 08:48

## 2018-07-27 RX ADMIN — DILTIAZEM HYDROCHLORIDE 240 MG: 120 CAPSULE, COATED, EXTENDED RELEASE ORAL at 08:42

## 2018-07-27 RX ADMIN — LISINOPRIL 20 MG: 20 TABLET ORAL at 08:44

## 2018-07-27 RX ADMIN — DOCUSATE SODIUM 100 MG: 100 CAPSULE, LIQUID FILLED ORAL at 08:44

## 2018-07-27 RX ADMIN — OXYCODONE HYDROCHLORIDE 10 MG: 10 TABLET ORAL at 01:35

## 2018-07-27 RX ADMIN — HYDROCHLOROTHIAZIDE 25 MG: 25 TABLET ORAL at 08:44

## 2018-07-27 RX ADMIN — OMEPRAZOLE 20 MG: 20 CAPSULE, DELAYED RELEASE ORAL at 08:42

## 2018-07-27 RX ADMIN — CALCIUM CARBONATE (ANTACID) CHEW TAB 500 MG 500 MG: 500 CHEW TAB at 08:44

## 2018-07-27 RX ADMIN — SENNOSIDES AND DOCUSATE SODIUM 1 TABLET: 8.6; 5 TABLET ORAL at 08:41

## 2018-07-27 RX ADMIN — PREGABALIN 300 MG: 100 CAPSULE ORAL at 08:41

## 2018-07-27 RX ADMIN — VITAMIN D, TAB 1000IU (100/BT) 2000 UNITS: 25 TAB at 08:41

## 2018-07-27 RX ADMIN — GABAPENTIN 600 MG: 300 CAPSULE ORAL at 08:41

## 2018-07-27 RX ADMIN — OXYCODONE HYDROCHLORIDE 10 MG: 10 TABLET ORAL at 11:36

## 2018-07-27 RX ADMIN — GABAPENTIN 600 MG: 300 CAPSULE ORAL at 15:59

## 2018-07-27 ASSESSMENT — PAIN SCALES - GENERAL
PAINLEVEL_OUTOF10: 6
PAINLEVEL_OUTOF10: 0
PAINLEVEL_OUTOF10: ASSUMED PAIN PRESENT
PAINLEVEL_OUTOF10: 7

## 2018-07-27 NOTE — PROGRESS NOTES
"Rehab Progress Note     Encounter Date: 7/27/2018    CC: Decreased function after L2-L5 decompression and laminectomies    Interval Events (Subjective)  Patient reports she is doing well. She reports she worked with e-stim on her legs and her legs are now tired. She reports her right leg is doing well but her left leg is trailing behind. Discussed that it will take time. She reports she has much more sensation in her bladder and is making it to the bathroom on time. Reviewed PVRs and < 100.  Denies N/V/D.     IDT Team Meeting 7/25/2018  DC/Disposition:  8/13/18    Objective:  VITAL SIGNS: /78   Pulse 71   Temp 36.7 °C (98.1 °F)   Resp 16   Ht 1.651 m (5' 5\")   Wt 90.7 kg (200 lb)   LMP 12/01/2013   SpO2 95%   BMI 33.28 kg/m²   Gen: NAD  Psych: Mood and affect appropriate  CV: RRR, no edema  Resp: CTAB, no upper airway sounds  Abd: NTND  Neuro: AOx4, 5/5 BUE.  R EHL 2/5, L 1/5.  ADF 2/5 R, 0/5 L    No results found for this or any previous visit (from the past 72 hour(s)).    Current Facility-Administered Medications   Medication Frequency   • omeprazole (PRILOSEC) capsule 20 mg DAILY   • senna-docusate (PERICOLACE or SENOKOT S) 8.6-50 MG per tablet 2 Tab BID PRN    And   • polyethylene glycol/lytes (MIRALAX) PACKET 1 Packet QDAY PRN    And   • magnesium hydroxide (MILK OF MAGNESIA) suspension 30 mL QDAY PRN    And   • bisacodyl (DULCOLAX) suppository 10 mg QDAY PRN   • senna-docusate (PERICOLACE or SENOKOT S) 8.6-50 MG per tablet 1 Tab BID   • DILTIAZem CD (CARDIZEM CD) capsule 240 mg DAILY   • Respiratory Care per Protocol Continuous RT   • Pharmacy Consult Request ...Pain Management Review 1 Each PRN   • oxyCODONE immediate release (ROXICODONE) tablet 10 mg Q3HRS PRN   • oxyCODONE immediate-release (ROXICODONE) tablet 15 mg Q3HRS PRN   • acetaminophen (TYLENOL) tablet 650 mg Q4HRS PRN   • artificial tears 1.4 % ophthalmic solution 1 Drop PRN   • benzocaine-menthol (CEPACOL) lozenge 1 Lozenge Q2HRS " PRN   • hydrALAZINE (APRESOLINE) tablet 25 mg Q8HRS PRN   • mag hydrox-al hydrox-simeth (MAALOX PLUS ES or MYLANTA DS) suspension 20 mL Q2HRS PRN   • ondansetron (ZOFRAN ODT) dispertab 4 mg 4X/DAY PRN    Or   • ondansetron (ZOFRAN) syringe/vial injection 4 mg 4X/DAY PRN   • traZODone (DESYREL) tablet 50 mg QHS PRN   • sodium chloride (OCEAN) 0.65 % nasal spray 2 Spray PRN   • benzocaine-menthol (CEPACOL) lozenge 1 Lozenge Q2HRS PRN   • calcium carbonate (TUMS) chewable tab 500 mg BID   • docusate sodium (COLACE) capsule 100 mg BID   • tizanidine (ZANAFLEX) tablet 2 mg TID PRN   • bacitracin-polymyxin b (POLYSPORIN) 500-72067 UNIT/GM ointment BID   • enoxaparin (LOVENOX) inj 40 mg QHS   • gabapentin (NEURONTIN) capsule 600 mg TID   • hydroCHLOROthiazide (HYDRODIURIL) tablet 25 mg DAILY   • lisinopril (PRINIVIL) tablet 20 mg DAILY   • pregabalin (LYRICA) capsule 300 mg DAILY   • vitamin D (cholecalciferol) tablet 2,000 Units DAILY       Orders Placed This Encounter   Procedures   • Diet Order Regular     Standing Status:   Standing     Number of Occurrences:   1     Order Specific Question:   Diet:     Answer:   Regular [1]       Assessment:  Active Hospital Problems    Diagnosis   • *Lumbar stenosis with neurogenic claudication   • HTN (hypertension)   • High cholesterol   • Pain   • Bowel habit changes       Medical Decision Making and Plan:  Severe lumbar stenosis w cauda equina - Patient with pre-operative bowel/bladder changes as well as decreased sensation and bilateral foot drop suggestive of cauda equina.  Patient is s/p L2-L5 decompression and posterior fusion with Dr. Perez on 7/18/18.  Since surgery patient has improved with bowel/bladder but continues to have weakness and decreased sensation.  -LSO when OOB with spinal precautions  -PT and OT to evaluate and treat mobility and ADLs.  Patient will most likely need bilateral custom AFOs for in home mobility and ADLs. Will discuss with therapists closer to  discharge  -Follow-up with Dr. Perez' clinic 2 weeks and 6 weeks post-discharge     Neurogenic Bowel/Bladder - Patient with constipation and urinary incontinence prior to surgery. Appears to be improved but will need to monitor.    -Continue scheduled Senna-Docusate.  -Will need to check PVRs. Bladder scans 52 and 64 after voiding. Can discontinue as improving sensation and minimal residuals     Pain - Patient with both neuropathic and post-operative pain.  Discussed need to reduce opiate use.  Currently on Gabapentin 600 mg TID and Lyrica 300 mg daily.  Oxycodone and Tylenol PRN.      HTN/CV - Patient on HCTZ 25 mg daily, Lisinopril 20 mg daily, Diltiazem  mg daily     Vitamin D - On supplementation, admission labs within normal limits. Continue 2000 daily     GI Ppx - Patient on prilosec      DVT Ppx - Patient on Lovenox on transfer. Will monitor for ambulation. Patient ambulating 110 feet, goal of 125. Stop YENI hoses as discomfort and close to ambulation.      Total time:  30 minutes.  I spent greater than 50% of the time for patient care, counseling, and coordination on this date, including unit/floor time, and face-to-face time with the patient as per interval events and assessment and plan above. Topics discussed included AFOs for her feet (L > R), PVRs and ambulation goals to stop lovenox.  OK for patient to stop YENI hoses as causing discomfort.      Coni Hope M.D.

## 2018-07-27 NOTE — CARE PLAN
Problem: Infection  Goal: Will remain free from infection  Outcome: PROGRESSING AS EXPECTED  Patient remains free from s/s infection; afebrile. Incision  is intact and approximated with staples and has foam dressing in place for comfort.    Problem: Pain Management  Goal: Pain level will decrease to patient's comfort goal  Outcome: PROGRESSING AS EXPECTED  Patient able to verbalize pain level and verbalize an acceptable level of pain.Pt able to participate in therapies and activities this shift.

## 2018-07-27 NOTE — CARE PLAN
Problem: Communication  Goal: The ability to communicate needs accurately and effectively will improve  Patient alert and oriented x 4 with clear speech and ability to convey needs/wants to care team.    Problem: Safety  Goal: Will remain free from injury  Patient uses call light appropriately for assistance as needed/wanted. Bed locked, in lowest position.

## 2018-07-28 PROCEDURE — 97110 THERAPEUTIC EXERCISES: CPT

## 2018-07-28 PROCEDURE — 700102 HCHG RX REV CODE 250 W/ 637 OVERRIDE(OP): Performed by: PHYSICAL MEDICINE & REHABILITATION

## 2018-07-28 PROCEDURE — 97530 THERAPEUTIC ACTIVITIES: CPT

## 2018-07-28 PROCEDURE — 700111 HCHG RX REV CODE 636 W/ 250 OVERRIDE (IP): Performed by: PHYSICAL MEDICINE & REHABILITATION

## 2018-07-28 PROCEDURE — A9270 NON-COVERED ITEM OR SERVICE: HCPCS | Performed by: PHYSICAL MEDICINE & REHABILITATION

## 2018-07-28 PROCEDURE — 97112 NEUROMUSCULAR REEDUCATION: CPT

## 2018-07-28 PROCEDURE — 770010 HCHG ROOM/CARE - REHAB SEMI PRIVAT*

## 2018-07-28 PROCEDURE — 97116 GAIT TRAINING THERAPY: CPT

## 2018-07-28 PROCEDURE — 700112 HCHG RX REV CODE 229: Performed by: PHYSICAL MEDICINE & REHABILITATION

## 2018-07-28 RX ADMIN — SENNOSIDES AND DOCUSATE SODIUM 1 TABLET: 8.6; 5 TABLET ORAL at 09:00

## 2018-07-28 RX ADMIN — CALCIUM CARBONATE (ANTACID) CHEW TAB 500 MG 500 MG: 500 CHEW TAB at 19:54

## 2018-07-28 RX ADMIN — HYDROCHLOROTHIAZIDE 25 MG: 25 TABLET ORAL at 08:27

## 2018-07-28 RX ADMIN — Medication 2 TABLET: at 08:27

## 2018-07-28 RX ADMIN — ENOXAPARIN SODIUM 40 MG: 100 INJECTION SUBCUTANEOUS at 19:54

## 2018-07-28 RX ADMIN — GABAPENTIN 600 MG: 300 CAPSULE ORAL at 19:53

## 2018-07-28 RX ADMIN — OXYCODONE HYDROCHLORIDE 10 MG: 10 TABLET ORAL at 00:02

## 2018-07-28 RX ADMIN — TIZANIDINE 2 MG: 4 TABLET ORAL at 00:02

## 2018-07-28 RX ADMIN — LISINOPRIL 20 MG: 20 TABLET ORAL at 08:24

## 2018-07-28 RX ADMIN — OXYCODONE HYDROCHLORIDE 10 MG: 10 TABLET ORAL at 19:52

## 2018-07-28 RX ADMIN — SENNOSIDES AND DOCUSATE SODIUM 1 TABLET: 8.6; 5 TABLET ORAL at 19:53

## 2018-07-28 RX ADMIN — TIZANIDINE 2 MG: 4 TABLET ORAL at 23:38

## 2018-07-28 RX ADMIN — DOCUSATE SODIUM 100 MG: 100 CAPSULE, LIQUID FILLED ORAL at 08:27

## 2018-07-28 RX ADMIN — OXYCODONE HYDROCHLORIDE 10 MG: 10 TABLET ORAL at 11:33

## 2018-07-28 RX ADMIN — GABAPENTIN 600 MG: 300 CAPSULE ORAL at 08:24

## 2018-07-28 RX ADMIN — OXYCODONE HYDROCHLORIDE 10 MG: 10 TABLET ORAL at 03:50

## 2018-07-28 RX ADMIN — OMEPRAZOLE 20 MG: 20 CAPSULE, DELAYED RELEASE ORAL at 08:28

## 2018-07-28 RX ADMIN — OXYCODONE HYDROCHLORIDE 10 MG: 10 TABLET ORAL at 23:38

## 2018-07-28 RX ADMIN — DILTIAZEM HYDROCHLORIDE 240 MG: 120 CAPSULE, COATED, EXTENDED RELEASE ORAL at 08:28

## 2018-07-28 RX ADMIN — DOCUSATE SODIUM 100 MG: 100 CAPSULE, LIQUID FILLED ORAL at 19:54

## 2018-07-28 RX ADMIN — VITAMIN D, TAB 1000IU (100/BT) 2000 UNITS: 25 TAB at 08:28

## 2018-07-28 RX ADMIN — BACITRACIN ZINC AND POLYMYXIN B SULFATE: at 08:33

## 2018-07-28 RX ADMIN — OXYCODONE HYDROCHLORIDE 10 MG: 10 TABLET ORAL at 08:29

## 2018-07-28 RX ADMIN — CALCIUM CARBONATE (ANTACID) CHEW TAB 500 MG 500 MG: 500 CHEW TAB at 08:24

## 2018-07-28 RX ADMIN — PREGABALIN 300 MG: 100 CAPSULE ORAL at 08:24

## 2018-07-28 RX ADMIN — POLYVINYL ALCOHOL 1 DROP: 14 SOLUTION/ DROPS OPHTHALMIC at 10:51

## 2018-07-28 RX ADMIN — GABAPENTIN 600 MG: 300 CAPSULE ORAL at 14:44

## 2018-07-28 RX ADMIN — OXYCODONE HYDROCHLORIDE 10 MG: 10 TABLET ORAL at 14:48

## 2018-07-28 RX ADMIN — BACITRACIN ZINC AND POLYMYXIN B SULFATE: at 22:08

## 2018-07-28 ASSESSMENT — PAIN SCALES - GENERAL
PAINLEVEL_OUTOF10: ASSUMED PAIN PRESENT
PAINLEVEL_OUTOF10: 7
PAINLEVEL_OUTOF10: 6
PAINLEVEL_OUTOF10: ASSUMED PAIN PRESENT
PAINLEVEL_OUTOF10: 5
PAINLEVEL_OUTOF10: ASSUMED PAIN PRESENT
PAINLEVEL_OUTOF10: 2

## 2018-07-28 NOTE — CARE PLAN
Problem: Pain Management  Goal: Pain level will decrease to patient's comfort goal  Plan of care for this shift pain management discussed with patient - current PRN medications have been successful in controlling pain. Will assess pain level Q4hrs and as needed this shift.

## 2018-07-29 PROCEDURE — 700101 HCHG RX REV CODE 250: Performed by: PHYSICAL MEDICINE & REHABILITATION

## 2018-07-29 PROCEDURE — 700112 HCHG RX REV CODE 229: Performed by: PHYSICAL MEDICINE & REHABILITATION

## 2018-07-29 PROCEDURE — 770010 HCHG ROOM/CARE - REHAB SEMI PRIVAT*

## 2018-07-29 PROCEDURE — 700102 HCHG RX REV CODE 250 W/ 637 OVERRIDE(OP): Performed by: PHYSICAL MEDICINE & REHABILITATION

## 2018-07-29 PROCEDURE — 700111 HCHG RX REV CODE 636 W/ 250 OVERRIDE (IP): Performed by: PHYSICAL MEDICINE & REHABILITATION

## 2018-07-29 PROCEDURE — A9270 NON-COVERED ITEM OR SERVICE: HCPCS | Performed by: PHYSICAL MEDICINE & REHABILITATION

## 2018-07-29 RX ADMIN — VITAMIN D, TAB 1000IU (100/BT) 2000 UNITS: 25 TAB at 08:25

## 2018-07-29 RX ADMIN — OXYCODONE HYDROCHLORIDE 10 MG: 10 TABLET ORAL at 19:55

## 2018-07-29 RX ADMIN — CALCIUM CARBONATE (ANTACID) CHEW TAB 500 MG 500 MG: 500 CHEW TAB at 19:54

## 2018-07-29 RX ADMIN — OXYCODONE HYDROCHLORIDE 10 MG: 10 TABLET ORAL at 04:47

## 2018-07-29 RX ADMIN — SENNOSIDES AND DOCUSATE SODIUM 1 TABLET: 8.6; 5 TABLET ORAL at 19:55

## 2018-07-29 RX ADMIN — GABAPENTIN 600 MG: 300 CAPSULE ORAL at 15:01

## 2018-07-29 RX ADMIN — OXYCODONE HYDROCHLORIDE 10 MG: 10 TABLET ORAL at 07:36

## 2018-07-29 RX ADMIN — DOCUSATE SODIUM 100 MG: 100 CAPSULE, LIQUID FILLED ORAL at 08:26

## 2018-07-29 RX ADMIN — BACITRACIN ZINC AND POLYMYXIN B SULFATE: at 15:01

## 2018-07-29 RX ADMIN — LISINOPRIL 20 MG: 20 TABLET ORAL at 08:26

## 2018-07-29 RX ADMIN — GABAPENTIN 600 MG: 300 CAPSULE ORAL at 08:25

## 2018-07-29 RX ADMIN — SENNOSIDES AND DOCUSATE SODIUM 1 TABLET: 8.6; 5 TABLET ORAL at 08:25

## 2018-07-29 RX ADMIN — OMEPRAZOLE 20 MG: 20 CAPSULE, DELAYED RELEASE ORAL at 08:25

## 2018-07-29 RX ADMIN — CALCIUM CARBONATE (ANTACID) CHEW TAB 500 MG 500 MG: 500 CHEW TAB at 08:25

## 2018-07-29 RX ADMIN — POLYVINYL ALCOHOL 1 DROP: 14 SOLUTION/ DROPS OPHTHALMIC at 15:02

## 2018-07-29 RX ADMIN — OXYCODONE HYDROCHLORIDE 10 MG: 10 TABLET ORAL at 11:44

## 2018-07-29 RX ADMIN — PREGABALIN 300 MG: 100 CAPSULE ORAL at 08:24

## 2018-07-29 RX ADMIN — DILTIAZEM HYDROCHLORIDE 240 MG: 120 CAPSULE, COATED, EXTENDED RELEASE ORAL at 08:25

## 2018-07-29 RX ADMIN — HYDROCHLOROTHIAZIDE 25 MG: 25 TABLET ORAL at 08:24

## 2018-07-29 RX ADMIN — OXYCODONE HYDROCHLORIDE 10 MG: 10 TABLET ORAL at 15:11

## 2018-07-29 RX ADMIN — DOCUSATE SODIUM 100 MG: 100 CAPSULE, LIQUID FILLED ORAL at 19:55

## 2018-07-29 RX ADMIN — ENOXAPARIN SODIUM 40 MG: 100 INJECTION SUBCUTANEOUS at 19:55

## 2018-07-29 RX ADMIN — GABAPENTIN 600 MG: 300 CAPSULE ORAL at 19:55

## 2018-07-29 ASSESSMENT — PAIN SCALES - GENERAL
PAINLEVEL_OUTOF10: ASSUMED PAIN PRESENT
PAINLEVEL_OUTOF10: 7
PAINLEVEL_OUTOF10: 5
PAINLEVEL_OUTOF10: 5
PAINLEVEL_OUTOF10: 7
PAINLEVEL_OUTOF10: 2

## 2018-07-29 NOTE — CARE PLAN
Problem: Communication  Goal: The ability to communicate needs accurately and effectively will improve  Patient alert and oriented x 4 with clear speech and ability to convey needs/wants to care team.    Problem: Bowel/Gastric:  Goal: Normal bowel function is maintained or improved  Patient taking scheduled bowel medication; bowel sounds WNL x4 quadrants and abdomin soft and non-tender.

## 2018-07-30 PROCEDURE — A9270 NON-COVERED ITEM OR SERVICE: HCPCS | Performed by: PHYSICAL MEDICINE & REHABILITATION

## 2018-07-30 PROCEDURE — 700112 HCHG RX REV CODE 229: Performed by: PHYSICAL MEDICINE & REHABILITATION

## 2018-07-30 PROCEDURE — 97110 THERAPEUTIC EXERCISES: CPT

## 2018-07-30 PROCEDURE — 770010 HCHG ROOM/CARE - REHAB SEMI PRIVAT*

## 2018-07-30 PROCEDURE — 97116 GAIT TRAINING THERAPY: CPT

## 2018-07-30 PROCEDURE — 97535 SELF CARE MNGMENT TRAINING: CPT

## 2018-07-30 PROCEDURE — 99232 SBSQ HOSP IP/OBS MODERATE 35: CPT | Performed by: PHYSICAL MEDICINE & REHABILITATION

## 2018-07-30 PROCEDURE — 700102 HCHG RX REV CODE 250 W/ 637 OVERRIDE(OP): Performed by: PHYSICAL MEDICINE & REHABILITATION

## 2018-07-30 PROCEDURE — 700111 HCHG RX REV CODE 636 W/ 250 OVERRIDE (IP): Performed by: PHYSICAL MEDICINE & REHABILITATION

## 2018-07-30 RX ADMIN — VITAMIN D, TAB 1000IU (100/BT) 2000 UNITS: 25 TAB at 07:53

## 2018-07-30 RX ADMIN — OXYCODONE HYDROCHLORIDE 10 MG: 10 TABLET ORAL at 16:05

## 2018-07-30 RX ADMIN — ENOXAPARIN SODIUM 40 MG: 100 INJECTION SUBCUTANEOUS at 19:58

## 2018-07-30 RX ADMIN — HYDROCHLOROTHIAZIDE 25 MG: 25 TABLET ORAL at 07:54

## 2018-07-30 RX ADMIN — SENNOSIDES AND DOCUSATE SODIUM 1 TABLET: 8.6; 5 TABLET ORAL at 07:53

## 2018-07-30 RX ADMIN — GABAPENTIN 600 MG: 300 CAPSULE ORAL at 07:54

## 2018-07-30 RX ADMIN — DILTIAZEM HYDROCHLORIDE 240 MG: 120 CAPSULE, COATED, EXTENDED RELEASE ORAL at 07:54

## 2018-07-30 RX ADMIN — TIZANIDINE 2 MG: 4 TABLET ORAL at 03:12

## 2018-07-30 RX ADMIN — OXYCODONE HYDROCHLORIDE 10 MG: 10 TABLET ORAL at 03:04

## 2018-07-30 RX ADMIN — OMEPRAZOLE 20 MG: 20 CAPSULE, DELAYED RELEASE ORAL at 07:54

## 2018-07-30 RX ADMIN — LISINOPRIL 20 MG: 20 TABLET ORAL at 07:54

## 2018-07-30 RX ADMIN — OXYCODONE HYDROCHLORIDE 10 MG: 10 TABLET ORAL at 11:34

## 2018-07-30 RX ADMIN — DOCUSATE SODIUM 100 MG: 100 CAPSULE, LIQUID FILLED ORAL at 07:54

## 2018-07-30 RX ADMIN — OXYCODONE HYDROCHLORIDE 10 MG: 10 TABLET ORAL at 19:57

## 2018-07-30 RX ADMIN — GABAPENTIN 600 MG: 300 CAPSULE ORAL at 14:59

## 2018-07-30 RX ADMIN — CALCIUM CARBONATE (ANTACID) CHEW TAB 500 MG 500 MG: 500 CHEW TAB at 19:58

## 2018-07-30 RX ADMIN — CALCIUM CARBONATE (ANTACID) CHEW TAB 500 MG 500 MG: 500 CHEW TAB at 07:54

## 2018-07-30 RX ADMIN — PREGABALIN 300 MG: 100 CAPSULE ORAL at 07:53

## 2018-07-30 RX ADMIN — GABAPENTIN 600 MG: 300 CAPSULE ORAL at 19:57

## 2018-07-30 RX ADMIN — OXYCODONE HYDROCHLORIDE 10 MG: 10 TABLET ORAL at 07:54

## 2018-07-30 RX ADMIN — DOCUSATE SODIUM 100 MG: 100 CAPSULE, LIQUID FILLED ORAL at 19:57

## 2018-07-30 ASSESSMENT — PATIENT HEALTH QUESTIONNAIRE - PHQ9
1. LITTLE INTEREST OR PLEASURE IN DOING THINGS: NOT AT ALL
2. FEELING DOWN, DEPRESSED, IRRITABLE, OR HOPELESS: NOT AT ALL
SUM OF ALL RESPONSES TO PHQ9 QUESTIONS 1 AND 2: 0

## 2018-07-30 ASSESSMENT — PAIN SCALES - GENERAL
PAINLEVEL_OUTOF10: 6
PAINLEVEL_OUTOF10: 9
PAINLEVEL_OUTOF10: 0
PAINLEVEL_OUTOF10: 6

## 2018-07-30 NOTE — PROGRESS NOTES
"Rehab Progress Note     Encounter Date: 7/30/2018    CC: Decreased function after L2-L5 decompression and laminectomies    Interval Events (Subjective)  Patient reports she is doing well. Had discussion with CM, boyfriend left a VM stating patient was not being treated well. Addressed patient this AM and she reports no issues with staffing or medications. She reports only one time where pain medications took too long but she understood, no concern about her treatment here. She reports her pain is currently controlled.  She was molded for custom AFOs, they should arrive later today. Per PT will work with her tomorrow with them. Roseanne FERNANDES    IDT Team Meeting 7/25/2018  DC/Disposition:  8/13/18    Objective:  VITAL SIGNS: /62   Pulse 92   Temp 36.3 °C (97.4 °F)   Resp 18   Ht 1.651 m (5' 5\")   Wt 89.5 kg (197 lb 5 oz)   LMP 12/01/2013   SpO2 97%   BMI 32.83 kg/m²   Gen: NAD  Psych: Mood and affect appropriate  CV: RRR, no edema  Resp: CTAB, no upper airway sounds  Abd: NTND  Neuro: AOx4, 2/5 bilateral ankle plantar flexion, minimal EHL movement    No results found for this or any previous visit (from the past 72 hour(s)).    Current Facility-Administered Medications   Medication Frequency   • omeprazole (PRILOSEC) capsule 20 mg DAILY   • senna-docusate (PERICOLACE or SENOKOT S) 8.6-50 MG per tablet 2 Tab BID PRN    And   • polyethylene glycol/lytes (MIRALAX) PACKET 1 Packet QDAY PRN    And   • magnesium hydroxide (MILK OF MAGNESIA) suspension 30 mL QDAY PRN    And   • bisacodyl (DULCOLAX) suppository 10 mg QDAY PRN   • senna-docusate (PERICOLACE or SENOKOT S) 8.6-50 MG per tablet 1 Tab BID   • DILTIAZem CD (CARDIZEM CD) capsule 240 mg DAILY   • Respiratory Care per Protocol Continuous RT   • Pharmacy Consult Request ...Pain Management Review 1 Each PRN   • oxyCODONE immediate release (ROXICODONE) tablet 10 mg Q3HRS PRN   • oxyCODONE immediate-release (ROXICODONE) tablet 15 mg Q3HRS PRN   • acetaminophen " (TYLENOL) tablet 650 mg Q4HRS PRN   • artificial tears 1.4 % ophthalmic solution 1 Drop PRN   • benzocaine-menthol (CEPACOL) lozenge 1 Lozenge Q2HRS PRN   • hydrALAZINE (APRESOLINE) tablet 25 mg Q8HRS PRN   • mag hydrox-al hydrox-simeth (MAALOX PLUS ES or MYLANTA DS) suspension 20 mL Q2HRS PRN   • ondansetron (ZOFRAN ODT) dispertab 4 mg 4X/DAY PRN    Or   • ondansetron (ZOFRAN) syringe/vial injection 4 mg 4X/DAY PRN   • traZODone (DESYREL) tablet 50 mg QHS PRN   • sodium chloride (OCEAN) 0.65 % nasal spray 2 Spray PRN   • benzocaine-menthol (CEPACOL) lozenge 1 Lozenge Q2HRS PRN   • calcium carbonate (TUMS) chewable tab 500 mg BID   • docusate sodium (COLACE) capsule 100 mg BID   • tizanidine (ZANAFLEX) tablet 2 mg TID PRN   • enoxaparin (LOVENOX) inj 40 mg QHS   • gabapentin (NEURONTIN) capsule 600 mg TID   • hydroCHLOROthiazide (HYDRODIURIL) tablet 25 mg DAILY   • lisinopril (PRINIVIL) tablet 20 mg DAILY   • pregabalin (LYRICA) capsule 300 mg DAILY   • vitamin D (cholecalciferol) tablet 2,000 Units DAILY       Orders Placed This Encounter   Procedures   • Diet Order Regular     Standing Status:   Standing     Number of Occurrences:   1     Order Specific Question:   Diet:     Answer:   Regular [1]       Assessment:  Active Hospital Problems    Diagnosis   • *Lumbar stenosis with neurogenic claudication   • HTN (hypertension)   • High cholesterol   • Pain   • Bowel habit changes       Medical Decision Making and Plan:  Severe lumbar stenosis w cauda equina - Patient with pre-operative bowel/bladder changes as well as decreased sensation and bilateral foot drop suggestive of cauda equina.  Patient is s/p L2-L5 decompression and posterior fusion with Dr. Perez on 7/18/18.  Since surgery patient has improved with bowel/bladder but continues to have weakness and decreased sensation.  -LSO when OOB with spinal precautions  -PT and OT to evaluate and treat mobility and ADLs.  Patient would benefit from custom articulate  ankle-foot orthotics for in home mobility and ADLs.  Patient has been cast for them. Will be delivered 7/30 to begin training.    -Follow-up with Dr. Perez' clinic 2 weeks and 6 weeks post-discharge     Neurogenic Bowel/Bladder - Patient with constipation and urinary incontinence prior to surgery. Appears to be improved but will need to monitor.    -Continue scheduled Senna-Docusate.  -Will need to check PVRs. Bladder scans 52 and 64 after voiding. Can discontinue as improving sensation and minimal residuals     Pain - Patient with both neuropathic and post-operative pain.  Discussed need to reduce opiate use.  Currently on Gabapentin 600 mg TID and Lyrica 300 mg daily.  Oxycodone and Tylenol PRN.    -Utilizing significant Oxycodone dosage, discussed decreased reliance. Will consider increase in Gabapentin     HTN/CV - Patient on HCTZ 25 mg daily, Lisinopril 20 mg daily, Diltiazem  mg daily. SBP remains controlled.      Vitamin D - On supplementation, admission labs within normal limits. Continue 2000 daily     GI Ppx - Patient on prilosec      DVT Ppx - Patient on Lovenox on transfer. Will monitor for ambulation. Patient ambulating 110 feet, goal of 125. Stop YENI hoses as discomfort and close to ambulation.      Total time:  25 minutes.  I spent greater than 50% of the time for patient care, counseling, and coordination on this date, including unit/floor time, and face-to-face time with the patient as per interval events and assessment and plan above. Topics discussed included AFOs, discharge planning and pain control.     Coni Hope M.D.

## 2018-07-30 NOTE — THERAPY
Recreational Therapy Initial Plan of Care Note    1) Assessment:  Patient is 53 y.o. Female with a diagnosis of lumbar stenosis with neurogenic claudication.  Additional factors influencing patient status / progress (ie: cognitive factors, co-morbidities, social support, etc): hypertension; spinal stenosis.  Long term and short term goals have been discussed with patient and they are in agreement.  2) Plan:  Recommend Recreational Therapy 30-60 minutes per day 1-3 days per week for 3 weeks for the following treatments:  Community Re-Integration; Community, Skills Development; Leisure Skills Awareness; Leisure Skills Development; Social Skills Training; Cognitive Skills Training; Gross Motor Functional Leisure Skills; Fine Motor Functional Leisure Skills; Group Treatment  3) Goals:  Please refer to care plan for goals.

## 2018-07-30 NOTE — CARE PLAN
Problem: Safety  Goal: Will remain free from injury  Pt remains free of accidental injury at this time. Able to verbalize needs and does not attempt self transfer. Bed rails x2 secured for safety. Call light and personal belongings within reach. Hourly rounds done by staff to ensure safety and check if needs are met. Will continue to assess and monitor for safety.     Problem: Bowel/Gastric:  Goal: Normal bowel function is maintained or improved   07/29/18 1945   OTHER   Last BM 07/29/18     Bowel meds given as scheduled. Denies s/sx of constipation

## 2018-07-30 NOTE — PROGRESS NOTES
Received shift report and assumed care of patient.  Patient awake, calm and stable, currently positioned in bed for comfort and safety; call light within reach.Stated a pain level of 6 at this time.  Will continue to monitor.

## 2018-07-31 PROCEDURE — 97110 THERAPEUTIC EXERCISES: CPT

## 2018-07-31 PROCEDURE — 97530 THERAPEUTIC ACTIVITIES: CPT

## 2018-07-31 PROCEDURE — 97116 GAIT TRAINING THERAPY: CPT

## 2018-07-31 PROCEDURE — A9270 NON-COVERED ITEM OR SERVICE: HCPCS | Performed by: PHYSICAL MEDICINE & REHABILITATION

## 2018-07-31 PROCEDURE — 97112 NEUROMUSCULAR REEDUCATION: CPT

## 2018-07-31 PROCEDURE — 700111 HCHG RX REV CODE 636 W/ 250 OVERRIDE (IP): Performed by: PHYSICAL MEDICINE & REHABILITATION

## 2018-07-31 PROCEDURE — 700102 HCHG RX REV CODE 250 W/ 637 OVERRIDE(OP): Performed by: PHYSICAL MEDICINE & REHABILITATION

## 2018-07-31 PROCEDURE — 97535 SELF CARE MNGMENT TRAINING: CPT

## 2018-07-31 PROCEDURE — 99232 SBSQ HOSP IP/OBS MODERATE 35: CPT | Performed by: PHYSICAL MEDICINE & REHABILITATION

## 2018-07-31 PROCEDURE — 770010 HCHG ROOM/CARE - REHAB SEMI PRIVAT*

## 2018-07-31 PROCEDURE — 700112 HCHG RX REV CODE 229: Performed by: PHYSICAL MEDICINE & REHABILITATION

## 2018-07-31 RX ORDER — GABAPENTIN 300 MG/1
900 CAPSULE ORAL 3 TIMES DAILY
Status: DISCONTINUED | OUTPATIENT
Start: 2018-07-31 | End: 2018-08-10 | Stop reason: HOSPADM

## 2018-07-31 RX ADMIN — HYDROCHLOROTHIAZIDE 25 MG: 25 TABLET ORAL at 08:52

## 2018-07-31 RX ADMIN — OXYCODONE HYDROCHLORIDE 10 MG: 10 TABLET ORAL at 05:09

## 2018-07-31 RX ADMIN — GABAPENTIN 900 MG: 300 CAPSULE ORAL at 19:50

## 2018-07-31 RX ADMIN — PREGABALIN 300 MG: 100 CAPSULE ORAL at 08:53

## 2018-07-31 RX ADMIN — CALCIUM CARBONATE (ANTACID) CHEW TAB 500 MG 500 MG: 500 CHEW TAB at 08:51

## 2018-07-31 RX ADMIN — OXYCODONE HYDROCHLORIDE 10 MG: 10 TABLET ORAL at 12:34

## 2018-07-31 RX ADMIN — TIZANIDINE 2 MG: 4 TABLET ORAL at 00:27

## 2018-07-31 RX ADMIN — OXYCODONE HYDROCHLORIDE 10 MG: 10 TABLET ORAL at 00:27

## 2018-07-31 RX ADMIN — OMEPRAZOLE 20 MG: 20 CAPSULE, DELAYED RELEASE ORAL at 08:52

## 2018-07-31 RX ADMIN — OXYCODONE HYDROCHLORIDE 10 MG: 10 TABLET ORAL at 16:09

## 2018-07-31 RX ADMIN — OXYCODONE HYDROCHLORIDE 10 MG: 10 TABLET ORAL at 23:54

## 2018-07-31 RX ADMIN — DOCUSATE SODIUM 100 MG: 100 CAPSULE, LIQUID FILLED ORAL at 19:50

## 2018-07-31 RX ADMIN — LISINOPRIL 20 MG: 20 TABLET ORAL at 08:52

## 2018-07-31 RX ADMIN — VITAMIN D, TAB 1000IU (100/BT) 2000 UNITS: 25 TAB at 08:52

## 2018-07-31 RX ADMIN — DILTIAZEM HYDROCHLORIDE 240 MG: 120 CAPSULE, COATED, EXTENDED RELEASE ORAL at 08:51

## 2018-07-31 RX ADMIN — OXYCODONE HYDROCHLORIDE 10 MG: 10 TABLET ORAL at 19:50

## 2018-07-31 RX ADMIN — ENOXAPARIN SODIUM 40 MG: 100 INJECTION SUBCUTANEOUS at 19:49

## 2018-07-31 RX ADMIN — TIZANIDINE 2 MG: 4 TABLET ORAL at 21:25

## 2018-07-31 RX ADMIN — OXYCODONE HYDROCHLORIDE 10 MG: 10 TABLET ORAL at 08:58

## 2018-07-31 RX ADMIN — GABAPENTIN 600 MG: 300 CAPSULE ORAL at 08:52

## 2018-07-31 RX ADMIN — CALCIUM CARBONATE (ANTACID) CHEW TAB 500 MG 500 MG: 500 CHEW TAB at 19:50

## 2018-07-31 RX ADMIN — GABAPENTIN 900 MG: 300 CAPSULE ORAL at 14:53

## 2018-07-31 ASSESSMENT — PAIN SCALES - GENERAL
PAINLEVEL_OUTOF10: 8
PAINLEVEL_OUTOF10: 7
PAINLEVEL_OUTOF10: ASSUMED PAIN PRESENT
PAINLEVEL_OUTOF10: 7
PAINLEVEL_OUTOF10: 6
PAINLEVEL_OUTOF10: 5
PAINLEVEL_OUTOF10: 7
PAINLEVEL_OUTOF10: 0
PAINLEVEL_OUTOF10: 7
PAINLEVEL_OUTOF10: 7

## 2018-07-31 NOTE — PROGRESS NOTES
"Rehab Progress Note     Encounter Date: 7/31/2018    CC: Decreased function after L2-L5 decompression and laminectomies    Interval Events (Subjective)  Patient had follow-up with NSG this morning. Patient seen in therapy gym. Reviewed recommendations from NSG, appreciate recommendations.  Patient's staples have been removed which she reports there was a little drainage afterwards but otherwise now it is fine. She reports she received her AFOs which she is donning. She reports they were tight yesterday but today they fit better in a different pair of socks.  Patient reports pain is controlled. Denies N/V/D. Recommend follow-up with NSG after discharge.     IDT Team Meeting 7/25/2018  DC/Disposition:  8/13/18    Objective:  VITAL SIGNS: /65   Pulse 81   Temp 36.9 °C (98.5 °F)   Resp 18   Ht 1.651 m (5' 5\")   Wt 89.5 kg (197 lb 5 oz)   LMP 12/01/2013   SpO2 93%   BMI 32.83 kg/m²   Gen: NAD  Psych: Mood and affect appropriate  CV: RRR, no edema  Resp: CTAB, no upper airway sounds  Abd: NTND  Neuro: AOx4, 2/5 bilateral ankle plantar flexion, minimal EHL movement  Unchanged from 7/30/18, in AFOs now    No results found for this or any previous visit (from the past 72 hour(s)).    Current Facility-Administered Medications   Medication Frequency   • omeprazole (PRILOSEC) capsule 20 mg DAILY   • senna-docusate (PERICOLACE or SENOKOT S) 8.6-50 MG per tablet 2 Tab BID PRN    And   • polyethylene glycol/lytes (MIRALAX) PACKET 1 Packet QDAY PRN    And   • magnesium hydroxide (MILK OF MAGNESIA) suspension 30 mL QDAY PRN    And   • bisacodyl (DULCOLAX) suppository 10 mg QDAY PRN   • senna-docusate (PERICOLACE or SENOKOT S) 8.6-50 MG per tablet 1 Tab BID   • DILTIAZem CD (CARDIZEM CD) capsule 240 mg DAILY   • Respiratory Care per Protocol Continuous RT   • Pharmacy Consult Request ...Pain Management Review 1 Each PRN   • oxyCODONE immediate release (ROXICODONE) tablet 10 mg Q3HRS PRN   • oxyCODONE immediate-release " (ROXICODONE) tablet 15 mg Q3HRS PRN   • acetaminophen (TYLENOL) tablet 650 mg Q4HRS PRN   • artificial tears 1.4 % ophthalmic solution 1 Drop PRN   • benzocaine-menthol (CEPACOL) lozenge 1 Lozenge Q2HRS PRN   • hydrALAZINE (APRESOLINE) tablet 25 mg Q8HRS PRN   • mag hydrox-al hydrox-simeth (MAALOX PLUS ES or MYLANTA DS) suspension 20 mL Q2HRS PRN   • ondansetron (ZOFRAN ODT) dispertab 4 mg 4X/DAY PRN    Or   • ondansetron (ZOFRAN) syringe/vial injection 4 mg 4X/DAY PRN   • traZODone (DESYREL) tablet 50 mg QHS PRN   • sodium chloride (OCEAN) 0.65 % nasal spray 2 Spray PRN   • benzocaine-menthol (CEPACOL) lozenge 1 Lozenge Q2HRS PRN   • calcium carbonate (TUMS) chewable tab 500 mg BID   • docusate sodium (COLACE) capsule 100 mg BID   • tizanidine (ZANAFLEX) tablet 2 mg TID PRN   • enoxaparin (LOVENOX) inj 40 mg QHS   • gabapentin (NEURONTIN) capsule 600 mg TID   • hydroCHLOROthiazide (HYDRODIURIL) tablet 25 mg DAILY   • lisinopril (PRINIVIL) tablet 20 mg DAILY   • pregabalin (LYRICA) capsule 300 mg DAILY   • vitamin D (cholecalciferol) tablet 2,000 Units DAILY       Orders Placed This Encounter   Procedures   • Diet Order Regular     Standing Status:   Standing     Number of Occurrences:   1     Order Specific Question:   Diet:     Answer:   Regular [1]       Assessment:  Active Hospital Problems    Diagnosis   • *Lumbar stenosis with neurogenic claudication   • HTN (hypertension)   • High cholesterol   • Pain   • Bowel habit changes       Medical Decision Making and Plan:  Severe lumbar stenosis w cauda equina - Patient with pre-operative bowel/bladder changes as well as decreased sensation and bilateral foot drop suggestive of cauda equina.  Patient is s/p L2-L5 decompression and posterior fusion with Dr. Perez on 7/18/18.  Since surgery patient has improved with bowel/bladder but continues to have weakness and decreased sensation.  -LSO when OOB with spinal precautions  -PT and OT to evaluate and treat mobility  and ADLs.  Patient would benefit from custom articulate ankle-foot orthotics for in home mobility and ADLs.  Patient received custom AFOs and has begun training with them.    -Follow-up with Dr. Perez' clinic 2 weeks and 6 weeks post-discharge     Neurogenic Bowel/Bladder - Patient with constipation and urinary incontinence prior to surgery. Appears to be improved but will need to monitor.    -Continue scheduled Senna-Docusate.  -Will need to check PVRs. Bladder scans 52 and 64 after voiding. Can discontinue as improving sensation and minimal residuals     Pain - Patient with both neuropathic and post-operative pain.  Discussed need to reduce opiate use.  Currently on Gabapentin 600 mg TID and Lyrica 300 mg daily.  Oxycodone and Tylenol PRN.    -Utilizing significant Oxycodone dosage, discussed decreased reliance. Increase Gabapentin to 900 mg TID to reduce opiate need.     HTN/CV - Patient on HCTZ 25 mg daily, Lisinopril 20 mg daily, Diltiazem  mg daily. SBP remains controlled.      Vitamin D - On supplementation, admission labs within normal limits. Continue 2000 daily     GI Ppx - Patient on prilosec      DVT Ppx - Patient on Lovenox on transfer. Will monitor for ambulation. Patient ambulating 110 feet, goal of 125. Stop YENI hoses as discomfort and close to ambulation.      Total time:  30 minutes.  I spent greater than 50% of the time for patient care, counseling, and coordination on this date, including unit/floor time, and face-to-face time with the patient as per interval events and assessment and plan above. Topics discussed included AFOs, NSG recommendations for wound/incision reviewed, and increase gabapentin.     Coni oHpe M.D.

## 2018-07-31 NOTE — REHAB-PHARMACY IDT TEAM NOTE
Pharmacy   Pharmacy  Antibiotics/Antifungals/Antivirals:  Medication:      Active Orders     None        Route:         n/a  Stop Date:  n/a  Reason:   Antihypertensives/Cardiac:  Medication:    Orders (72h ago through future)    Start     Ordered    07/25/18 0900  DILTIAZem CD (CARDIZEM CD) capsule 240 mg  DAILY     Comments:  This medication has been substituted for Tiaztia XT per P&T Therapeutic Substitution Protocol.    07/24/18 1225    07/24/18 0900  hydroCHLOROthiazide (HYDRODIURIL) tablet 25 mg  DAILY      07/23/18 1753    07/24/18 0900  lisinopril (PRINIVIL) tablet 20 mg  DAILY      07/23/18 1753    07/23/18 1753  hydrALAZINE (APRESOLINE) tablet 25 mg  EVERY 8 HOURS PRN      07/23/18 1753        Patient Vitals for the past 24 hrs:   BP Pulse   07/31/18 0857 118/70 74   07/31/18 0851 100/60 74   07/31/18 0700 (!) 91/61 60   07/30/18 1900 112/70 84   07/30/18 1300 100/62 92       Anticoagulation:  Medication:  Lovenox  INR:      Other key medications: omeprazole  Section completed by:  Renu Garcia [facility]

## 2018-07-31 NOTE — CARE PLAN
Problem: Communication  Goal: The ability to communicate needs accurately and effectively will improve  Pt is alert and oriented. Able to verbalize needs without difficulty.     Problem: Pain Management  Goal: Pain level will decrease to patient's comfort goal  Pt given Roxicodone and Zanaflex for back and lower extremities pain. Repositioned pt comfortably in bed. Pillows provided for comfort. Will continue to monitor

## 2018-07-31 NOTE — PROGRESS NOTES
Patient is AOx4 has good safety awareness and uses the call light when assistance is needed. Patient states her pain is controlled at intervals with scheduled and prn pain medication, patient has been up participating in therapies.

## 2018-07-31 NOTE — PROGRESS NOTES
1915 Received report from day shift RN and assume plan of care. Patient alert and oriented. Denies any pain or discomfort at this time. Positioned comfortably in bed. Call light within reach. Safety precautions in place. For continuity of care.

## 2018-07-31 NOTE — CARE PLAN
Problem: Pain Management  Goal: Pain level will decrease to patient's comfort goal    Intervention: Follow pain managment plan developed in collaboration with patient and Interdisciplinary Team  Patient states she has pain that is above a 5 most of the time despite medication. Patient medicated with prn pain management, calm and resting in bed.      Problem: Skin Integrity  Goal: Risk for impaired skin integrity will decrease    Intervention: Assess risk factors for impaired skin integrity and/or pressure ulcers  Staples were removed today at Dr appointment, Patient has dry gauze in place with paper tape. Area assessed by this RN upon return and skin is approximated with scabbing at site. Dressing on for protection while in therapies and for comfort so site does not rub on brace.

## 2018-07-31 NOTE — REHAB-CM IDT TEAM NOTE
Case Management    DC Planning  DC destination/dispostion:  To return to her one story handicap assessable apartment where patient lives with her ex- in [place], [place]    Referrals: will need f/u with pcp , Dr. Pfeiffer and Dr. Perez, neurosurgery    DC Needs: has a 4ww, spc and bs commode at home, she states her sister has a hospital bed that she can use when she goes home if needed.     Barriers to discharge:   Lives with ex- who has dementia    Strengths: has her sister, Elaine who helps her and helps with her ex- also.     Section completed by:  Gabriella Cook R.N.

## 2018-07-31 NOTE — DISCHARGE PLANNING
Case Management  Met with patient to discuss discharge plans and IDT conference for tomorrow.  Mentioned to her about the vm  I received from her boyfriend Antonia that she was not being treated well and she said that he must have misunderstood her txt message from the other night because she has had no problems or complaints about any care or caregivers here.  Continue to follow.

## 2018-08-01 PROCEDURE — 97110 THERAPEUTIC EXERCISES: CPT

## 2018-08-01 PROCEDURE — 99233 SBSQ HOSP IP/OBS HIGH 50: CPT | Performed by: PHYSICAL MEDICINE & REHABILITATION

## 2018-08-01 PROCEDURE — A9270 NON-COVERED ITEM OR SERVICE: HCPCS | Performed by: PHYSICAL MEDICINE & REHABILITATION

## 2018-08-01 PROCEDURE — 700111 HCHG RX REV CODE 636 W/ 250 OVERRIDE (IP): Performed by: PHYSICAL MEDICINE & REHABILITATION

## 2018-08-01 PROCEDURE — 97535 SELF CARE MNGMENT TRAINING: CPT

## 2018-08-01 PROCEDURE — 770010 HCHG ROOM/CARE - REHAB SEMI PRIVAT*

## 2018-08-01 PROCEDURE — 700102 HCHG RX REV CODE 250 W/ 637 OVERRIDE(OP): Performed by: PHYSICAL MEDICINE & REHABILITATION

## 2018-08-01 PROCEDURE — 97116 GAIT TRAINING THERAPY: CPT

## 2018-08-01 PROCEDURE — 700112 HCHG RX REV CODE 229: Performed by: PHYSICAL MEDICINE & REHABILITATION

## 2018-08-01 PROCEDURE — 97530 THERAPEUTIC ACTIVITIES: CPT

## 2018-08-01 RX ORDER — OXYCODONE HYDROCHLORIDE 10 MG/1
10 TABLET ORAL EVERY 4 HOURS PRN
Status: DISCONTINUED | OUTPATIENT
Start: 2018-08-01 | End: 2018-08-10 | Stop reason: HOSPADM

## 2018-08-01 RX ORDER — OXYCODONE HYDROCHLORIDE 5 MG/1
15 TABLET ORAL EVERY 4 HOURS PRN
Status: DISCONTINUED | OUTPATIENT
Start: 2018-08-01 | End: 2018-08-10 | Stop reason: HOSPADM

## 2018-08-01 RX ADMIN — OMEPRAZOLE 20 MG: 20 CAPSULE, DELAYED RELEASE ORAL at 08:13

## 2018-08-01 RX ADMIN — CALCIUM CARBONATE (ANTACID) CHEW TAB 500 MG 500 MG: 500 CHEW TAB at 19:54

## 2018-08-01 RX ADMIN — GABAPENTIN 900 MG: 300 CAPSULE ORAL at 08:13

## 2018-08-01 RX ADMIN — SENNOSIDES AND DOCUSATE SODIUM 1 TABLET: 8.6; 5 TABLET ORAL at 08:13

## 2018-08-01 RX ADMIN — DOCUSATE SODIUM 100 MG: 100 CAPSULE, LIQUID FILLED ORAL at 19:54

## 2018-08-01 RX ADMIN — OXYCODONE HYDROCHLORIDE 10 MG: 10 TABLET ORAL at 04:25

## 2018-08-01 RX ADMIN — PREGABALIN 300 MG: 100 CAPSULE ORAL at 08:13

## 2018-08-01 RX ADMIN — ENOXAPARIN SODIUM 40 MG: 100 INJECTION SUBCUTANEOUS at 19:54

## 2018-08-01 RX ADMIN — DOCUSATE SODIUM 100 MG: 100 CAPSULE, LIQUID FILLED ORAL at 08:14

## 2018-08-01 RX ADMIN — HYDROCHLOROTHIAZIDE 25 MG: 25 TABLET ORAL at 08:13

## 2018-08-01 RX ADMIN — DILTIAZEM HYDROCHLORIDE 240 MG: 120 CAPSULE, COATED, EXTENDED RELEASE ORAL at 08:13

## 2018-08-01 RX ADMIN — CALCIUM CARBONATE (ANTACID) CHEW TAB 500 MG 500 MG: 500 CHEW TAB at 08:13

## 2018-08-01 RX ADMIN — OXYCODONE HYDROCHLORIDE 10 MG: 10 TABLET ORAL at 21:36

## 2018-08-01 RX ADMIN — GABAPENTIN 900 MG: 300 CAPSULE ORAL at 13:37

## 2018-08-01 RX ADMIN — LISINOPRIL 20 MG: 20 TABLET ORAL at 08:14

## 2018-08-01 RX ADMIN — OXYCODONE HYDROCHLORIDE 10 MG: 10 TABLET ORAL at 17:21

## 2018-08-01 RX ADMIN — VITAMIN D, TAB 1000IU (100/BT) 2000 UNITS: 25 TAB at 08:13

## 2018-08-01 RX ADMIN — OXYCODONE HYDROCHLORIDE 10 MG: 10 TABLET ORAL at 08:17

## 2018-08-01 RX ADMIN — OXYCODONE HYDROCHLORIDE 10 MG: 10 TABLET ORAL at 13:38

## 2018-08-01 RX ADMIN — GABAPENTIN 900 MG: 300 CAPSULE ORAL at 19:54

## 2018-08-01 ASSESSMENT — GAIT ASSESSMENTS
DISTANCE (FEET): 150
ASSISTIVE DEVICE: 4 WHEEL WALKER
ASSISTIVE DEVICE: 4 WHEEL WALKER
GAIT LEVEL OF ASSIST: STAND BY ASSIST
GAIT LEVEL OF ASSIST: STAND BY ASSIST
DISTANCE (FEET): 150

## 2018-08-01 ASSESSMENT — PAIN SCALES - GENERAL
PAINLEVEL_OUTOF10: 5
PAINLEVEL_OUTOF10: 6
PAINLEVEL_OUTOF10: 7

## 2018-08-01 NOTE — CARE PLAN
Problem: Bathing  Goal: STG-Within one week, patient will bathe  1) Individualized Goal:  Min assist w/ AE/DME PRN  2) Interventions:  OT Self Care/ADL, OT Neuro Re-Ed/Balance, OT Therapeutic Activity, OT Evaluation and OT Therapeutic Exercise     Outcome: MET Date Met: 08/01/18  Pt at Sup level for shower activity w/ AE/DME    Problem: Functional Transfers  Goal: STG-Within one week, patient will transfer to toilet  1) Individualized Goal: Sup/SBA w/ DME PRN  2) Interventions:   OT Self Care/ADL, OT Neuro Re-Ed/Balance, OT Therapeutic Activity, OT Evaluation and OT Therapeutic Exercise     Outcome: MET Date Met: 08/01/18  Pt at Sup level for commode transfer via grab bar

## 2018-08-01 NOTE — DISCHARGE PLANNING
Case Management  Met with patient about outcomes from the IDT conference from today.  Aware the dc date has changed to 8/10/2018 and she is agreeable. Agreeable to outpatient PT in [place] at discharge. Continue to follow.

## 2018-08-01 NOTE — CARE PLAN
Problem: Safety  Goal: Will remain free from injury  Patient demonstrates good safety technique this shift.  Asks for assistance when needed and does not attempt self transfer.  Able to verbalize needs.  Will continue to monitor.    Problem: Pain Management  Goal: Pain level will decrease to patient's comfort goal  Patient reports satisfactory pain control and decrease intensity after pharmacological pain management.    Problem: Skin Integrity  Goal: Risk for impaired skin integrity will decrease  Patient's skin remains intact and free from new or accidental injury this shift.  Will continue to monitor.

## 2018-08-01 NOTE — PROGRESS NOTES
Received shift report and assumed care of patient.  Patient awake, calm and stable, currently positioned in bed for comfort and safety; call light within reach. Stated a pain level of 6 at this time. Will continue to monitor.

## 2018-08-01 NOTE — CARE PLAN
Problem: Balance  Goal: STG-Within one week, patient will maintain static standing  1) Individualized goal:  Unsupported for 1 min with SPV  2) Interventions:  PT Gait Training, PT Therapeutic Exercises, PT Neuro Re-Ed/Balance and PT Therapeutic Activity     Outcome: NOT MET      Problem: Mobility  Goal: STG-Within one week, patient will ambulate household distance  1) Individualized goal:  With 4WW SPV without increased pain  2) Interventions: PT Gait Training, PT Therapeutic Exercises, PT Neuro Re-Ed/Balance and PT Therapeutic Activity       Outcome: PROGRESSING AS EXPECTED  150' SBA/SPV with 4WW  Goal: STG-Within one week, patient will ambulate up/down a curb  1) Individualized goal:  With 4WW with CGA  2) Interventions:  PT Gait Training, PT Therapeutic Exercises, PT Neuro Re-Ed/Balance and PT Therapeutic Activity       Outcome: NOT MET      Problem: Mobility Transfers  Goal: STG-Within one week, patient will perform bed mobility  1) Individualized goal:  SPV with flat bed, maintaining spinal precautions  2) Interventions: PT Gait Training, PT Therapeutic Exercises, PT Neuro Re-Ed/Balance and PT Therapeutic Activity       Outcome: PROGRESSING AS EXPECTED  SPV bed mobility, unclear if bed flat.  Will continue to address.  Goal: STG-Within one week, patient will transfer bed to chair  1) Individualized goal:  With 4WW SPV  2) Interventions:  PT Gait Training, PT Therapeutic Exercises, PT Neuro Re-Ed/Balance and PT Therapeutic Activity       Outcome: MET Date Met: 08/01/18      Problem: PT-Long Term Goals  Goal: LTG-By discharge, patient will tolerate standing  1) Individualized goal:  5 min without increased pain  2) Interventions: PT Gait Training, PT Therapeutic Exercises, PT Neuro Re-Ed/Balance and PT Therapeutic Activity       Outcome: PROGRESSING AS EXPECTED    Goal: LTG-By discharge, patient will ambulate  1) Individualized goal:  With 4WW 500' Lori  2) Interventions: PT Gait Training, PT Therapeutic  Exercises, PT Neuro Re-Ed/Balance and PT Therapeutic Activity       Outcome: NOT MET    Goal: LTG-By discharge, patient will transfer one surface to another  1) Individualized goal:  With 4WW Lori  2) Interventions:  PT Gait Training, PT Therapeutic Exercises, PT Neuro Re-Ed/Balance and PT Therapeutic Activity       Outcome: NOT MET    Goal: LTG-By discharge, patient will transfer in/out of a car  1) Individualized goal:  With 4WW Lori, maintaining spinal precautions  2) Interventions:  PT Gait Training, PT Therapeutic Exercises, PT Neuro Re-Ed/Balance and PT Therapeutic Activity       Outcome: NOT MET    Goal: LTG-By discharge, patient will  1) Individualized goal:  Ascend/descend curb with 4WW SPV  2) Interventions:  PT Gait Training, PT Therapeutic Exercises, PT Neuro Re-Ed/Balance and PT Therapeutic Activity       Outcome: NOT MET

## 2018-08-01 NOTE — REHAB-OT IDT TEAM NOTE
Occupational Therapy   Activities of Daily Living  Eating Initial:  7 - Independent  Eating Current:  6 - Modified Independent   Eating Description:  Insert dentures  Grooming Initial:  6 - Modified Independent  Grooming Current:  6 - Modified Independent   Grooming Description:  Increased time  Bathing Initial:  3 - Moderate Assistance  Bathing Current:  5 - Standby Prompting/Supervision or Set-up   Bathing Description:  Grab bar, Tub bench, Long handled bath tool, Hand held shower, Supervision for safety, Increased time, Set-up of equipment  Upper Body Dressing Initial:  4 - Minimal Assistance  Upper Body Dressing Current:  5 - Standby Prompting/Supervision or Set-up   Upper Body Dressing Description:  Increased time (Mod Indep to don/doff short, Max assist to don LSO)  Lower Body Dressing Initial:  1 - Total Assistance  Lower Body Dressing Current:  5 - Standby Prompting/Supervsion or Set-up   Lower Body Dressing Description:  5 - Standby Prompting/Supervsion or Set-up  Toileting Initial:  1 - Total Assistance  Toileting Current:  3 - Moderate Assistance   Toileting Description:  Grab bar, Increased time, Supervision for safety, Set-up of equipment  Toilet Transfer Initial:  4 - Minimal Assistance  Toilet Transfer Current:  5 - Standby Prompting/Supervision or Set-up   Toilet Transfer Description:  5 - Standby Prompting/Supervision or Set-up  Tub / Shower Transfer Initial:  4 - Minimal Assistance  Tub / Shower Transfer Current:  5 - Standby Prompting/Supervision or Set-up   Tub / Shower Transfer Description:  Grab bar, Increased time, Supervision for safety, Verbal cueing, Set-up of equipment, Initial preparation for task (SPT to/from manual wc and shower chair via grab bar)  IADL:  TBD   Family Training/Education:  TBD   DME/DC Recommendations:  TBD     Strengths:  Independent PLOF, Motivated for self care and independence, Pleasant and cooperative and Willingly participates in therapeutic activities  Barriers:   Decreased endurance, Generalized weakness and Limited mobility     # of short term goals set= 5    # of short term goals met= 5          Occupational Therapy Goals           Problem: Bathing     Dates: Start: 07/24/18         Problem: Functional Transfers     Dates: Start: 07/24/18         Problem: OT Long Term Goals     Dates: Start: 07/24/18       Goal: LTG-By discharge, patient will complete basic self care tasks     Dates: Start: 07/24/18       Description: 1) Individualized Goal:  Mod Indep w/ AE/DME PRN  2) Interventions:   OT Self Care/ADL, OT Neuro Re-Ed/Balance, OT Therapeutic Activity, OT Evaluation and OT Therapeutic Exercise             Goal: LTG-By discharge, patient will perform bathroom transfers     Dates: Start: 07/24/18       Description: 1) Individualized Goal: Mod Indep w/ DME PRN  2) Interventions:   OT Self Care/ADL, OT Neuro Re-Ed/Balance, OT Therapeutic Activity, OT Evaluation and OT Therapeutic Exercise                   Section completed by:  Cali Murdock MS,OTR/L

## 2018-08-01 NOTE — CARE PLAN
Problem: Communication  Goal: The ability to communicate needs accurately and effectively will improve  Pt is alert and oriented. Able to verbalize needs without difficulty. Encouraged to inform staff for assistance.     Problem: Skin Integrity  Goal: Risk for impaired skin integrity will decrease  Back incision healing well. No s/sx of infection noted. Dressing change after shower with dry gauze and paper tape per pt's request. Will monitor for infection.

## 2018-08-01 NOTE — REHAB-COLLABORATIVE ONGOING IDT TEAM NOTE
Weekly Interdisciplinary Team Conference Note    Weekly Interdisciplinary Team Conference # 2  Date:  8/1/2018    Clinicians present and reporting at team conference include the following:   MD: Coni Hope MD   RN:  Priscila Cobb RN   PT:   Other:Lula Avendano PT  OT:  Cali Murdock MS OTR/L   ST:  Not Applicable  CM:  Gabriella Cook RN  REC:  None  RT:  None  RPh:  Bebeto Snell [facility]  Other:   None  All reporting clinicians have a working knowledge of this patient's plan of care.    Targeted DC Date:  8/10/2018     Medical    Patient Active Problem List    Diagnosis Date Noted   • HTN (hypertension) 07/24/2018   • High cholesterol 07/24/2018   • Pain 07/24/2018   • Bowel habit changes 07/24/2018   • Lumbar stenosis with neurogenic claudication 07/20/2018   • Arthralgia of knee, right 04/10/2017   • Essential hypertension 03/27/2017   • Family history of coronary artery disease 03/13/2017   • Articular cartilage disorder, pelvic region and thigh 12/05/2013   • Disability examination 05/09/2012     Results     ** No results found for the last 24 hours. **           Nursing  Diet/Nutrition:  Regular and Thin Liquids  Medication Administration:  Whole with Liquid Wash  % consumed at meals in last 24 hours:    Meal/Snack Consumption for the past 24 hrs:   Oral Nutrition   07/31/18 0904 Breakfast;Between % Consumed       Snack schedule:  None  Appetite:  Excellent  Fluid Intake/Output in past 24 hours: In: 1300 [P.O.:1300]  Out: -   Admit Weight:  Weight: 90.7 kg (200 lb)  Weight Last 7 Days   [89.5 kg (197 lb 5 oz)] 89.5 kg (197 lb 5 oz) (07/29 0500)    Pain Issues:    Location:  Back;Leg (07/31 2355)  Right;Left;Lower;Posterior (07/31 2355)         Severity:  Moderate   Scheduled pain medications:  gabapentin (NEURONTIN)      PRN pain medications used in last 24 hours:  oxycodone immediate release (ROXICODONE)    Non Pharmacologic Interventions:  distraction, emotional support, relaxation and  repositioned  Effectiveness of pain management plan:  good=patient states acceptable comfort after interventions    Bowel:    Bowel Assist Initial Score:  3 - Moderate Assistance  Bowel Assist Current Score:  4 - Minimal Assistance  Bowl Accidents in last 7 days:     Last bowel movement: 07/31/18  Stool Description: Small     Usual bowel pattern:  daily  Scheduled bowel medications:  docusate sodium (COLACE) and senna-docusate (PERICOLACE or SENOKOT S)   PRN bowel medications used in last 24 hours:  None  Nursing Interventions:  Increased time, Scheduled medication, Verbal cueing, Brief  Effectiveness of bowel program:   good=regular, predictable, controlled emptying of bowel  Bladder:    Bladder Assist Initial Score:  3 - Moderate Assistance  Bladder Assist Current Score:  4 - Minimal Assistance  Bladder Accidents in last 7 days:     Medications affecting bladder:  None    Time void schedule/voiding pattern:  Voiding every 2-4 hours  Interventions:  Increased time, Verbal cueing, Time void patient initiated, Brief  Effectiveness of bladder training:  Good=regular, predictable, emptying of bladder, patient initiates time voiding      Wound:         Patient Lines/Drains/Airways Status    Active Current Wounds     Name: Placement date: Placement time: Site: Days:    Surgical Incision  Incision Back 07/17/18   1010      14                   Interventions:  [place] dc'd - 7/31. Monitoring for s/sx of infection  Effectiveness of intervention:  wound is improving         Sleep/wake cycle:   Average hours slept:  Sleeps 3-4 hours without waking  Sleep medication usage:  None    Patient/Family Training/Education:  Fall Prevention, General Self Care, Pain Management, Safe Transfers, Safety, Skin Care and Wound Care    Strengths: Alert and oriented, Able to follow instructions, Supportive family, Pleasant and cooperative, Good carryover of learning and Motivated for self care and independence   Barriers:   Generalized  weakness and Limited mobility            Nursing Problems           Problem: Bowel/Gastric:     Goal: Normal bowel function is maintained or improved     Flowsheet:     Taken at 07/29/18 1945    Last BM 07/29/18 by Charlee Miranda, C.N.A.                Goal: Will not experience complications related to bowel motility             Problem: Communication     Goal: The ability to communicate needs accurately and effectively will improve             Problem: Discharge Barriers/Planning     Goal: Patient's continuum of care needs will be met             Problem: Infection     Goal: Will remain free from infection             Problem: Knowledge Deficit     Goal: Knowledge of disease process/condition, treatment plan, diagnostic tests, and medications will improve           Goal: Knowledge of the prescribed therapeutic regimen will improve             Problem: Mobility     Goal: Risk for activity intolerance will decrease             Problem: Pain Management     Goal: Pain level will decrease to patient's comfort goal             Problem: Respiratory:     Goal: Respiratory status will improve             Problem: Safety     Goal: Will remain free from injury           Goal: Will remain free from falls             Problem: Skin Integrity     Goal: Risk for impaired skin integrity will decrease             Problem: Venous Thromboembolism (VTW)/Deep Vein Thrombosis (DVT) Prevention:     Goal: Patient will participate in Venous Thrombosis (VTE)/Deep Vein Thrombosis (DVT)Prevention Measures                  Long Term Goals:   At discharge patient will be able to function safely at home and in the community with support.    Section completed by:  Liana Torres R.N.              Mobility  Bed mobility:   SPV  Bed /Chair/Wheelchair Transfer Initial:  3 - Moderate Assistance  Bed /Chair/Wheelchair Transfer Current:  5 - Standby Prompting/Supervision or Set-up   Bed/Chair/Wheelchair Transfer Description:  Supervision for  safety  Walk Initial:  1 - Total Assistance  Walk Current:  5 - Standby Prompting/Supervision or Set-up   Walk Description:  Supervision for safety, Verbal cueing, Walker, AFO, Extra time (300' x 2 4WW SBA B AFO, vc for posture)  Wheelchair Initial:  1 - Total Assistance  Wheelchair Current:  6 - Modified Independent   Wheelchair Description:  Extra time  Stairs Initial:  2 - Max Assistance  Stairs Current: 2 - Max Assistance   Stairs Description:  (4 stairs with BHRs, CGA)  Patient/Family Training/Education:  Pt would like her boyfriend to receive training.  DME/DC Recommendations:  4WW, home with OP PT  Strengths:  Good insight into deficits/needs, Making steady progress towards goals, Motivated for self care and independence and Pleasant and cooperative  Barriers:   Generalized weakness, Poor activity tolerance and Poor balance  # of short term goals set= 5  # of short term goals met=1       Physical Therapy Problems           Problem: Balance     Dates: Start: 07/24/18       Goal: STG-Within one week, patient will maintain static standing     Dates: Start: 07/24/18       Description: 1) Individualized goal:  Unsupported for 1 min with SPV  2) Interventions:  PT Gait Training, PT Therapeutic Exercises, PT Neuro Re-Ed/Balance and PT Therapeutic Activity               Problem: Mobility     Dates: Start: 07/24/18       Goal: STG-Within one week, patient will ambulate household distance     Dates: Start: 07/24/18       Description: 1) Individualized goal:  With 4WW SPV without increased pain  2) Interventions: PT Gait Training, PT Therapeutic Exercises, PT Neuro Re-Ed/Balance and PT Therapeutic Activity         Note:     Goal Note filed on 08/01/18 1139 by Lula Avendano, PT    Goal: STG-Within one week, patient will ambulate household distance  Outcome: PROGRESSING AS EXPECTED  150' SBA/SPV with 4WW                Goal: STG-Within one week, patient will ambulate up/down a curb     Dates: Start: 07/24/18        Description: 1) Individualized goal:  With 4WW with CGA  2) Interventions:  PT Gait Training, PT Therapeutic Exercises, PT Neuro Re-Ed/Balance and PT Therapeutic Activity                 Problem: Mobility Transfers     Dates: Start: 07/24/18       Goal: STG-Within one week, patient will perform bed mobility     Dates: Start: 07/24/18       Description: 1) Individualized goal:  SPV with flat bed, maintaining spinal precautions  2) Interventions: PT Gait Training, PT Therapeutic Exercises, PT Neuro Re-Ed/Balance and PT Therapeutic Activity         Note:     Goal Note filed on 08/01/18 1139 by Lula Avendano, PT    Goal: STG-Within one week, patient will perform bed mobility  Outcome: PROGRESSING AS EXPECTED  SPV bed mobility, unclear if bed flat.  Will continue to address.                  Problem: PT-Long Term Goals     Dates: Start: 07/24/18       Goal: LTG-By discharge, patient will tolerate standing     Dates: Start: 07/24/18       Description: 1) Individualized goal:  5 min without increased pain  2) Interventions: PT Gait Training, PT Therapeutic Exercises, PT Neuro Re-Ed/Balance and PT Therapeutic Activity               Goal: LTG-By discharge, patient will ambulate     Dates: Start: 07/24/18       Description: 1) Individualized goal:  With 4WW 500' Lori  2) Interventions: PT Gait Training, PT Therapeutic Exercises, PT Neuro Re-Ed/Balance and PT Therapeutic Activity               Goal: LTG-By discharge, patient will transfer one surface to another     Dates: Start: 07/24/18       Description: 1) Individualized goal:  With 4WW Lori  2) Interventions:  PT Gait Training, PT Therapeutic Exercises, PT Neuro Re-Ed/Balance and PT Therapeutic Activity               Goal: LTG-By discharge, patient will transfer in/out of a car     Dates: Start: 07/24/18       Description: 1) Individualized goal:  With 4WW Lori, maintaining spinal precautions  2) Interventions:  PT Gait Training, PT Therapeutic Exercises, PT Neuro  Re-Ed/Balance and PT Therapeutic Activity               Goal: LTG-By discharge, patient will     Dates: Start: 07/24/18       Description: 1) Individualized goal:  Ascend/descend curb with 4WW SPV  2) Interventions:  PT Gait Training, PT Therapeutic Exercises, PT Neuro Re-Ed/Balance and PT Therapeutic Activity                       Section completed by:  Lula Avendano, PT       Activities of Daily Living  Eating Initial:  7 - Independent  Eating Current:  6 - Modified Independent   Eating Description:  Insert dentures  Grooming Initial:  6 - Modified Independent  Grooming Current:  6 - Modified Independent   Grooming Description:  Increased time  Bathing Initial:  3 - Moderate Assistance  Bathing Current:  5 - Standby Prompting/Supervision or Set-up   Bathing Description:  Grab bar, Tub bench, Long handled bath tool, Hand held shower, Supervision for safety, Increased time, Set-up of equipment  Upper Body Dressing Initial:  4 - Minimal Assistance  Upper Body Dressing Current:  5 - Standby Prompting/Supervision or Set-up   Upper Body Dressing Description:  Increased time (Mod Indep to don/doff short, Max assist to don LSO)  Lower Body Dressing Initial:  1 - Total Assistance  Lower Body Dressing Current:  5 - Standby Prompting/Supervsion or Set-up   Lower Body Dressing Description:  5 - Standby Prompting/Supervsion or Set-up  Toileting Initial:  1 - Total Assistance  Toileting Current:  3 - Moderate Assistance   Toileting Description:  Grab bar, Increased time, Supervision for safety, Set-up of equipment  Toilet Transfer Initial:  4 - Minimal Assistance  Toilet Transfer Current:  5 - Standby Prompting/Supervision or Set-up   Toilet Transfer Description:  5 - Standby Prompting/Supervision or Set-up  Tub / Shower Transfer Initial:  4 - Minimal Assistance  Tub / Shower Transfer Current:  5 - Standby Prompting/Supervision or Set-up   Tub / Shower Transfer Description:  Grab bar, Increased time, Supervision for safety,  Verbal cueing, Set-up of equipment, Initial preparation for task (SPT to/from manual wc and shower chair via grab bar)  IADL:  TBD   Family Training/Education:  TBD   DME/DC Recommendations:  TBD     Strengths:  Independent PLOF, Motivated for self care and independence, Pleasant and cooperative and Willingly participates in therapeutic activities  Barriers:  Decreased endurance, Generalized weakness and Limited mobility     # of short term goals set= 5    # of short term goals met= 5          Occupational Therapy Goals           Problem: Bathing     Dates: Start: 07/24/18         Problem: Functional Transfers     Dates: Start: 07/24/18         Problem: OT Long Term Goals     Dates: Start: 07/24/18       Goal: LTG-By discharge, patient will complete basic self care tasks     Dates: Start: 07/24/18       Description: 1) Individualized Goal:  Mod Indep w/ AE/DME PRN  2) Interventions:   OT Self Care/ADL, OT Neuro Re-Ed/Balance, OT Therapeutic Activity, OT Evaluation and OT Therapeutic Exercise             Goal: LTG-By discharge, patient will perform bathroom transfers     Dates: Start: 07/24/18       Description: 1) Individualized Goal: Mod Indep w/ DME PRN  2) Interventions:   OT Self Care/ADL, OT Neuro Re-Ed/Balance, OT Therapeutic Activity, OT Evaluation and OT Therapeutic Exercise                   Section completed by:  Clai Murdock MS,OTR/L             Nutrition       Dietary Problems (Active)            There are no active problems.      Nutrition services: Day 3 of admit.  Sari Ceja is a 53 y.o. female with admitting DX of Other Orthopedic/Lumbar Stenosis with Neurogenic Caludication.     Consult received for supplements.     Pt with PMH of Unspec. Hemorrhagic conditions, renal disorder, HTN, Bowel Habit changes, and Arthritis. Admitted for decreased function after L2-L5 decompression and laminectomies. Reviewed chart. Current diet order Diabetic and PO of % x 3 meals and 50-75% x 1 and  "sufficient. Note on 7/25 A1C of 5.8 and at DM goal of <7.0. RD available and consult as needed.       Assessment:  Height: 165.1 cm (5' 5\")  Weight: 90.7 kg (200 lb)  Body mass index is 33.28 kg/m².  Diet/Intake: Diabetic     Labs, MAR and Chart Reviewed     Diagnosis: No nutritional dx at this time     Recommendations/Plan:  1. Diet as ordered.   2. Encourage intake of 50% or greater  3. Document intake of all meals  as % taken in ADL's to provide interdisciplinary communication across all shifts.   4. Monitor weight.  5. Nutrition rep will continue to see patient for ongoing meal and snack preferences.     Section completed by:  Smitha Jarrett    REHAB-Pharmacy IDT Team Note by Renu Garcia RPH at 7/31/2018 12:51 PM  Version 1 of 1    Author:  Renu Garcia RPH Service:  (none) Author Type:  Pharmacist    Filed:  7/31/2018 12:51 PM Date of Service:  7/31/2018 12:51 PM Status:  Signed    :  Renu Garcia RPH (Pharmacist)         Pharmacy   Pharmacy  Antibiotics/Antifungals/Antivirals:  Medication:      Active Orders     None        Route:         n/a  Stop Date:  n/a  Reason:   Antihypertensives/Cardiac:  Medication:    Orders (72h ago through future)    Start     Ordered    07/25/18 0900  DILTIAZem CD (CARDIZEM CD) capsule 240 mg  DAILY     Comments:  This medication has been substituted for Tiaztia XT per P&T Therapeutic Substitution Protocol.    07/24/18 1225    07/24/18 0900  hydroCHLOROthiazide (HYDRODIURIL) tablet 25 mg  DAILY      07/23/18 1753    07/24/18 0900  lisinopril (PRINIVIL) tablet 20 mg  DAILY      07/23/18 1753    07/23/18 1753  hydrALAZINE (APRESOLINE) tablet 25 mg  EVERY 8 HOURS PRN      07/23/18 1753        Patient Vitals for the past 24 hrs:   BP Pulse   07/31/18 0857 118/70 74   07/31/18 0851 100/60 74   07/31/18 0700 (!) 91/61 60   07/30/18 1900 112/70 84   07/30/18 1300 100/62 92       Anticoagulation:  Medication:  Lovenox  INR:      Other key medications: " omeprazole  Section completed by:  Renu Garcia [facility][TK.1]        Attribution Key     TK.1 - Renu Garcia [facility] on 7/31/2018 12:51 PM                    DC Planning  DC destination/dispostion:  To return to her one story handicap assessable apartment where patient lives with her ex- in [place], [place]    Referrals: will need f/u with pcp , Dr. Pfeiffer and Dr. Perez, neurosurgery    DC Needs: has a 4ww, spc and bs commode at home, she states her sister has a hospital bed that she can use when she goes home if needed.     Barriers to discharge:   Lives with ex- who has dementia    Strengths: has her sister, Elaine who helps her and helps with her ex- also.     Section completed by:  Gabriella Cook R.N.      Physician Summary  Coni Hope MD participated and led team conference discussion.

## 2018-08-01 NOTE — REHAB-NURSING IDT TEAM NOTE
Nursing   Nursing  Diet/Nutrition:  Regular and Thin Liquids  Medication Administration:  Whole with Liquid Wash  % consumed at meals in last 24 hours:    Meal/Snack Consumption for the past 24 hrs:   Oral Nutrition   07/31/18 0904 Breakfast;Between % Consumed       Snack schedule:  None  Appetite:  Excellent  Fluid Intake/Output in past 24 hours: In: 1300 [P.O.:1300]  Out: -   Admit Weight:  Weight: 90.7 kg (200 lb)  Weight Last 7 Days   [89.5 kg (197 lb 5 oz)] 89.5 kg (197 lb 5 oz) (07/29 0500)    Pain Issues:    Location:  Back;Leg (07/31 2355)  Right;Left;Lower;Posterior (07/31 2355)         Severity:  Moderate   Scheduled pain medications:  gabapentin (NEURONTIN)      PRN pain medications used in last 24 hours:  oxycodone immediate release (ROXICODONE)    Non Pharmacologic Interventions:  distraction, emotional support, relaxation and repositioned  Effectiveness of pain management plan:  good=patient states acceptable comfort after interventions    Bowel:    Bowel Assist Initial Score:  3 - Moderate Assistance  Bowel Assist Current Score:  4 - Minimal Assistance  Bowl Accidents in last 7 days:     Last bowel movement: 07/31/18  Stool Description: Small     Usual bowel pattern:  daily  Scheduled bowel medications:  docusate sodium (COLACE) and senna-docusate (PERICOLACE or SENOKOT S)   PRN bowel medications used in last 24 hours:  None  Nursing Interventions:  Increased time, Scheduled medication, Verbal cueing, Brief  Effectiveness of bowel program:   good=regular, predictable, controlled emptying of bowel  Bladder:    Bladder Assist Initial Score:  3 - Moderate Assistance  Bladder Assist Current Score:  4 - Minimal Assistance  Bladder Accidents in last 7 days:     Medications affecting bladder:  None    Time void schedule/voiding pattern:  Voiding every 2-4 hours  Interventions:  Increased time, Verbal cueing, Time void patient initiated, Brief  Effectiveness of bladder training:  Good=regular,  predictable, emptying of bladder, patient initiates time voiding      Wound:         Patient Lines/Drains/Airways Status    Active Current Wounds     Name: Placement date: Placement time: Site: Days:    Surgical Incision  Incision Back 07/17/18   1010      14                   Interventions:  [place] dc'd - 7/31. Monitoring for s/sx of infection  Effectiveness of intervention:  wound is improving         Sleep/wake cycle:   Average hours slept:  Sleeps 3-4 hours without waking  Sleep medication usage:  None    Patient/Family Training/Education:  Fall Prevention, General Self Care, Pain Management, Safe Transfers, Safety, Skin Care and Wound Care    Strengths: Alert and oriented, Able to follow instructions, Supportive family, Pleasant and cooperative, Good carryover of learning and Motivated for self care and independence   Barriers:   Generalized weakness and Limited mobility            Nursing Problems           Problem: Bowel/Gastric:     Goal: Normal bowel function is maintained or improved     Flowsheet:     Taken at 07/29/18 1945    Last BM 07/29/18 by Charlee Miranda, C.N.A.                Goal: Will not experience complications related to bowel motility             Problem: Communication     Goal: The ability to communicate needs accurately and effectively will improve             Problem: Discharge Barriers/Planning     Goal: Patient's continuum of care needs will be met             Problem: Infection     Goal: Will remain free from infection             Problem: Knowledge Deficit     Goal: Knowledge of disease process/condition, treatment plan, diagnostic tests, and medications will improve           Goal: Knowledge of the prescribed therapeutic regimen will improve             Problem: Mobility     Goal: Risk for activity intolerance will decrease             Problem: Pain Management     Goal: Pain level will decrease to patient's comfort goal             Problem: Respiratory:     Goal: Respiratory  status will improve             Problem: Safety     Goal: Will remain free from injury           Goal: Will remain free from falls             Problem: Skin Integrity     Goal: Risk for impaired skin integrity will decrease             Problem: Venous Thromboembolism (VTW)/Deep Vein Thrombosis (DVT) Prevention:     Goal: Patient will participate in Venous Thrombosis (VTE)/Deep Vein Thrombosis (DVT)Prevention Measures                  Long Term Goals:   At discharge patient will be able to function safely at home and in the community with support.    Section completed by:  Liana Torres R.N.

## 2018-08-01 NOTE — REHAB-PT IDT TEAM NOTE
Physical Therapy   Mobility  Bed mobility:   SPV  Bed /Chair/Wheelchair Transfer Initial:  3 - Moderate Assistance  Bed /Chair/Wheelchair Transfer Current:  5 - Standby Prompting/Supervision or Set-up   Bed/Chair/Wheelchair Transfer Description:  Supervision for safety  Walk Initial:  1 - Total Assistance  Walk Current:  5 - Standby Prompting/Supervision or Set-up   Walk Description:  Supervision for safety, Verbal cueing, Walker, AFO, Extra time (300' x 2 4WW SBA B AFO, vc for posture)  Wheelchair Initial:  1 - Total Assistance  Wheelchair Current:  6 - Modified Independent   Wheelchair Description:  Extra time  Stairs Initial:  2 - Max Assistance  Stairs Current: 2 - Max Assistance   Stairs Description:  (4 stairs with BHRs, CGA)  Patient/Family Training/Education:  Pt would like her boyfriend to receive training.  DME/DC Recommendations:  4WW, home with OP PT  Strengths:  Good insight into deficits/needs, Making steady progress towards goals, Motivated for self care and independence and Pleasant and cooperative  Barriers:   Generalized weakness, Poor activity tolerance and Poor balance  # of short term goals set= 5  # of short term goals met=1       Physical Therapy Problems           Problem: Balance     Dates: Start: 07/24/18       Goal: STG-Within one week, patient will maintain static standing     Dates: Start: 07/24/18       Description: 1) Individualized goal:  Unsupported for 1 min with SPV  2) Interventions:  PT Gait Training, PT Therapeutic Exercises, PT Neuro Re-Ed/Balance and PT Therapeutic Activity               Problem: Mobility     Dates: Start: 07/24/18       Goal: STG-Within one week, patient will ambulate household distance     Dates: Start: 07/24/18       Description: 1) Individualized goal:  With 4WW SPV without increased pain  2) Interventions: PT Gait Training, PT Therapeutic Exercises, PT Neuro Re-Ed/Balance and PT Therapeutic Activity         Note:     Goal Note filed on 08/01/18 1139 by  Lula Avendano, PT    Goal: STG-Within one week, patient will ambulate household distance  Outcome: PROGRESSING AS EXPECTED  150' SBA/SPV with 4WW                Goal: STG-Within one week, patient will ambulate up/down a curb     Dates: Start: 07/24/18       Description: 1) Individualized goal:  With 4WW with CGA  2) Interventions:  PT Gait Training, PT Therapeutic Exercises, PT Neuro Re-Ed/Balance and PT Therapeutic Activity                 Problem: Mobility Transfers     Dates: Start: 07/24/18       Goal: STG-Within one week, patient will perform bed mobility     Dates: Start: 07/24/18       Description: 1) Individualized goal:  SPV with flat bed, maintaining spinal precautions  2) Interventions: PT Gait Training, PT Therapeutic Exercises, PT Neuro Re-Ed/Balance and PT Therapeutic Activity         Note:     Goal Note filed on 08/01/18 1139 by Lula Avendano, PT    Goal: STG-Within one week, patient will perform bed mobility  Outcome: PROGRESSING AS EXPECTED  SPV bed mobility, unclear if bed flat.  Will continue to address.                  Problem: PT-Long Term Goals     Dates: Start: 07/24/18       Goal: LTG-By discharge, patient will tolerate standing     Dates: Start: 07/24/18       Description: 1) Individualized goal:  5 min without increased pain  2) Interventions: PT Gait Training, PT Therapeutic Exercises, PT Neuro Re-Ed/Balance and PT Therapeutic Activity               Goal: LTG-By discharge, patient will ambulate     Dates: Start: 07/24/18       Description: 1) Individualized goal:  With 4WW 500' Lori  2) Interventions: PT Gait Training, PT Therapeutic Exercises, PT Neuro Re-Ed/Balance and PT Therapeutic Activity               Goal: LTG-By discharge, patient will transfer one surface to another     Dates: Start: 07/24/18       Description: 1) Individualized goal:  With 4WW Lori  2) Interventions:  PT Gait Training, PT Therapeutic Exercises, PT Neuro Re-Ed/Balance and PT Therapeutic Activity                Goal: LTG-By discharge, patient will transfer in/out of a car     Dates: Start: 07/24/18       Description: 1) Individualized goal:  With 4WW Lori, maintaining spinal precautions  2) Interventions:  PT Gait Training, PT Therapeutic Exercises, PT Neuro Re-Ed/Balance and PT Therapeutic Activity               Goal: LTG-By discharge, patient will     Dates: Start: 07/24/18       Description: 1) Individualized goal:  Ascend/descend curb with 4WW SPV  2) Interventions:  PT Gait Training, PT Therapeutic Exercises, PT Neuro Re-Ed/Balance and PT Therapeutic Activity                       Section completed by:  Lula Avendano, PT

## 2018-08-01 NOTE — PROGRESS NOTES
"Rehab Progress Note     Encounter Date: 8/1/2018    CC: Decreased function after L2-L5 decompression and laminectomies    Interval Events (Subjective)  Patient sitting up in wheelchair. Observed patient ambulating today in AFOs for proper fit and function. Patient reports she is doing well. Per RN patient using oxycodone around the clock and demanding at night.  Will discuss with patient about frequency of medications. Discussed increase in Gabapentin. Will have IDT later today.     IDT Team Meeting 8/1/2018    Coni CARCAMO M.D., was present and led the interdisciplinary team conference on 8/1/2018.       RN:  Diet Regular   % Meal     Pain Oxycodone every 3 hours   Sleep    Bowel    Bladder    In's & Out's      PT:  Bed Mobility SBA   Transfers    Mobility SBA, 4WW 300 feet   Stairs maxA     OT:  Eating Lori   Grooming supervs   Bathing supervs   UB Dressing Lori   LB Dressing supervs   Toileting    Shower & Transfer    Difficulty with LSO and AFOs donning    SLP:  Not following    CM:  Continues to work on disposition and DME needs.     DC/Disposition:  8/10/18 <- decreased due to improvement. Will need medicaid approval    Objective:  VITAL SIGNS: BP (!) 99/63   Pulse 70   Temp 36.3 °C (97.3 °F)   Resp 18   Ht 1.651 m (5' 5\")   Wt 89.5 kg (197 lb 5 oz)   LMP 12/01/2013   SpO2 91%   BMI 32.83 kg/m²   Gen: NAD  Psych: Mood and affect appropriate  CV: RRR, no edema  Resp: CTAB, no upper airway sounds  Abd: NTND  Neuro: AOx4, walks with compensated trendelenberg gait to the right (left hip abductors weak)    No results found for this or any previous visit (from the past 72 hour(s)).    Current Facility-Administered Medications   Medication Frequency   • gabapentin (NEURONTIN) capsule 900 mg TID   • omeprazole (PRILOSEC) capsule 20 mg DAILY   • senna-docusate (PERICOLACE or SENOKOT S) 8.6-50 MG per tablet 2 Tab BID PRN    And   • polyethylene glycol/lytes (MIRALAX) PACKET 1 Packet QDAY PRN    " And   • magnesium hydroxide (MILK OF MAGNESIA) suspension 30 mL QDAY PRN    And   • bisacodyl (DULCOLAX) suppository 10 mg QDAY PRN   • senna-docusate (PERICOLACE or SENOKOT S) 8.6-50 MG per tablet 1 Tab BID   • DILTIAZem CD (CARDIZEM CD) capsule 240 mg DAILY   • Respiratory Care per Protocol Continuous RT   • Pharmacy Consult Request ...Pain Management Review 1 Each PRN   • oxyCODONE immediate release (ROXICODONE) tablet 10 mg Q3HRS PRN   • oxyCODONE immediate-release (ROXICODONE) tablet 15 mg Q3HRS PRN   • acetaminophen (TYLENOL) tablet 650 mg Q4HRS PRN   • artificial tears 1.4 % ophthalmic solution 1 Drop PRN   • benzocaine-menthol (CEPACOL) lozenge 1 Lozenge Q2HRS PRN   • hydrALAZINE (APRESOLINE) tablet 25 mg Q8HRS PRN   • mag hydrox-al hydrox-simeth (MAALOX PLUS ES or MYLANTA DS) suspension 20 mL Q2HRS PRN   • ondansetron (ZOFRAN ODT) dispertab 4 mg 4X/DAY PRN    Or   • ondansetron (ZOFRAN) syringe/vial injection 4 mg 4X/DAY PRN   • traZODone (DESYREL) tablet 50 mg QHS PRN   • sodium chloride (OCEAN) 0.65 % nasal spray 2 Spray PRN   • benzocaine-menthol (CEPACOL) lozenge 1 Lozenge Q2HRS PRN   • calcium carbonate (TUMS) chewable tab 500 mg BID   • docusate sodium (COLACE) capsule 100 mg BID   • tizanidine (ZANAFLEX) tablet 2 mg TID PRN   • enoxaparin (LOVENOX) inj 40 mg QHS   • hydroCHLOROthiazide (HYDRODIURIL) tablet 25 mg DAILY   • lisinopril (PRINIVIL) tablet 20 mg DAILY   • pregabalin (LYRICA) capsule 300 mg DAILY   • vitamin D (cholecalciferol) tablet 2,000 Units DAILY       Orders Placed This Encounter   Procedures   • Diet Order Regular     Standing Status:   Standing     Number of Occurrences:   1     Order Specific Question:   Diet:     Answer:   Regular [1]       Assessment:  Active Hospital Problems    Diagnosis   • *Lumbar stenosis with neurogenic claudication   • HTN (hypertension)   • High cholesterol   • Pain   • Bowel habit changes       Medical Decision Making and Plan:  Severe lumbar stenosis w  cauda equina - Patient with pre-operative bowel/bladder changes as well as decreased sensation and bilateral foot drop suggestive of cauda equina.  Patient is s/p L2-L5 decompression and posterior fusion with Dr. Perez on 7/18/18.  Since surgery patient has improved with bowel/bladder but continues to have weakness and decreased sensation.  -LSO when OOB with spinal precautions  -PT and OT to evaluate and treat mobility and ADLs.  Patient would benefit from custom articulated ankle-foot orthotics for in home mobility and ADLs.  Patient received custom AFOs, significant improvement with bilateral AFOs    -Follow-up with Dr. Perez' clinic 2 weeks and 6 weeks post-discharge     Neurogenic Bowel/Bladder - Patient with constipation and urinary incontinence prior to surgery. Appears to be improved but will need to monitor.    -Continue scheduled Senna-Docusate.  -Will need to check PVRs. Bladder scans 52 and 64 after voiding. Can discontinue as improving sensation and minimal residuals     Pain - Patient with both neuropathic and post-operative pain.  Discussed need to reduce opiate use.  Currently on Gabapentin 600 mg TID and Lyrica 300 mg daily.  Oxycodone and Tylenol PRN.    -Utilizing significant Oxycodone dosage, discussed decreased reliance. Increase Gabapentin to 900 mg TID. Will reduce Oxycodone to Q4H. Will discuss with patient about need to decrease below 120 MEQ daily    HTN/CV - Patient on HCTZ 25 mg daily, Lisinopril 20 mg daily, Diltiazem  mg daily. SBP remains controlled.      Vitamin D - On supplementation, admission labs within normal limits. Continue 2000 daily     GI Ppx - Patient on prilosec      DVT Ppx - Patient on Lovenox on transfer. Will monitor for ambulation. Patient ambulating 110 feet, goal of 125. Stop YENI hoses as discomfort and close to ambulation.      Total time:  35 minutes.  I spent greater than 50% of the time for patient care, counseling, and coordination on this date,  including unit/floor time, and face-to-face time with the patient as per interval events and assessment and plan above. Topics discussed included discharge planning and . Patient was discussed separately in IDT today; please see details above.      Coni Hope M.D.

## 2018-08-02 PROCEDURE — A9270 NON-COVERED ITEM OR SERVICE: HCPCS | Performed by: PHYSICAL MEDICINE & REHABILITATION

## 2018-08-02 PROCEDURE — 99232 SBSQ HOSP IP/OBS MODERATE 35: CPT | Performed by: PHYSICAL MEDICINE & REHABILITATION

## 2018-08-02 PROCEDURE — 770010 HCHG ROOM/CARE - REHAB SEMI PRIVAT*

## 2018-08-02 PROCEDURE — 700112 HCHG RX REV CODE 229: Performed by: PHYSICAL MEDICINE & REHABILITATION

## 2018-08-02 PROCEDURE — 97116 GAIT TRAINING THERAPY: CPT

## 2018-08-02 PROCEDURE — 97535 SELF CARE MNGMENT TRAINING: CPT

## 2018-08-02 PROCEDURE — 97110 THERAPEUTIC EXERCISES: CPT

## 2018-08-02 PROCEDURE — 700111 HCHG RX REV CODE 636 W/ 250 OVERRIDE (IP): Performed by: PHYSICAL MEDICINE & REHABILITATION

## 2018-08-02 PROCEDURE — 97112 NEUROMUSCULAR REEDUCATION: CPT

## 2018-08-02 PROCEDURE — 97530 THERAPEUTIC ACTIVITIES: CPT

## 2018-08-02 PROCEDURE — 700102 HCHG RX REV CODE 250 W/ 637 OVERRIDE(OP): Performed by: PHYSICAL MEDICINE & REHABILITATION

## 2018-08-02 RX ADMIN — OXYCODONE HYDROCHLORIDE 10 MG: 10 TABLET ORAL at 12:53

## 2018-08-02 RX ADMIN — CALCIUM CARBONATE (ANTACID) CHEW TAB 500 MG 500 MG: 500 CHEW TAB at 20:58

## 2018-08-02 RX ADMIN — LISINOPRIL 20 MG: 20 TABLET ORAL at 08:12

## 2018-08-02 RX ADMIN — SENNOSIDES AND DOCUSATE SODIUM 1 TABLET: 8.6; 5 TABLET ORAL at 20:58

## 2018-08-02 RX ADMIN — SENNOSIDES AND DOCUSATE SODIUM 1 TABLET: 8.6; 5 TABLET ORAL at 08:13

## 2018-08-02 RX ADMIN — DILTIAZEM HYDROCHLORIDE 240 MG: 120 CAPSULE, COATED, EXTENDED RELEASE ORAL at 08:12

## 2018-08-02 RX ADMIN — VITAMIN D, TAB 1000IU (100/BT) 2000 UNITS: 25 TAB at 08:13

## 2018-08-02 RX ADMIN — OXYCODONE HYDROCHLORIDE 10 MG: 10 TABLET ORAL at 23:06

## 2018-08-02 RX ADMIN — OXYCODONE HYDROCHLORIDE 10 MG: 10 TABLET ORAL at 02:34

## 2018-08-02 RX ADMIN — GABAPENTIN 900 MG: 300 CAPSULE ORAL at 20:58

## 2018-08-02 RX ADMIN — HYDROCHLOROTHIAZIDE 25 MG: 25 TABLET ORAL at 08:12

## 2018-08-02 RX ADMIN — OXYCODONE HYDROCHLORIDE 10 MG: 10 TABLET ORAL at 17:38

## 2018-08-02 RX ADMIN — TIZANIDINE 2 MG: 4 TABLET ORAL at 02:36

## 2018-08-02 RX ADMIN — GABAPENTIN 900 MG: 300 CAPSULE ORAL at 14:08

## 2018-08-02 RX ADMIN — ENOXAPARIN SODIUM 40 MG: 100 INJECTION SUBCUTANEOUS at 20:57

## 2018-08-02 RX ADMIN — CALCIUM CARBONATE (ANTACID) CHEW TAB 500 MG 500 MG: 500 CHEW TAB at 08:12

## 2018-08-02 RX ADMIN — TIZANIDINE 2 MG: 4 TABLET ORAL at 20:57

## 2018-08-02 RX ADMIN — OXYCODONE HYDROCHLORIDE 10 MG: 10 TABLET ORAL at 07:11

## 2018-08-02 RX ADMIN — GABAPENTIN 900 MG: 300 CAPSULE ORAL at 08:12

## 2018-08-02 RX ADMIN — OMEPRAZOLE 20 MG: 20 CAPSULE, DELAYED RELEASE ORAL at 08:12

## 2018-08-02 RX ADMIN — DOCUSATE SODIUM 100 MG: 100 CAPSULE, LIQUID FILLED ORAL at 08:12

## 2018-08-02 RX ADMIN — PREGABALIN 300 MG: 100 CAPSULE ORAL at 08:12

## 2018-08-02 RX ADMIN — DOCUSATE SODIUM 100 MG: 100 CAPSULE, LIQUID FILLED ORAL at 20:58

## 2018-08-02 ASSESSMENT — PAIN SCALES - GENERAL
PAINLEVEL_OUTOF10: 2
PAINLEVEL_OUTOF10: 7
PAINLEVEL_OUTOF10: 2
PAINLEVEL_OUTOF10: 2
PAINLEVEL_OUTOF10: 5
PAINLEVEL_OUTOF10: 6
PAINLEVEL_OUTOF10: 5
PAINLEVEL_OUTOF10: 7
PAINLEVEL_OUTOF10: 5

## 2018-08-02 NOTE — PROGRESS NOTES
"Rehab Progress Note     Encounter Date: 8/2/2018    CC: Decreased function after L2-L5 decompression and laminectomies    Interval Events (Subjective)  Seen in therapy gym walking with FWW. Discussed with patient about moving up discharge date. Discussed still dependent on medicaid approval which patient understands. She reports the opiate change is tolerated. She reports her pain is mostly at night when she is laying in bed.  She reports the increase in gabapentin may have helped her leg pain. Otherwise no N/V/D.      IDT Team Meeting 8/1/2018  DC/Disposition:  8/10/18 <- decreased due to improvement. Will need medicaid approval    Objective:  VITAL SIGNS: /60   Pulse 68   Temp 36.7 °C (98.1 °F)   Resp 18   Ht 1.651 m (5' 5\")   Wt 89.5 kg (197 lb 5 oz)   LMP 12/01/2013   SpO2 94%   BMI 32.83 kg/m²   Gen: NAD  Psych: Mood and affect appropriate  CV: RRR, no edema  Resp: CTAB, no upper airway sounds  Abd: NTND  Neuro: AOx4, walks with compensated trendelenberg gait to the right (left hip abductors weak)  Unchanged from 8/1/18     No results found for this or any previous visit (from the past 72 hour(s)).    Current Facility-Administered Medications   Medication Frequency   • oxyCODONE immediate release (ROXICODONE) tablet 10 mg Q4HRS PRN   • oxyCODONE immediate-release (ROXICODONE) tablet 15 mg Q4HRS PRN   • gabapentin (NEURONTIN) capsule 900 mg TID   • omeprazole (PRILOSEC) capsule 20 mg DAILY   • senna-docusate (PERICOLACE or SENOKOT S) 8.6-50 MG per tablet 2 Tab BID PRN    And   • polyethylene glycol/lytes (MIRALAX) PACKET 1 Packet QDAY PRN    And   • magnesium hydroxide (MILK OF MAGNESIA) suspension 30 mL QDAY PRN    And   • bisacodyl (DULCOLAX) suppository 10 mg QDAY PRN   • senna-docusate (PERICOLACE or SENOKOT S) 8.6-50 MG per tablet 1 Tab BID   • DILTIAZem CD (CARDIZEM CD) capsule 240 mg DAILY   • Respiratory Care per Protocol Continuous RT   • Pharmacy Consult Request ...Pain Management Review " 1 Each PRN   • acetaminophen (TYLENOL) tablet 650 mg Q4HRS PRN   • artificial tears 1.4 % ophthalmic solution 1 Drop PRN   • benzocaine-menthol (CEPACOL) lozenge 1 Lozenge Q2HRS PRN   • hydrALAZINE (APRESOLINE) tablet 25 mg Q8HRS PRN   • mag hydrox-al hydrox-simeth (MAALOX PLUS ES or MYLANTA DS) suspension 20 mL Q2HRS PRN   • ondansetron (ZOFRAN ODT) dispertab 4 mg 4X/DAY PRN    Or   • ondansetron (ZOFRAN) syringe/vial injection 4 mg 4X/DAY PRN   • traZODone (DESYREL) tablet 50 mg QHS PRN   • sodium chloride (OCEAN) 0.65 % nasal spray 2 Spray PRN   • calcium carbonate (TUMS) chewable tab 500 mg BID   • docusate sodium (COLACE) capsule 100 mg BID   • tizanidine (ZANAFLEX) tablet 2 mg TID PRN   • enoxaparin (LOVENOX) inj 40 mg QHS   • hydroCHLOROthiazide (HYDRODIURIL) tablet 25 mg DAILY   • lisinopril (PRINIVIL) tablet 20 mg DAILY   • pregabalin (LYRICA) capsule 300 mg DAILY   • vitamin D (cholecalciferol) tablet 2,000 Units DAILY       Orders Placed This Encounter   Procedures   • Diet Order Regular     Standing Status:   Standing     Number of Occurrences:   1     Order Specific Question:   Diet:     Answer:   Regular [1]       Assessment:  Active Hospital Problems    Diagnosis   • *Lumbar stenosis with neurogenic claudication   • HTN (hypertension)   • High cholesterol   • Pain   • Bowel habit changes       Medical Decision Making and Plan:  Severe lumbar stenosis w cauda equina - Patient with pre-operative bowel/bladder changes as well as decreased sensation and bilateral foot drop suggestive of cauda equina.  Patient is s/p L2-L5 decompression and posterior fusion with Dr. Perez on 7/18/18.  Since surgery patient has improved with bowel/bladder but continues to have weakness and decreased sensation.  -LSO when OOB with spinal precautions  -PT and OT to evaluate and treat mobility and ADLs.  Patient would benefit from custom articulated ankle-foot orthotics for in home mobility and ADLs.  Patient received custom  AFOs, significant improvement with bilateral AFOs    -Follow-up with Dr. Perez' clinic 2 weeks and 6 weeks post-discharge     Neurogenic Bowel/Bladder - Patient with constipation and urinary incontinence prior to surgery. Appears to be improved but will need to monitor.  Will need to check PVRs. Bladder scans 52 and 64 after voiding. Can discontinue as improving sensation and minimal residuals.  -Continue scheduled Senna-Docusate.     Pain - Patient with both neuropathic and post-operative pain.  Discussed need to reduce opiate use.  Currently on Gabapentin 600 mg TID and Lyrica 300 mg daily.  Oxycodone and Tylenol PRN.    -Utilizing significant Oxycodone dosage, discussed decreased reliance. Increase Gabapentin to 900 mg TID. Will reduce Oxycodone to Q4H. Discussed at length with patient about opiate management, she plans to return to Dr. Yoo after her hospitalization for her pain.  Patient agreeable to the Q4H oxycodone     HTN/CV - Patient on HCTZ 25 mg daily, Lisinopril 20 mg daily, Diltiazem  mg daily. SBP remains controlled.      Vitamin D - On supplementation, admission labs within normal limits. Continue 2000 daily     GI Ppx - Patient on prilosec      DVT Ppx - Patient on Lovenox on transfer. Will monitor for ambulation. Patient ambulating 110 feet, goal of 125. Stop YENI hoses as discomfort and close to ambulation.      Total time:  30 minutes.  I spent greater than 50% of the time for patient care, counseling, and coordination on this date, including unit/floor time, and face-to-face time with the patient as per interval events and assessment and plan above. Topics discussed included opiate discussion as well as long term plan with reduction to Q4H and possible reduction again in the next few days.       Coni Hope M.D.

## 2018-08-02 NOTE — CARE PLAN
Problem: Infection  Goal: Will remain free from infection  Pt remains free from s/sx of infection. Back incision with steri strips and open to air. No drainage noted.     Problem: NUTRITION  Goal: Adequate Food and Fluid Intake  Patient free from s/s dehydration. Oral/buccal mucosa pink and moist as are conjunctiva; skin turgor good.Taking PO fluids well.Will continue to monitor.

## 2018-08-03 PROCEDURE — A9270 NON-COVERED ITEM OR SERVICE: HCPCS | Performed by: PHYSICAL MEDICINE & REHABILITATION

## 2018-08-03 PROCEDURE — 97535 SELF CARE MNGMENT TRAINING: CPT

## 2018-08-03 PROCEDURE — 97112 NEUROMUSCULAR REEDUCATION: CPT

## 2018-08-03 PROCEDURE — 700112 HCHG RX REV CODE 229: Performed by: PHYSICAL MEDICINE & REHABILITATION

## 2018-08-03 PROCEDURE — 700102 HCHG RX REV CODE 250 W/ 637 OVERRIDE(OP): Performed by: PHYSICAL MEDICINE & REHABILITATION

## 2018-08-03 PROCEDURE — 99232 SBSQ HOSP IP/OBS MODERATE 35: CPT | Performed by: PHYSICAL MEDICINE & REHABILITATION

## 2018-08-03 PROCEDURE — 770010 HCHG ROOM/CARE - REHAB SEMI PRIVAT*

## 2018-08-03 PROCEDURE — 97530 THERAPEUTIC ACTIVITIES: CPT

## 2018-08-03 PROCEDURE — 97110 THERAPEUTIC EXERCISES: CPT

## 2018-08-03 PROCEDURE — 97116 GAIT TRAINING THERAPY: CPT

## 2018-08-03 RX ADMIN — DOCUSATE SODIUM 100 MG: 100 CAPSULE, LIQUID FILLED ORAL at 20:20

## 2018-08-03 RX ADMIN — PREGABALIN 300 MG: 100 CAPSULE ORAL at 09:04

## 2018-08-03 RX ADMIN — OXYCODONE HYDROCHLORIDE 10 MG: 10 TABLET ORAL at 21:14

## 2018-08-03 RX ADMIN — CALCIUM CARBONATE (ANTACID) CHEW TAB 500 MG 500 MG: 500 CHEW TAB at 20:20

## 2018-08-03 RX ADMIN — OXYCODONE HYDROCHLORIDE 10 MG: 10 TABLET ORAL at 16:46

## 2018-08-03 RX ADMIN — SENNOSIDES AND DOCUSATE SODIUM 1 TABLET: 8.6; 5 TABLET ORAL at 09:05

## 2018-08-03 RX ADMIN — GABAPENTIN 900 MG: 300 CAPSULE ORAL at 09:04

## 2018-08-03 RX ADMIN — OXYCODONE HYDROCHLORIDE 10 MG: 10 TABLET ORAL at 12:42

## 2018-08-03 RX ADMIN — SENNOSIDES AND DOCUSATE SODIUM 1 TABLET: 8.6; 5 TABLET ORAL at 20:20

## 2018-08-03 RX ADMIN — OXYCODONE HYDROCHLORIDE 10 MG: 10 TABLET ORAL at 03:10

## 2018-08-03 RX ADMIN — DOCUSATE SODIUM 100 MG: 100 CAPSULE, LIQUID FILLED ORAL at 09:04

## 2018-08-03 RX ADMIN — GABAPENTIN 900 MG: 300 CAPSULE ORAL at 20:20

## 2018-08-03 RX ADMIN — HYDROCHLOROTHIAZIDE 25 MG: 25 TABLET ORAL at 09:05

## 2018-08-03 RX ADMIN — CALCIUM CARBONATE (ANTACID) CHEW TAB 500 MG 500 MG: 500 CHEW TAB at 09:05

## 2018-08-03 RX ADMIN — OMEPRAZOLE 20 MG: 20 CAPSULE, DELAYED RELEASE ORAL at 09:05

## 2018-08-03 RX ADMIN — OXYCODONE HYDROCHLORIDE 10 MG: 10 TABLET ORAL at 07:57

## 2018-08-03 RX ADMIN — TIZANIDINE 2 MG: 4 TABLET ORAL at 20:21

## 2018-08-03 RX ADMIN — LISINOPRIL 20 MG: 20 TABLET ORAL at 09:04

## 2018-08-03 RX ADMIN — DILTIAZEM HYDROCHLORIDE 240 MG: 120 CAPSULE, COATED, EXTENDED RELEASE ORAL at 09:05

## 2018-08-03 RX ADMIN — VITAMIN D, TAB 1000IU (100/BT) 2000 UNITS: 25 TAB at 09:04

## 2018-08-03 RX ADMIN — GABAPENTIN 900 MG: 300 CAPSULE ORAL at 14:31

## 2018-08-03 ASSESSMENT — PAIN SCALES - GENERAL
PAINLEVEL_OUTOF10: 6
PAINLEVEL_OUTOF10: 5
PAINLEVEL_OUTOF10: 6
PAINLEVEL_OUTOF10: 6
PAINLEVEL_OUTOF10: 5
PAINLEVEL_OUTOF10: 6

## 2018-08-03 NOTE — PROGRESS NOTES
"Rehab Progress Note     Encounter Date: 8/3/2018    CC: Decreased function after L2-L5 decompression and laminectomies    Interval Events (Subjective)  Patient sitting up in wheelchair. She reports therapy is going well. Detailed discussion with patient about opiates and titration. Patient is not ready to titrate any further as she has significant pain when laying down at night. She also would like to make sure she is woken up at night. Discussed that if she is sleeping the pain must be controlled. She reports she does not want to get \"behind the pain.\"  Discussed with nursing that if she asks for pain medications but falls asleep before they get to her they should still give them. Otherwise denies N/V/D or constipation.     IDT Team Meeting 8/1/2018  DC/Disposition:  8/10/18 <- decreased due to improvement. Will need medicaid approval    Objective:  VITAL SIGNS: /74   Pulse 85   Temp 36.7 °C (98.1 °F)   Resp 18   Ht 1.651 m (5' 5\")   Wt 89.5 kg (197 lb 5 oz)   LMP 12/01/2013   SpO2 94%   BMI 32.83 kg/m²   Gen: NAD  Psych: Mood and affect appropriate  CV: RRR, no edema  Resp: CTAB, no upper airway sounds  Abd: NTND  Neuro: AOx4, 5/5 BUE, working on exercises in wheelchair     No results found for this or any previous visit (from the past 72 hour(s)).    Current Facility-Administered Medications   Medication Frequency   • oxyCODONE immediate release (ROXICODONE) tablet 10 mg Q4HRS PRN   • oxyCODONE immediate-release (ROXICODONE) tablet 15 mg Q4HRS PRN   • gabapentin (NEURONTIN) capsule 900 mg TID   • omeprazole (PRILOSEC) capsule 20 mg DAILY   • senna-docusate (PERICOLACE or SENOKOT S) 8.6-50 MG per tablet 2 Tab BID PRN    And   • polyethylene glycol/lytes (MIRALAX) PACKET 1 Packet QDAY PRN    And   • magnesium hydroxide (MILK OF MAGNESIA) suspension 30 mL QDAY PRN    And   • bisacodyl (DULCOLAX) suppository 10 mg QDAY PRN   • senna-docusate (PERICOLACE or SENOKOT S) 8.6-50 MG per tablet 1 Tab BID   • " DILTIAZem CD (CARDIZEM CD) capsule 240 mg DAILY   • Respiratory Care per Protocol Continuous RT   • Pharmacy Consult Request ...Pain Management Review 1 Each PRN   • acetaminophen (TYLENOL) tablet 650 mg Q4HRS PRN   • artificial tears 1.4 % ophthalmic solution 1 Drop PRN   • benzocaine-menthol (CEPACOL) lozenge 1 Lozenge Q2HRS PRN   • hydrALAZINE (APRESOLINE) tablet 25 mg Q8HRS PRN   • mag hydrox-al hydrox-simeth (MAALOX PLUS ES or MYLANTA DS) suspension 20 mL Q2HRS PRN   • ondansetron (ZOFRAN ODT) dispertab 4 mg 4X/DAY PRN    Or   • ondansetron (ZOFRAN) syringe/vial injection 4 mg 4X/DAY PRN   • traZODone (DESYREL) tablet 50 mg QHS PRN   • sodium chloride (OCEAN) 0.65 % nasal spray 2 Spray PRN   • calcium carbonate (TUMS) chewable tab 500 mg BID   • docusate sodium (COLACE) capsule 100 mg BID   • tizanidine (ZANAFLEX) tablet 2 mg TID PRN   • enoxaparin (LOVENOX) inj 40 mg QHS   • hydroCHLOROthiazide (HYDRODIURIL) tablet 25 mg DAILY   • lisinopril (PRINIVIL) tablet 20 mg DAILY   • pregabalin (LYRICA) capsule 300 mg DAILY   • vitamin D (cholecalciferol) tablet 2,000 Units DAILY       Orders Placed This Encounter   Procedures   • Diet Order Regular     Standing Status:   Standing     Number of Occurrences:   1     Order Specific Question:   Diet:     Answer:   Regular [1]       Assessment:  Active Hospital Problems    Diagnosis   • *Lumbar stenosis with neurogenic claudication   • HTN (hypertension)   • High cholesterol   • Pain   • Bowel habit changes       Medical Decision Making and Plan:  Severe lumbar stenosis w cauda equina - Patient with pre-operative bowel/bladder changes as well as decreased sensation and bilateral foot drop suggestive of cauda equina.  Patient is s/p L2-L5 decompression and posterior fusion with Dr. Perez on 7/18/18.  Since surgery patient has improved with bowel/bladder but continues to have weakness and decreased sensation.  -LSO when OOB with spinal precautions  -PT and OT to evaluate  and treat mobility and ADLs.  Patient would benefit from custom articulated ankle-foot orthotics for in home mobility and ADLs.  Patient received custom AFOs, significant improvement with bilateral AFOs    -Follow-up with Dr. Perez' clinic 2 weeks and 6 weeks post-discharge     Neurogenic Bowel/Bladder - Patient with constipation and urinary incontinence prior to surgery. Appears to be improved but will need to monitor.  Will need to check PVRs. Bladder scans 52 and 64 after voiding. Can discontinue as improving sensation and minimal residuals.  -Continue scheduled Senna-Docusate.     Pain - Patient with both neuropathic and post-operative pain.  Discussed need to reduce opiate use.  Currently on Gabapentin 600 mg TID and Lyrica 300 mg daily.  Oxycodone and Tylenol PRN.    -Utilizing significant Oxycodone dosage, discussed decreased reliance. Increase Gabapentin to 900 mg TID. Will reduce Oxycodone to Q4H. Again long discussion about opiate wean.  Currently no changes and RN to wake patient if she asked for medications.  Discussed maybe moving Lyrica to night time    HTN/CV - Patient on HCTZ 25 mg daily, Lisinopril 20 mg daily, Diltiazem  mg daily. SBP remains controlled.      Vitamin D - On supplementation, admission labs within normal limits. Continue 2000 daily     GI Ppx - Patient on prilosec      DVT Ppx - Patient ambulating + 300 feet on 8/3/18. Will stop Lovenox     Total time:  30 minutes.  I spent greater than 50% of the time for patient care, counseling, and coordination on this date, including unit/floor time, and face-to-face time with the patient as per interval events and assessment and plan above. Topics discussed included opiates, Lyrica and discontinue Lovenox.       Coni Hope M.D.

## 2018-08-03 NOTE — CARE PLAN
Problem: Safety  Goal: Will remain free from injury  Outcome: PROGRESSING AS EXPECTED  Patient educated on the importance of using call light for assistance, patient verbalized understanding. Patient uses call light appropriately, does not attempt to self transfer. Call light and belongings within reach, bed in lowest and locked position, bed rails up x2, and non skid socks on. Hourly rounding in place.    Problem: Pain Management  Goal: Pain level will decrease to patient's comfort goal  Patient complaining of back spasms/pain this shift. Patient given PRN zanaflex and delfino 10. Patient now asleep and appears comfortable. Will continue to assess and monitor pain.

## 2018-08-03 NOTE — PROGRESS NOTES
Received shift report and assumed care of patient. Patient awake and resting in wheelchair. All needs met at this time. Call light and belongings within reach.

## 2018-08-04 PROCEDURE — 770010 HCHG ROOM/CARE - REHAB SEMI PRIVAT*

## 2018-08-04 PROCEDURE — 700112 HCHG RX REV CODE 229: Performed by: PHYSICAL MEDICINE & REHABILITATION

## 2018-08-04 PROCEDURE — A9270 NON-COVERED ITEM OR SERVICE: HCPCS | Performed by: PHYSICAL MEDICINE & REHABILITATION

## 2018-08-04 PROCEDURE — 700102 HCHG RX REV CODE 250 W/ 637 OVERRIDE(OP): Performed by: PHYSICAL MEDICINE & REHABILITATION

## 2018-08-04 RX ADMIN — OMEPRAZOLE 20 MG: 20 CAPSULE, DELAYED RELEASE ORAL at 09:16

## 2018-08-04 RX ADMIN — SENNOSIDES AND DOCUSATE SODIUM 1 TABLET: 8.6; 5 TABLET ORAL at 09:17

## 2018-08-04 RX ADMIN — GABAPENTIN 900 MG: 300 CAPSULE ORAL at 20:57

## 2018-08-04 RX ADMIN — OXYCODONE HYDROCHLORIDE 10 MG: 10 TABLET ORAL at 19:09

## 2018-08-04 RX ADMIN — OXYCODONE HYDROCHLORIDE 10 MG: 10 TABLET ORAL at 14:47

## 2018-08-04 RX ADMIN — DOCUSATE SODIUM 100 MG: 100 CAPSULE, LIQUID FILLED ORAL at 09:18

## 2018-08-04 RX ADMIN — OXYCODONE HYDROCHLORIDE 10 MG: 10 TABLET ORAL at 01:17

## 2018-08-04 RX ADMIN — DOCUSATE SODIUM 100 MG: 100 CAPSULE, LIQUID FILLED ORAL at 20:57

## 2018-08-04 RX ADMIN — OXYCODONE HYDROCHLORIDE 10 MG: 10 TABLET ORAL at 09:18

## 2018-08-04 RX ADMIN — TIZANIDINE 2 MG: 4 TABLET ORAL at 20:57

## 2018-08-04 RX ADMIN — HYDROCHLOROTHIAZIDE 25 MG: 25 TABLET ORAL at 09:18

## 2018-08-04 RX ADMIN — CALCIUM CARBONATE (ANTACID) CHEW TAB 500 MG 500 MG: 500 CHEW TAB at 09:16

## 2018-08-04 RX ADMIN — CALCIUM CARBONATE (ANTACID) CHEW TAB 500 MG 500 MG: 500 CHEW TAB at 20:57

## 2018-08-04 RX ADMIN — PREGABALIN 300 MG: 100 CAPSULE ORAL at 09:17

## 2018-08-04 RX ADMIN — GABAPENTIN 900 MG: 300 CAPSULE ORAL at 09:17

## 2018-08-04 RX ADMIN — DILTIAZEM HYDROCHLORIDE 240 MG: 120 CAPSULE, COATED, EXTENDED RELEASE ORAL at 09:17

## 2018-08-04 RX ADMIN — SENNOSIDES AND DOCUSATE SODIUM 1 TABLET: 8.6; 5 TABLET ORAL at 20:57

## 2018-08-04 RX ADMIN — GABAPENTIN 900 MG: 300 CAPSULE ORAL at 14:47

## 2018-08-04 RX ADMIN — OXYCODONE HYDROCHLORIDE 10 MG: 10 TABLET ORAL at 05:18

## 2018-08-04 RX ADMIN — LISINOPRIL 20 MG: 20 TABLET ORAL at 09:18

## 2018-08-04 RX ADMIN — VITAMIN D, TAB 1000IU (100/BT) 2000 UNITS: 25 TAB at 09:16

## 2018-08-04 ASSESSMENT — PAIN SCALES - GENERAL
PAINLEVEL_OUTOF10: 5
PAINLEVEL_OUTOF10: 6
PAINLEVEL_OUTOF10: 5
PAINLEVEL_OUTOF10: 6
PAINLEVEL_OUTOF10: 5

## 2018-08-04 NOTE — CARE PLAN
Problem: Bowel/Gastric:  Goal: Normal bowel function is maintained or improved  Outcome: PROGRESSING AS EXPECTED  Patient continent of bowel and uses toilet for elimination. Last BM 8/3/18, denies s/s of constipation. Patient taking stool softeners as scheduled. Bowel sounds normoactive in all 4 quadrants, abdomen soft and non-tender, no distention noted.     Problem: Pain Management  Goal: Pain level will decrease to patient's comfort goal  Patient taking PRN roxicodone 10 mg Q 4 hours. Pain remains between 5 & 6/10. Patient reports pain worsens when in bed r/t positioning. Will continue to assess and monitor pain.

## 2018-08-04 NOTE — PROGRESS NOTES
Received shift report and assumed care of patient. Patient awake and resting in wheelchair, assisted to bathroom. All needs met at this time. Call light and belongings within reach.

## 2018-08-05 PROCEDURE — A9270 NON-COVERED ITEM OR SERVICE: HCPCS | Performed by: PHYSICAL MEDICINE & REHABILITATION

## 2018-08-05 PROCEDURE — 700102 HCHG RX REV CODE 250 W/ 637 OVERRIDE(OP): Performed by: PHYSICAL MEDICINE & REHABILITATION

## 2018-08-05 PROCEDURE — 700112 HCHG RX REV CODE 229: Performed by: PHYSICAL MEDICINE & REHABILITATION

## 2018-08-05 PROCEDURE — 770010 HCHG ROOM/CARE - REHAB SEMI PRIVAT*

## 2018-08-05 RX ADMIN — OXYCODONE HYDROCHLORIDE 10 MG: 10 TABLET ORAL at 00:18

## 2018-08-05 RX ADMIN — OXYCODONE HYDROCHLORIDE 10 MG: 10 TABLET ORAL at 05:04

## 2018-08-05 RX ADMIN — GABAPENTIN 900 MG: 300 CAPSULE ORAL at 15:07

## 2018-08-05 RX ADMIN — TIZANIDINE 2 MG: 4 TABLET ORAL at 22:28

## 2018-08-05 RX ADMIN — DILTIAZEM HYDROCHLORIDE 240 MG: 120 CAPSULE, COATED, EXTENDED RELEASE ORAL at 09:30

## 2018-08-05 RX ADMIN — CALCIUM CARBONATE (ANTACID) CHEW TAB 500 MG 500 MG: 500 CHEW TAB at 19:41

## 2018-08-05 RX ADMIN — DOCUSATE SODIUM 100 MG: 100 CAPSULE, LIQUID FILLED ORAL at 09:28

## 2018-08-05 RX ADMIN — SENNOSIDES AND DOCUSATE SODIUM 1 TABLET: 8.6; 5 TABLET ORAL at 19:42

## 2018-08-05 RX ADMIN — OXYCODONE HYDROCHLORIDE 10 MG: 10 TABLET ORAL at 19:43

## 2018-08-05 RX ADMIN — OXYCODONE HYDROCHLORIDE 10 MG: 10 TABLET ORAL at 09:29

## 2018-08-05 RX ADMIN — SENNOSIDES AND DOCUSATE SODIUM 1 TABLET: 8.6; 5 TABLET ORAL at 09:30

## 2018-08-05 RX ADMIN — GABAPENTIN 900 MG: 300 CAPSULE ORAL at 19:42

## 2018-08-05 RX ADMIN — CALCIUM CARBONATE (ANTACID) CHEW TAB 500 MG 500 MG: 500 CHEW TAB at 09:29

## 2018-08-05 RX ADMIN — PREGABALIN 300 MG: 100 CAPSULE ORAL at 09:28

## 2018-08-05 RX ADMIN — VITAMIN D, TAB 1000IU (100/BT) 2000 UNITS: 25 TAB at 09:30

## 2018-08-05 RX ADMIN — DOCUSATE SODIUM 100 MG: 100 CAPSULE, LIQUID FILLED ORAL at 19:42

## 2018-08-05 RX ADMIN — OMEPRAZOLE 20 MG: 20 CAPSULE, DELAYED RELEASE ORAL at 09:30

## 2018-08-05 RX ADMIN — LISINOPRIL 20 MG: 20 TABLET ORAL at 09:29

## 2018-08-05 RX ADMIN — OXYCODONE HYDROCHLORIDE 10 MG: 10 TABLET ORAL at 13:35

## 2018-08-05 RX ADMIN — HYDROCHLOROTHIAZIDE 25 MG: 25 TABLET ORAL at 09:29

## 2018-08-05 RX ADMIN — GABAPENTIN 900 MG: 300 CAPSULE ORAL at 09:28

## 2018-08-05 ASSESSMENT — PAIN SCALES - GENERAL
PAINLEVEL_OUTOF10: 5
PAINLEVEL_OUTOF10: 6
PAINLEVEL_OUTOF10: 2
PAINLEVEL_OUTOF10: 6

## 2018-08-05 NOTE — CARE PLAN
Problem: Pain Management  Goal: Pain level will decrease to patient's comfort goal  Patient complains of back pain. Patient given PRN delfino 10 mg x2 so far this shift. Will continue to assess and monitor.     Problem: Skin Integrity  Goal: Risk for impaired skin integrity will decrease  Back incision is approximated and open to air, steri strips are falling off. Scabbing and dry/falky skin at incision.

## 2018-08-06 PROCEDURE — 97535 SELF CARE MNGMENT TRAINING: CPT

## 2018-08-06 PROCEDURE — 97112 NEUROMUSCULAR REEDUCATION: CPT

## 2018-08-06 PROCEDURE — A9270 NON-COVERED ITEM OR SERVICE: HCPCS | Performed by: PHYSICAL MEDICINE & REHABILITATION

## 2018-08-06 PROCEDURE — 97110 THERAPEUTIC EXERCISES: CPT

## 2018-08-06 PROCEDURE — 99232 SBSQ HOSP IP/OBS MODERATE 35: CPT | Performed by: PHYSICAL MEDICINE & REHABILITATION

## 2018-08-06 PROCEDURE — 700102 HCHG RX REV CODE 250 W/ 637 OVERRIDE(OP): Performed by: PHYSICAL MEDICINE & REHABILITATION

## 2018-08-06 PROCEDURE — 97530 THERAPEUTIC ACTIVITIES: CPT

## 2018-08-06 PROCEDURE — 97116 GAIT TRAINING THERAPY: CPT

## 2018-08-06 PROCEDURE — 770010 HCHG ROOM/CARE - REHAB SEMI PRIVAT*

## 2018-08-06 PROCEDURE — 700112 HCHG RX REV CODE 229: Performed by: PHYSICAL MEDICINE & REHABILITATION

## 2018-08-06 RX ADMIN — OXYCODONE HYDROCHLORIDE 10 MG: 10 TABLET ORAL at 06:24

## 2018-08-06 RX ADMIN — TIZANIDINE 2 MG: 4 TABLET ORAL at 23:46

## 2018-08-06 RX ADMIN — OXYCODONE HYDROCHLORIDE 10 MG: 10 TABLET ORAL at 02:20

## 2018-08-06 RX ADMIN — OXYCODONE HYDROCHLORIDE 10 MG: 10 TABLET ORAL at 10:50

## 2018-08-06 RX ADMIN — GABAPENTIN 900 MG: 300 CAPSULE ORAL at 09:31

## 2018-08-06 RX ADMIN — DILTIAZEM HYDROCHLORIDE 240 MG: 120 CAPSULE, COATED, EXTENDED RELEASE ORAL at 09:31

## 2018-08-06 RX ADMIN — LISINOPRIL 20 MG: 20 TABLET ORAL at 09:30

## 2018-08-06 RX ADMIN — PREGABALIN 300 MG: 100 CAPSULE ORAL at 09:31

## 2018-08-06 RX ADMIN — DOCUSATE SODIUM 100 MG: 100 CAPSULE, LIQUID FILLED ORAL at 09:30

## 2018-08-06 RX ADMIN — CALCIUM CARBONATE (ANTACID) CHEW TAB 500 MG 500 MG: 500 CHEW TAB at 09:31

## 2018-08-06 RX ADMIN — VITAMIN D, TAB 1000IU (100/BT) 2000 UNITS: 25 TAB at 09:30

## 2018-08-06 RX ADMIN — SENNOSIDES AND DOCUSATE SODIUM 1 TABLET: 8.6; 5 TABLET ORAL at 19:34

## 2018-08-06 RX ADMIN — OXYCODONE HYDROCHLORIDE 10 MG: 10 TABLET ORAL at 19:34

## 2018-08-06 RX ADMIN — OXYCODONE HYDROCHLORIDE 10 MG: 10 TABLET ORAL at 23:43

## 2018-08-06 RX ADMIN — TIZANIDINE 2 MG: 4 TABLET ORAL at 10:51

## 2018-08-06 RX ADMIN — GABAPENTIN 900 MG: 300 CAPSULE ORAL at 15:25

## 2018-08-06 RX ADMIN — CALCIUM CARBONATE (ANTACID) CHEW TAB 500 MG 500 MG: 500 CHEW TAB at 19:34

## 2018-08-06 RX ADMIN — HYDROCHLOROTHIAZIDE 25 MG: 25 TABLET ORAL at 09:30

## 2018-08-06 RX ADMIN — OXYCODONE HYDROCHLORIDE 10 MG: 10 TABLET ORAL at 15:23

## 2018-08-06 RX ADMIN — GABAPENTIN 900 MG: 300 CAPSULE ORAL at 19:35

## 2018-08-06 RX ADMIN — OMEPRAZOLE 20 MG: 20 CAPSULE, DELAYED RELEASE ORAL at 09:30

## 2018-08-06 RX ADMIN — SENNOSIDES AND DOCUSATE SODIUM 1 TABLET: 8.6; 5 TABLET ORAL at 09:32

## 2018-08-06 RX ADMIN — DOCUSATE SODIUM 100 MG: 100 CAPSULE, LIQUID FILLED ORAL at 19:35

## 2018-08-06 ASSESSMENT — PAIN SCALES - GENERAL
PAINLEVEL_OUTOF10: 7
PAINLEVEL_OUTOF10: 5
PAINLEVEL_OUTOF10: 6
PAINLEVEL_OUTOF10: 7
PAINLEVEL_OUTOF10: 3
PAINLEVEL_OUTOF10: 8
PAINLEVEL_OUTOF10: 8

## 2018-08-06 NOTE — PROGRESS NOTES
Received shift report and assumed care of patient.  Patient awake, calm and stable, currently positioned in bed for comfort and safety; call light within reach. Patient complaining of 5/10 pain; will medicate per MAR. Will continue to monitor.

## 2018-08-07 PROCEDURE — A9270 NON-COVERED ITEM OR SERVICE: HCPCS

## 2018-08-07 PROCEDURE — 700102 HCHG RX REV CODE 250 W/ 637 OVERRIDE(OP)

## 2018-08-07 PROCEDURE — A9270 NON-COVERED ITEM OR SERVICE: HCPCS | Performed by: PHYSICAL MEDICINE & REHABILITATION

## 2018-08-07 PROCEDURE — 97535 SELF CARE MNGMENT TRAINING: CPT

## 2018-08-07 PROCEDURE — 99232 SBSQ HOSP IP/OBS MODERATE 35: CPT | Performed by: PHYSICAL MEDICINE & REHABILITATION

## 2018-08-07 PROCEDURE — 700102 HCHG RX REV CODE 250 W/ 637 OVERRIDE(OP): Performed by: PHYSICAL MEDICINE & REHABILITATION

## 2018-08-07 PROCEDURE — 97110 THERAPEUTIC EXERCISES: CPT

## 2018-08-07 PROCEDURE — 770010 HCHG ROOM/CARE - REHAB SEMI PRIVAT*

## 2018-08-07 PROCEDURE — 700112 HCHG RX REV CODE 229: Performed by: PHYSICAL MEDICINE & REHABILITATION

## 2018-08-07 PROCEDURE — 97530 THERAPEUTIC ACTIVITIES: CPT

## 2018-08-07 PROCEDURE — 97116 GAIT TRAINING THERAPY: CPT

## 2018-08-07 RX ORDER — GABAPENTIN 300 MG/1
CAPSULE ORAL
Status: COMPLETED
Start: 2018-08-07 | End: 2018-08-07

## 2018-08-07 RX ORDER — HYDROCHLOROTHIAZIDE 25 MG/1
TABLET ORAL
Status: COMPLETED
Start: 2018-08-07 | End: 2018-08-07

## 2018-08-07 RX ORDER — OXYCODONE HYDROCHLORIDE 10 MG/1
TABLET ORAL
Status: COMPLETED
Start: 2018-08-07 | End: 2018-08-07

## 2018-08-07 RX ORDER — LISINOPRIL 20 MG/1
TABLET ORAL
Status: COMPLETED
Start: 2018-08-07 | End: 2018-08-07

## 2018-08-07 RX ORDER — DOCUSATE SODIUM 100 MG/1
200 CAPSULE, LIQUID FILLED ORAL 2 TIMES DAILY
Status: DISCONTINUED | OUTPATIENT
Start: 2018-08-07 | End: 2018-08-10 | Stop reason: HOSPADM

## 2018-08-07 RX ORDER — DOCUSATE SODIUM 100 MG/1
CAPSULE, LIQUID FILLED ORAL
Status: ACTIVE
Start: 2018-08-07 | End: 2018-08-07

## 2018-08-07 RX ORDER — AMOXICILLIN 250 MG
CAPSULE ORAL
Status: ACTIVE
Start: 2018-08-07 | End: 2018-08-07

## 2018-08-07 RX ORDER — CALCIUM CARBONATE 500 MG/1
TABLET, CHEWABLE ORAL
Status: COMPLETED
Start: 2018-08-07 | End: 2018-08-07

## 2018-08-07 RX ORDER — OMEPRAZOLE 20 MG/1
CAPSULE, DELAYED RELEASE ORAL
Status: COMPLETED
Start: 2018-08-07 | End: 2018-08-07

## 2018-08-07 RX ORDER — PREGABALIN 100 MG/1
CAPSULE ORAL
Status: COMPLETED
Start: 2018-08-07 | End: 2018-08-07

## 2018-08-07 RX ORDER — DILTIAZEM HYDROCHLORIDE 120 MG/1
CAPSULE, COATED, EXTENDED RELEASE ORAL
Status: COMPLETED
Start: 2018-08-07 | End: 2018-08-07

## 2018-08-07 RX ADMIN — GABAPENTIN 900 MG: 300 CAPSULE ORAL at 20:02

## 2018-08-07 RX ADMIN — OXYCODONE HYDROCHLORIDE 10 MG: 10 TABLET ORAL at 04:58

## 2018-08-07 RX ADMIN — VITAMIN D, TAB 1000IU (100/BT) 2000 UNITS: 25 TAB at 08:30

## 2018-08-07 RX ADMIN — GABAPENTIN 900 MG: 300 CAPSULE ORAL at 15:01

## 2018-08-07 RX ADMIN — DILTIAZEM HYDROCHLORIDE 240 MG: 120 CAPSULE, EXTENDED RELEASE ORAL at 08:30

## 2018-08-07 RX ADMIN — OXYCODONE HYDROCHLORIDE 10 MG: 10 TABLET ORAL at 15:01

## 2018-08-07 RX ADMIN — OMEPRAZOLE 20 MG: 20 CAPSULE, DELAYED RELEASE ORAL at 08:30

## 2018-08-07 RX ADMIN — CALCIUM CARBONATE (ANTACID) CHEW TAB 500 MG 500 MG: 500 CHEW TAB at 20:01

## 2018-08-07 RX ADMIN — OXYCODONE HYDROCHLORIDE 10 MG: 10 TABLET ORAL at 23:09

## 2018-08-07 RX ADMIN — OXYCODONE HYDROCHLORIDE 10 MG: 5 TABLET ORAL at 09:30

## 2018-08-07 RX ADMIN — CALCIUM CARBONATE (ANTACID) CHEW TAB 500 MG 500 MG: 500 CHEW TAB at 08:30

## 2018-08-07 RX ADMIN — HYDROCHLOROTHIAZIDE 25 MG: 25 TABLET ORAL at 08:30

## 2018-08-07 RX ADMIN — LISINOPRIL 20 MG: 20 TABLET ORAL at 08:30

## 2018-08-07 RX ADMIN — DILTIAZEM HYDROCHLORIDE 240 MG: 120 CAPSULE, COATED, EXTENDED RELEASE ORAL at 08:30

## 2018-08-07 RX ADMIN — SENNOSIDES AND DOCUSATE SODIUM 1 TABLET: 8.6; 5 TABLET ORAL at 20:02

## 2018-08-07 RX ADMIN — PREGABALIN 300 MG: 100 CAPSULE ORAL at 08:30

## 2018-08-07 RX ADMIN — DOCUSATE SODIUM 200 MG: 100 CAPSULE, LIQUID FILLED ORAL at 20:01

## 2018-08-07 RX ADMIN — GABAPENTIN 900 MG: 300 CAPSULE ORAL at 08:30

## 2018-08-07 RX ADMIN — TIZANIDINE 2 MG: 4 TABLET ORAL at 23:12

## 2018-08-07 RX ADMIN — OXYCODONE HYDROCHLORIDE 10 MG: 10 TABLET ORAL at 09:30

## 2018-08-07 RX ADMIN — OXYCODONE HYDROCHLORIDE 10 MG: 10 TABLET ORAL at 18:58

## 2018-08-07 ASSESSMENT — ENCOUNTER SYMPTOMS
DIZZINESS: 0
DIARRHEA: 0
FALLS: 0
HEARTBURN: 0
PALPITATIONS: 0
DEPRESSION: 0
NAUSEA: 0
SEIZURES: 0
TREMORS: 0
NERVOUS/ANXIOUS: 0
SPEECH CHANGE: 0
FOCAL WEAKNESS: 0
FEVER: 0
HEADACHES: 0
BACK PAIN: 1
NECK PAIN: 0
SHORTNESS OF BREATH: 0
WHEEZING: 0
EYES NEGATIVE: 1
CONSTIPATION: 1
ORTHOPNEA: 0
SPUTUM PRODUCTION: 0
WEIGHT LOSS: 0
BLOOD IN STOOL: 0
ABDOMINAL PAIN: 0
DIAPHORESIS: 0
CHILLS: 0
VOMITING: 0
COUGH: 0
TINGLING: 1
SENSORY CHANGE: 0
MYALGIAS: 0

## 2018-08-07 ASSESSMENT — PAIN SCALES - GENERAL
PAINLEVEL_OUTOF10: 3
PAINLEVEL_OUTOF10: ASSUMED PAIN PRESENT
PAINLEVEL_OUTOF10: 6
PAINLEVEL_OUTOF10: 6
PAINLEVEL_OUTOF10: 5

## 2018-08-07 NOTE — CARE PLAN
Problem: Bowel/Gastric:  Goal: Will not experience complications related to bowel motility    Intervention: Assess baseline bowel pattern  Patient refused scheduled bowel medications this am, patient educated to drink plenty of fluids to promote regular bowel movements and proper hydration status. Patient educated regarding narcotic use and constipation.      Problem: Mobility  Goal: Risk for activity intolerance will decrease    Intervention: Encourage patient to increase activity level in collaboration with Interdisciplinary Team  Patient made Mod I in room with FWW for transfers and ambulation.

## 2018-08-07 NOTE — PROGRESS NOTES
Patient is AOx4 has good safety awareness and was made Mod I with FWW in her room for transfers and ambulation. Patient has pain that is controlled at intervals with scheduled and prn pain management. Patient has been up participating in therapies.

## 2018-08-07 NOTE — NON-PROVIDER
[facility] Student Admitting History and Physical  Note Author: Tyrell Centeno, Student    Name Sari Ceja     1965   Age/Sex 53 y.o. female   MRN [id number]   Code Status Full     Attending Dr. Hope       Chief Complaint:  Nerve Pain     HPI:  Ms. Ceja is a 53 year old female who had been previously given multiple pain injections for lower back pain, from Dr. Yoo without relief.  She returned to Dr. Yoo for an epidural pain injection to relieve her nerve pain.  The patient reports there was significant pain following this injection.  She decided to get a second opinion by Dr. Perez.  MRI dated 2018 showed significant buldging of the lumbar discs with endplate spurring.  On 2018 Ms. Ceja underwent lumbar diskectomy with fusion by Dr. Perez.  Patient is positive for numbness, and tingling in bilateral lower extremities.  Which has improved since surgery.  Patient denies chest pain, shortness of breath, nausea or vomiting, diarrhea.  Is positive for mild constipation. Reports she did have a small bowel movement last night.  Ms. Ceja does report and aching pain in her lower back.  She received 10 mg of oxycodone during PT this am.          Review of Systems   Constitutional: Negative for chills, diaphoresis, fever and weight loss.   HENT: Negative.    Eyes: Negative.    Respiratory: Negative for cough, sputum production, shortness of breath and wheezing.    Cardiovascular: Negative for chest pain, palpitations, orthopnea and leg swelling.   Gastrointestinal: Positive for constipation. Negative for abdominal pain, blood in stool, diarrhea, heartburn, nausea and vomiting.   Genitourinary: Negative for dysuria, frequency, hematuria and urgency.   Musculoskeletal: Positive for back pain. Negative for falls, joint pain, myalgias and neck pain.   Skin: Negative for itching and rash.   Neurological: Positive for tingling. Negative for dizziness, tremors, sensory change,  "speech change, focal weakness, seizures and headaches.   Psychiatric/Behavioral: Negative for depression. The patient is not nervous/anxious.            Past Medical History:   Diagnosis Date   • Anesthesia     \"woke up during surgery\"   • Arthritis    • Bowel habit changes     constipation   • Dental disorder     partial   • High cholesterol    • Hypertension    • Pain     both knees, right shoulder, hip area   • Renal disorder 2005    kidney infection   • Unspecified hemorrhagic conditions 2005    \"pulled IV from groin, had to hold pressure to stop bleeding and stayed additional day in the hospital\"   • Urinary incontinence        Current Facility-Administered Medications:   •  oxyCODONE immediate release  •  oxyCODONE immediate release  •  gabapentin  •  omeprazole  •  senna-docusate **AND** polyethylene glycol/lytes **AND** magnesium hydroxide **AND** bisacodyl  •  senna-docusate  •  DILTIAZem CD  •  Respiratory Care per Protocol  •  Pharmacy Consult Request  •  acetaminophen  •  artificial tears  •  benzocaine-menthol  •  hydrALAZINE  •  mag hydrox-al hydrox-simeth  •  ondansetron **OR** ondansetron  •  traZODone  •  sodium chloride  •  calcium carbonate  •  docusate sodium  •  tizanidine  •  hydroCHLOROthiazide  •  lisinopril  •  pregabalin  •  vitamin D      Past Surgical History:  Past Surgical History:   Procedure Laterality Date   • LUMBAR FUSION POSTERIOR  7/17/2018    Procedure: LUMBAR FUSION POSTERIOR- IN SITU POSS;  Surgeon: Robel Perez M.D.;  Location: [facility];  Service: Neurosurgery   • LUMBAR LAMINECTOMY DISKECTOMY  7/17/2018    Procedure: LUMBAR LAMINECTOMY DISKECTOMY L2-5;  Surgeon: Robel Perez M.D.;  Location: [facility];  Service: Neurosurgery   • DEBRIDEMENT  12/5/2013    Performed by Doni Merida M.D. at [facility]   • HIP ARTHROSCOPY  12/5/2013    Performed by Doni Merida M.D. at [facility]   • ORTHOPEDIC " OSTEOTOMY  12/5/2013    Performed by Doni Merida M.D. at SURGERY [facility]   • INCISION AND DRAINAGE GENERAL  1993    from infected tooth   • OTHER ORTHOPEDIC SURGERY      Michael knee replacement         Medication Allergy/Sensitivities:  No Known Allergies      Family History:  -Father Prostate Cancer  -Mother HTN, RA      Social History:  Smoking: Denies   Alcohol: Denies   Illictis: Denies   Living situation: Has an handicap apartment in [place]. Ms. Menezes lives with her ex .    Radiology                                           Results for orders placed during the hospital encounter of 05/17/18   MR-LUMBAR SPINE-W/O                                                                          Impression   FINDINGS:  There is levoconvex curvature of the lumbar spine. Vertebral body heights are maintained. There is moderate to severe diffuse and vertebral disc space narrowing throughout the lumbar spine. There is minimal grade 1 retrolisthesis of T12 on L1. There is   mild grade 1 anterolisthesis of L3 on 4. The conus medullaris has a normal caliber, course and signal intensity.    Level-specific findings as follows:    L1-2: There is minimal broad posterior disc bulge. There is mild facet arthropathy and ligamentum flavum hypertrophy. No significant central canal or neural foraminal stenosis.  L2-3: There is moderate broad posterior endplate spurring. There is moderate to marked facet arthropathy and ligamentum flavum hypertrophy. There is prominent dorsal epidural fat. There is severe central canal stenosis. There is severe right and moderate   left neural foraminal stenosis.  L3-4: There is moderate broad posterior disc bulge and endplate spurring. There is prominent dorsal epidural fat. There is marked facet arthropathy and ligamentum flavum hypertrophy. There is severe central canal stenosis. There is severe right and   moderate left neural foraminal stenosis.  L4-5: There is moderate broad  "posterior endplate spurring. There is prominent dorsal epidural fat. There is moderate to marked facet arthropathy and marked ligamentum flavum hypertrophy. There is severe central canal stenosis. There is moderate to severe   bilateral neural foraminal stenosis.  L5-S1: There is moderate broad posterior disc protrusion and endplate spurring. There is mild right and moderate left facet arthropathy. There is borderline central canal stenosis. There is moderate to severe right and severe left neural foraminal   stenosis.    Advanced multilevel degenerative changes of the lumbar spine as described above.                                             Physical Exam     Vitals:    08/06/18 0930 08/06/18 1300 08/06/18 1840 08/07/18 0900   BP: 116/80 100/68 123/76 (!) 98/64   Pulse:  86 83 64   Resp:  18 18 20   Temp:  36.2 °C (97.2 °F) 36.4 °C (97.6 °F) 36.3 °C (97.4 °F)   SpO2:  93% 93% 93%   Weight:       Height:         Body mass index is 32.83 kg/m².  BP (!) 98/64   Pulse 64   Temp 36.3 °C (97.4 °F)   Resp 20   Ht 1.651 m (5' 5\")   Wt 89.5 kg (197 lb 5 oz)   LMP 12/01/2013   SpO2 93%   BMI 32.83 kg/m²   O2 therapy: Pulse Oximetry: 93 %, O2 (LPM): 0, O2 Delivery: None (Room Air)    Physical Exam   Constitutional: She is oriented to person, place, and time and well-developed, well-nourished, and in no distress.   HENT:   Head: Normocephalic.   Right Ear: External ear normal.   Left Ear: External ear normal.   Nose: Nose normal.   Eyes: Pupils are equal, round, and reactive to light. Conjunctivae and EOM are normal.   Neck: Normal range of motion. Neck supple.   Cardiovascular: Normal rate, regular rhythm, normal heart sounds and intact distal pulses.    Pulmonary/Chest: Effort normal and breath sounds normal.   Abdominal: Soft. She exhibits no distension. There is no tenderness. There is no rebound and no guarding.   Musculoskeletal: She exhibits no edema or tenderness.   Neurological: She is alert and oriented to " person, place, and time.   Gait up with AFOs and walker.  Ms. Ceja was walking outside with FWW watering the flowers with PT stand by assist.            Assessment/Plan   Neuropathy with foot drop.    -Lumbar laminectomy with fusion on 7/17/18 with Dr. Perez.   - PT/OT   -Lumbar spine brace in place.   -AFOs for balance due to bilateral drop foot.    - Gabapentin capsule 900 mg, take one capsule three times daily  - Pregabalin capsule 300 mg, take one capsule daily.      HTN   - continue on Diltiazem CD capsule 240 mg, take one capsule by mouth daily  - continue lisinopril tablet 20 mg, take one tablet daily by mouth.    - continue hydrochlorothiazide tablet 25 mg, take one tablet by mouth daily.      Lower back pain  - Oxycodone 10 mg every 4 hours as needed for lower back pain.   - Zanaflex tablet 2 mg two times daily as needed for muscle spasticity   - Attempt to wean of opioids at tolerated.    - PT/OT for strengthening  - Back brace for support.      Constipation   -Senokot BID, Bowel protocol in place if needed.  - Attempt to wean opioids.    Hx Urinary incontinence -Resolved following surgery.

## 2018-08-07 NOTE — DISCHARGE PLANNING
Outpatient therapy referral sent to [facility] per choice form.    DME referral sent to [facility].  Awaiting responses.

## 2018-08-07 NOTE — REHAB-CM IDT TEAM NOTE
Case Management    DC Planning  DC destination/dispostion:  To return to her one story handicap assessable apartment where she lives with her ex- in [place], [place]     Referrals: f/u appointment with pcp, Dr. Pfeiffer and Dr Perez, neurosurgery.  Outpatient therapies in [place]    DC Needs: referral for new 4ww placed.  Has a spc and bs commode at home.      Barriers to discharge:  Lives with ex- who has dementia      Strengths: Supportive sister and boyfriend assist as needed.     Section completed by:  Gabriella Cook R.N.

## 2018-08-07 NOTE — PROGRESS NOTES
Rehab Progress Note     Encounter Date: 8/6/2018    CC: Decreased function after L2-L5 decompression and laminectomies    Interval Events (Subjective)  Patient evaluated with nurse practitioner student in room today. Patient reports she is doing well. She had some low blood pressures over the weekend but asymptomatic.  Discussed would review her blood pressure medications if any.  Otherwise she reports her pain is controlled on current opiate regimen.  Discussed at discharge can only provide two weeks.     IDT Team Meeting 8/1/2018  DC/Disposition:  8/10/18 <- decreased due to improvement. Will need medicaid approval    Objective:  Gen: NAD  Psych: Mood and affect appropriate  CV: RRR, no edema  Resp: CTAB, no upper airway sounds  Abd: NTND  Neuro:  AOx4, 5/5 BUE  Skin: Legs atrophied, bilateral knee scars    No results found for this or any previous visit (from the past 72 hour(s)).    Current Facility-Administered Medications   Medication Frequency   • oxyCODONE immediate release (ROXICODONE) tablet 10 mg Q4HRS PRN   • oxyCODONE immediate-release (ROXICODONE) tablet 15 mg Q4HRS PRN   • gabapentin (NEURONTIN) capsule 900 mg TID   • omeprazole (PRILOSEC) capsule 20 mg DAILY   • senna-docusate (PERICOLACE or SENOKOT S) 8.6-50 MG per tablet 2 Tab BID PRN    And   • polyethylene glycol/lytes (MIRALAX) PACKET 1 Packet QDAY PRN    And   • magnesium hydroxide (MILK OF MAGNESIA) suspension 30 mL QDAY PRN    And   • bisacodyl (DULCOLAX) suppository 10 mg QDAY PRN   • senna-docusate (PERICOLACE or SENOKOT S) 8.6-50 MG per tablet 1 Tab BID   • DILTIAZem CD (CARDIZEM CD) capsule 240 mg DAILY   • Respiratory Care per Protocol Continuous RT   • Pharmacy Consult Request ...Pain Management Review 1 Each PRN   • acetaminophen (TYLENOL) tablet 650 mg Q4HRS PRN   • artificial tears 1.4 % ophthalmic solution 1 Drop PRN   • benzocaine-menthol (CEPACOL) lozenge 1 Lozenge Q2HRS PRN   • hydrALAZINE (APRESOLINE) tablet 25 mg Q8HRS PRN   •  mag hydrox-al hydrox-simeth (MAALOX PLUS ES or MYLANTA DS) suspension 20 mL Q2HRS PRN   • ondansetron (ZOFRAN ODT) dispertab 4 mg 4X/DAY PRN    Or   • ondansetron (ZOFRAN) syringe/vial injection 4 mg 4X/DAY PRN   • traZODone (DESYREL) tablet 50 mg QHS PRN   • sodium chloride (OCEAN) 0.65 % nasal spray 2 Spray PRN   • calcium carbonate (TUMS) chewable tab 500 mg BID   • docusate sodium (COLACE) capsule 100 mg BID   • tizanidine (ZANAFLEX) tablet 2 mg TID PRN   • hydroCHLOROthiazide (HYDRODIURIL) tablet 25 mg DAILY   • lisinopril (PRINIVIL) tablet 20 mg DAILY   • pregabalin (LYRICA) capsule 300 mg DAILY   • vitamin D (cholecalciferol) tablet 2,000 Units DAILY       Orders Placed This Encounter   Procedures   • Diet Order Regular     Standing Status:   Standing     Number of Occurrences:   1     Order Specific Question:   Diet:     Answer:   Regular [1]       Assessment:  Active Hospital Problems    Diagnosis   • *Lumbar stenosis with neurogenic claudication   • HTN (hypertension)   • High cholesterol   • Pain   • Bowel habit changes       Medical Decision Making and Plan:  Severe lumbar stenosis w cauda equina - Patient with pre-operative bowel/bladder changes as well as decreased sensation and bilateral foot drop suggestive of cauda equina.  Patient is s/p L2-L5 decompression and posterior fusion with Dr. Perez on 7/18/18.  Since surgery patient has improved with bowel/bladder but continues to have weakness and decreased sensation.  -LSO when OOB with spinal precautions  -PT and OT to evaluate and treat mobility and ADLs.  Patient would benefit from custom articulated ankle-foot orthotics for in home mobility and ADLs.  Patient received custom AFOs, significant improvement with bilateral AFOs    -Follow-up with Dr. Perez' clinic 2 weeks and 6 weeks post-discharge     Neurogenic Bowel/Bladder - Patient with constipation and urinary incontinence prior to surgery. Appears to be improved but will need to monitor.  Will  need to check PVRs. Bladder scans 52 and 64 after voiding. Can discontinue as improving sensation and minimal residuals.  -Continue scheduled Senna-Docusate.  Discussed continued use on high dose opiates to avoid constipation. Increased docusate to 200 mg BID     Pain - Patient with both neuropathic and post-operative pain.  Discussed need to reduce opiate use.  Currently on Gabapentin 600 mg TID and Lyrica 300 mg daily.  Oxycodone and Tylenol PRN.    -Utilizing significant Oxycodone dosage, discussed decreased reliance. Increase Gabapentin to 900 mg TID. Will reduce Oxycodone to Q4H. Again long discussion about opiate wean. Discussed maybe moving Lyrica to night time    HTN/CV - Patient on HCTZ 25 mg daily, Lisinopril 20 mg daily, Diltiazem  mg daily. SBP remains low over the weekend. Will monitor for 24 hours and address     Vitamin D - On supplementation, admission labs within normal limits. Continue 2000 daily     GI Ppx - Patient on prilosec      DVT Ppx - Patient ambulating + 300 feet on 8/3/18. Will stop Lovenox     Total time:  25 minutes.  I spent greater than 50% of the time for patient care, counseling, and coordination on this date, including unit/floor time, and face-to-face time with the patient as per interval events and assessment and plan above. Topics discussed included constipation and opiate use. Discussed observing blood pressure.       Coni Hope M.D.

## 2018-08-07 NOTE — PROGRESS NOTES
"Rehab Progress Note     Encounter Date: 8/7/2018    CC: Decreased function after L2-L5 decompression and laminectomies    Interval Events (Subjective)  Patient sitting up in wheelchair. She reports doing significantly more walking today around the unit and outside. She reports her pain is controlled. With increase walking concern for orthostatics, patient's SBP ~100.  Will adjust blood pressure medications.     IDT Team Meeting 8/1/2018  DC/Disposition:  8/10/18 <- decreased due to improvement. Will need medicaid approval    Objective:  VITALS: /64   Pulse 76   Temp 36.2 °C (97.2 °F)   Resp 18   Ht 1.651 m (5' 5\")   Wt 89.5 kg (197 lb 5 oz)   LMP 12/01/2013   SpO2 94%   BMI 32.83 kg/m²   Gen: NAD  Psych: Mood and affect appropriate  CV: RRR, no edema  Resp: CTAB, no upper airway sounds  Abd: NTND  Neuro:  AOx4, 5/5 BUE  Skin: Legs atrophied, bilateral knee scars  Unchanged from 8/6/18    No results found for this or any previous visit (from the past 72 hour(s)).    Current Facility-Administered Medications   Medication Frequency   • docusate sodium (COLACE) capsule 200 mg BID   • oxyCODONE immediate release (ROXICODONE) tablet 10 mg Q4HRS PRN   • oxyCODONE immediate-release (ROXICODONE) tablet 15 mg Q4HRS PRN   • gabapentin (NEURONTIN) capsule 900 mg TID   • omeprazole (PRILOSEC) capsule 20 mg DAILY   • senna-docusate (PERICOLACE or SENOKOT S) 8.6-50 MG per tablet 2 Tab BID PRN    And   • polyethylene glycol/lytes (MIRALAX) PACKET 1 Packet QDAY PRN    And   • magnesium hydroxide (MILK OF MAGNESIA) suspension 30 mL QDAY PRN    And   • bisacodyl (DULCOLAX) suppository 10 mg QDAY PRN   • senna-docusate (PERICOLACE or SENOKOT S) 8.6-50 MG per tablet 1 Tab BID   • DILTIAZem CD (CARDIZEM CD) capsule 240 mg DAILY   • Respiratory Care per Protocol Continuous RT   • Pharmacy Consult Request ...Pain Management Review 1 Each PRN   • acetaminophen (TYLENOL) tablet 650 mg Q4HRS PRN   • artificial tears 1.4 % " ophthalmic solution 1 Drop PRN   • benzocaine-menthol (CEPACOL) lozenge 1 Lozenge Q2HRS PRN   • hydrALAZINE (APRESOLINE) tablet 25 mg Q8HRS PRN   • mag hydrox-al hydrox-simeth (MAALOX PLUS ES or MYLANTA DS) suspension 20 mL Q2HRS PRN   • ondansetron (ZOFRAN ODT) dispertab 4 mg 4X/DAY PRN    Or   • ondansetron (ZOFRAN) syringe/vial injection 4 mg 4X/DAY PRN   • traZODone (DESYREL) tablet 50 mg QHS PRN   • sodium chloride (OCEAN) 0.65 % nasal spray 2 Spray PRN   • calcium carbonate (TUMS) chewable tab 500 mg BID   • tizanidine (ZANAFLEX) tablet 2 mg TID PRN   • lisinopril (PRINIVIL) tablet 20 mg DAILY   • pregabalin (LYRICA) capsule 300 mg DAILY   • vitamin D (cholecalciferol) tablet 2,000 Units DAILY       Orders Placed This Encounter   Procedures   • Diet Order Regular     Standing Status:   Standing     Number of Occurrences:   1     Order Specific Question:   Diet:     Answer:   Regular [1]       Assessment:  Active Hospital Problems    Diagnosis   • *Lumbar stenosis with neurogenic claudication   • HTN (hypertension)   • High cholesterol   • Pain   • Bowel habit changes       Medical Decision Making and Plan:  Severe lumbar stenosis w cauda equina - Patient with pre-operative bowel/bladder changes as well as decreased sensation and bilateral foot drop suggestive of cauda equina.  Patient is s/p L2-L5 decompression and posterior fusion with Dr. Perez on 7/18/18.  Since surgery patient has improved with bowel/bladder but continues to have weakness and decreased sensation.  -LSO when OOB with spinal precautions  -PT and OT to evaluate and treat mobility and ADLs.  Patient would benefit from custom articulated ankle-foot orthotics for in home mobility and ADLs.  Patient received custom AFOs, significant improvement with bilateral AFOs    -Follow-up with Dr. Perez' clinic 2 weeks and 6 weeks post-discharge     Neurogenic Bowel/Bladder - Patient with constipation and urinary incontinence prior to surgery. Appears to  be improved but will need to monitor.  Will need to check PVRs. Bladder scans 52 and 64 after voiding. Can discontinue as improving sensation and minimal residuals.  -Continue scheduled Senna-Docusate.  Discussed continued use on high dose opiates to avoid constipation. Increased docusate to 200 mg BID     Pain - Patient with both neuropathic and post-operative pain.  Discussed need to reduce opiate use.  Currently on Gabapentin 600 mg TID and Lyrica 300 mg daily.  Oxycodone and Tylenol PRN.    -Utilizing significant Oxycodone dosage, discussed decreased reliance. Increase Gabapentin to 900 mg TID. Will reduce Oxycodone to Q4H. Again long discussion about opiate wean. Discussed maybe moving Lyrica to night time    HTN/CV - Patient on HCTZ 25 mg daily, Lisinopril 20 mg daily, Diltiazem  mg daily. SBP remains low and patient ambulating more. Will discontinue HCTZ and monitor.      Vitamin D - On supplementation, admission labs within normal limits. Continue 2000 daily     GI Ppx - Patient on prilosec      DVT Ppx - Patient ambulating + 300 feet on 8/3/18. Will stop Lovenox     Total time:  25 minutes.  I spent greater than 50% of the time for patient care, counseling, and coordination on this date, including unit/floor time, and face-to-face time with the patient as per interval events and assessment and plan above. Topics discussed included pain control and stop HCTZ.  Will monitor for decrease in other medications.       Coni Hope M.D.

## 2018-08-07 NOTE — REHAB-PHARMACY IDT TEAM NOTE
Pharmacy   Pharmacy  Antibiotics/Antifungals/Antivirals:  Medication:      Active Orders     None        Route:         n/a  Stop Date:  n/a  Reason:   Antihypertensives/Cardiac:  Medication:    Orders (72h ago through future)    Start     Ordered    07/25/18 0900  DILTIAZem CD (CARDIZEM CD) capsule 240 mg  DAILY     Comments:  This medication has been substituted for Tiaztia XT per P&T Therapeutic Substitution Protocol.    07/24/18 1225    07/24/18 0900  hydroCHLOROthiazide (HYDRODIURIL) tablet 25 mg  DAILY      07/23/18 1753    07/24/18 0900  lisinopril (PRINIVIL) tablet 20 mg  DAILY      07/23/18 1753    07/23/18 1753  hydrALAZINE (APRESOLINE) tablet 25 mg  EVERY 8 HOURS PRN      07/23/18 1753        Patient Vitals for the past 24 hrs:   BP Pulse   08/07/18 0900 (!) 98/64 64   08/06/18 1840 123/76 83   08/06/18 1300 100/68 86       Anticoagulation:  Medication:  n/a  INR:      Other key medications: gabapentin, omeprazole, pregabalin, tizanidine    Section completed by:  Renu Garcia [facility]

## 2018-08-07 NOTE — CARE PLAN
Problem: Safety  Goal: Will remain free from injury  Pt a/o x 4. Took scheduled meds whole with thin liquids, no coughing observed, pt wears boots for foot drup; SR up x 2, non skid socks/shoes when OOB, hourly rounding maintained    Problem: Infection  Goal: Will remain free from infection  Pt's lower back incision healing well, no s/s of infection observed, area kept dry and clean, will continue to monitor.    Problem: Bowel/Gastric:  Goal: Normal bowel function is maintained or improved  Pt stated her last bowel movement was today 8/6, medium to large in size, no difficulty moving, scheduled stool softeners administered. Patient continent of both bowel and bladder.

## 2018-08-08 PROCEDURE — A9270 NON-COVERED ITEM OR SERVICE: HCPCS | Performed by: PHYSICAL MEDICINE & REHABILITATION

## 2018-08-08 PROCEDURE — 99233 SBSQ HOSP IP/OBS HIGH 50: CPT | Performed by: PHYSICAL MEDICINE & REHABILITATION

## 2018-08-08 PROCEDURE — 770010 HCHG ROOM/CARE - REHAB SEMI PRIVAT*

## 2018-08-08 PROCEDURE — 97535 SELF CARE MNGMENT TRAINING: CPT

## 2018-08-08 PROCEDURE — 97110 THERAPEUTIC EXERCISES: CPT

## 2018-08-08 PROCEDURE — 700112 HCHG RX REV CODE 229: Performed by: PHYSICAL MEDICINE & REHABILITATION

## 2018-08-08 PROCEDURE — 700102 HCHG RX REV CODE 250 W/ 637 OVERRIDE(OP): Performed by: PHYSICAL MEDICINE & REHABILITATION

## 2018-08-08 PROCEDURE — 97530 THERAPEUTIC ACTIVITIES: CPT

## 2018-08-08 PROCEDURE — 97116 GAIT TRAINING THERAPY: CPT

## 2018-08-08 RX ORDER — LISINOPRIL 5 MG/1
10 TABLET ORAL DAILY
Status: DISCONTINUED | OUTPATIENT
Start: 2018-08-09 | End: 2018-08-09

## 2018-08-08 RX ADMIN — CALCIUM CARBONATE (ANTACID) CHEW TAB 500 MG 500 MG: 500 CHEW TAB at 20:27

## 2018-08-08 RX ADMIN — GABAPENTIN 900 MG: 300 CAPSULE ORAL at 08:14

## 2018-08-08 RX ADMIN — OXYCODONE HYDROCHLORIDE 10 MG: 10 TABLET ORAL at 08:14

## 2018-08-08 RX ADMIN — OXYCODONE HYDROCHLORIDE 10 MG: 10 TABLET ORAL at 03:30

## 2018-08-08 RX ADMIN — SENNOSIDES AND DOCUSATE SODIUM 1 TABLET: 8.6; 5 TABLET ORAL at 08:14

## 2018-08-08 RX ADMIN — DOCUSATE SODIUM 200 MG: 100 CAPSULE, LIQUID FILLED ORAL at 08:13

## 2018-08-08 RX ADMIN — PREGABALIN 300 MG: 100 CAPSULE ORAL at 08:14

## 2018-08-08 RX ADMIN — LISINOPRIL 20 MG: 20 TABLET ORAL at 08:14

## 2018-08-08 RX ADMIN — DOCUSATE SODIUM 200 MG: 100 CAPSULE, LIQUID FILLED ORAL at 20:27

## 2018-08-08 RX ADMIN — GABAPENTIN 900 MG: 300 CAPSULE ORAL at 14:12

## 2018-08-08 RX ADMIN — OXYCODONE HYDROCHLORIDE 10 MG: 10 TABLET ORAL at 12:21

## 2018-08-08 RX ADMIN — VITAMIN D, TAB 1000IU (100/BT) 2000 UNITS: 25 TAB at 08:14

## 2018-08-08 RX ADMIN — DILTIAZEM HYDROCHLORIDE 240 MG: 120 CAPSULE, COATED, EXTENDED RELEASE ORAL at 08:13

## 2018-08-08 RX ADMIN — OXYCODONE HYDROCHLORIDE 10 MG: 10 TABLET ORAL at 22:07

## 2018-08-08 RX ADMIN — CALCIUM CARBONATE (ANTACID) CHEW TAB 500 MG 500 MG: 500 CHEW TAB at 08:13

## 2018-08-08 RX ADMIN — SENNOSIDES AND DOCUSATE SODIUM 1 TABLET: 8.6; 5 TABLET ORAL at 20:28

## 2018-08-08 RX ADMIN — OMEPRAZOLE 20 MG: 20 CAPSULE, DELAYED RELEASE ORAL at 08:14

## 2018-08-08 RX ADMIN — OXYCODONE HYDROCHLORIDE 10 MG: 10 TABLET ORAL at 17:28

## 2018-08-08 RX ADMIN — GABAPENTIN 900 MG: 300 CAPSULE ORAL at 20:27

## 2018-08-08 ASSESSMENT — PAIN SCALES - GENERAL
PAINLEVEL_OUTOF10: 5
PAINLEVEL_OUTOF10: 8
PAINLEVEL_OUTOF10: 8
PAINLEVEL_OUTOF10: 6

## 2018-08-08 NOTE — CARE PLAN
Problem: Bowel/Gastric:  Goal: Will not experience complications related to bowel motility    Intervention: Assess baseline bowel pattern  Patient took scheduled bowel medications this am, educated to drink plenty of fluids due to constipation from narcotic use.      Problem: Skin Integrity  Goal: Risk for impaired skin integrity will decrease    Intervention: Assess risk factors for impaired skin integrity and/or pressure ulcers  Lower back surgical site assessed and is clean, approximated, SHANIA, no obvious s/s of infection, will continue to monitor.

## 2018-08-08 NOTE — PROGRESS NOTES
Patient is AOx4 has good safety awareness and is Mod I in her room with FWW. Patient states her pain is controlled at intervals with scheduled and prn pain management. Patient has been up participating in therapies.

## 2018-08-08 NOTE — REHAB-PT IDT TEAM NOTE
"Physical Therapy   Mobility  Bed mobility:   SPV  Bed /Chair/Wheelchair Transfer Initial:  3 - Moderate Assistance  Bed /Chair/Wheelchair Transfer Current:  5 - Standby Prompting/Supervision or Set-up   Bed/Chair/Wheelchair Transfer Description:  Increased time, Supervision for safety, Set-up of equipment, Initial preparation for task (SPT to/from EOB sitting and manual wc)  Walk Initial:  1 - Total Assistance  Walk Current:  6 - Modified Independent   Walk Description:  Walker, Extra time (Lori for indoor surfaces 200-250')  Wheelchair Initial:  1 - Total Assistance  Wheelchair Current:  6 - Modified Independent   Wheelchair Description:  Extra time  Stairs Initial:  2 - Max Assistance  Stairs Current: 2 - Max Assistance   Stairs Description: Hand rails, Extra time, Verbal cueing, Supervision for safety, Ascends/descends 4 to 11 steps (8(6\") steps B HR SHABNAM, vc for sequencing)  Patient/Family Training/Education:  Ongoing with fair to good carryover  DME/DC Recommendations:  2 days; Pt currently has DMITRY RolleO, 4WW; Outpatient PT; Intermittent SPV  Strengths:  Able to follow instructions, Effective communication skills, Making steady progress towards goals, Motivated for self care and independence, Pleasant and cooperative and Willingly participates in therapeutic activities  Barriers:   Generalized weakness, Poor balance and Other: Spinal precautions; Moderate pain levels vary  # of short term goals set= 4  # of short term goals met= 3       Physical Therapy Problems           Problem: Balance     Dates: Start: 07/24/18       Goal: STG-Within one week, patient will maintain static standing     Dates: Start: 07/24/18       Description: 1) Individualized goal:  Unsupported for 1 min with SPV  2) Interventions:  PT Gait Training, PT Therapeutic Exercises, PT Neuro Re-Ed/Balance and PT Therapeutic Activity       Note:     Goal Note filed on 08/08/18 1255 by Robel Hilton, PT    Goal: STG-Within one week, " patient will maintain static standing  Outcome: NOT MET  Pt can stand for 40 seconds without UE support but 20+ minutes with BUE   support.                  Problem: Mobility     Dates: Start: 07/24/18       Goal: STG-Within one week, patient will ambulate community distances     Dates: Start: 08/08/18       Description: 1) Individualized goal: With 4WW 500' Lori  2) Interventions: PT Gait Training, PT Therapeutic Exercises, PT Neuro Re-Ed/Balance and PT Therapeutic Activity                Goal: STG-Within one week, patient will ambulate up/down a curb     Dates: Start: 08/08/18       Description: 1) Individualized goal: Ascend/descend curb with 4WW SPV  2) Interventions: PT Gait Training, PT Therapeutic Exercises, PT Neuro Re-Ed/Balance and PT Therapeutic Activity                  Problem: Mobility Transfers     Dates: Start: 07/24/18       Goal: STG-Within one week, patient will transfer bed to chair     Dates: Start: 08/08/18       Description: 1) Individualized goal: With 4WW Lori, maintaining spinal precautions  2) Interventions: PT Gait Training, PT Therapeutic Exercises, PT Neuro Re-Ed/Balance and PT Therapeutic Activity                  Goal: STG-Within one week, patient will transfer in/out of car     Dates: Start: 08/08/18       Description: 1) Individualized goal: With 4WW Lori, maintaining spinal precautions  2) Interventions: PT Gait Training, PT Therapeutic Exercises, PT Neuro Re-Ed/Balance and PT Therapeutic Activity                  Problem: PT-Long Term Goals     Dates: Start: 07/24/18       Goal: LTG-By discharge, patient will tolerate standing     Dates: Start: 07/24/18       Description: 1) Individualized goal:  5 min without increased pain  2) Interventions: PT Gait Training, PT Therapeutic Exercises, PT Neuro Re-Ed/Balance and PT Therapeutic Activity               Goal: LTG-By discharge, patient will ambulate     Dates: Start: 07/24/18       Description: 1) Individualized goal:  With 4WW 500'  Lori  2) Interventions: PT Gait Training, PT Therapeutic Exercises, PT Neuro Re-Ed/Balance and PT Therapeutic Activity               Goal: LTG-By discharge, patient will transfer one surface to another     Dates: Start: 07/24/18       Description: 1) Individualized goal:  With 4WW Lori  2) Interventions:  PT Gait Training, PT Therapeutic Exercises, PT Neuro Re-Ed/Balance and PT Therapeutic Activity               Goal: LTG-By discharge, patient will transfer in/out of a car     Dates: Start: 07/24/18       Description: 1) Individualized goal:  With 4WW Lori, maintaining spinal precautions  2) Interventions:  PT Gait Training, PT Therapeutic Exercises, PT Neuro Re-Ed/Balance and PT Therapeutic Activity               Goal: LTG-By discharge, patient will     Dates: Start: 07/24/18       Description: 1) Individualized goal:  Ascend/descend curb with 4WW SPV  2) Interventions:  PT Gait Training, PT Therapeutic Exercises, PT Neuro Re-Ed/Balance and PT Therapeutic Activity                       Section completed by:  Robel Hilton, PT, DPT

## 2018-08-08 NOTE — CARE PLAN
Problem: Safety  Goal: Will remain free from injury  Pt a/o x 4. Took scheduled meds whole with thin liquids, no coughing observed, pt wears boots for foot drup; SR up x 2, non skid socks/shoes when OOB, hourly rounding maintained    Patient is now mod I in the room with a FWW    Problem: Infection  Goal: Will remain free from infection  Pt's lower back incision healing well, no s/s of infection observed, area kept dry and clean, will continue to monitor.    Problem: Bowel/Gastric:  Goal: Normal bowel function is maintained or improved  Pt stated her last bowel movement was yesterday 8/6-- scheduled stool softeners administered. Patient continent of both bowel and bladder. Will continue to monitor.

## 2018-08-08 NOTE — REHAB-NURSING IDT TEAM NOTE
Nursing   Nursing  Diet/Nutrition:  Regular and Thin Liquids  Medication Administration:  Whole with Liquid Wash  % consumed at meals in last 24 hours:  Consumed of meals per documentation.  Meal/Snack Consumption for the past 24 hrs:   Oral Nutrition   08/07/18 1900 Dinner;Between % Consumed   08/07/18 1223 Lunch;Between % Consumed   08/07/18 0900 Between % Consumed       Appetite:  Excellent  Fluid Intake/Output in past 24 hours: In: 960 [P.O.:960]  Out: -   Admit Weight:  Weight: 90.7 kg (200 lb)  Weight Last 7 Days       Pain Issues:    Location:  Back (08/07 1858)  Lower (08/07 1858)         Severity:  Moderate   Scheduled pain medications:  neurontin    PRN pain medications used in last 24 hours:  oxycodone immediate release (ROXICODONE)    Non Pharmacologic Interventions:  emotional support, environmental changes, relaxation, repositioned and shower  Effectiveness of pain management plan:  good=patient states acceptable comfort after interventions    Bowel:    Bowel Assist Initial Score:  3 - Moderate Assistance  Bowel Assist Current Score:  6 - Modified Independent  Bowl Accidents in last 7 days:     Last bowel movement: 08/07/18  Stool Description: Medium, Soft     Usual bowel pattern:  daily  Scheduled bowel medications:  docusate sodium (COLACE) and senna-docusate (PERICOLACE or SENOKOT S)   Nursing Interventions:  Increased time, PRN medication  Effectiveness of bowel program:   good=regular, predictable, controlled emptying of bowel  Bladder:    Bladder Assist Initial Score:  3 - Moderate Assistance  Bladder Assist Current Score:  6 - Modified Independent  Time void schedule/voiding pattern:  Pt continent, voiding normal regular pattern  Interventions:  ISupervision  Effectiveness of bladder training:  Good=regular, predictable, emptying of bladder, patient initiates time voiding      Wound:         Patient Lines/Drains/Airways Status    Active Current Wounds     Name: Placement date:  Placement time: Site: Days:    Surgical Incision  Incision Back 07/17/18   1010      21    Wound Skin Tear Back Left Middle 07/20/18   0900    18                   Interventions:   Effectiveness of intervention:  wound is improving     Sleep/wake cycle:   Average hours slept:  Sleeps 3-4 hours without waking  Sleep medication usage:  None    Patient/Family Training/Education:  Bladder Management/Training, Bowel Management/Training, Diet/Nutrition, Fall Prevention, General Self Care, Safe Transfers, Safety, Skin Care and Wound Care  Strengths: Able to follow instructions, Supportive family, Pleasant and cooperative, Effective communication skills, Good carryover of learning and Motivated for self care and independence   Barriers:   Generalized weakness         Long Term Goals:   At discharge patient will be able to function safely at home with support. and At discharge patient will be able to function safely at home and in the community with support.    Section completed by:  Madeleine Gupta R.N.    Read and reviewed by Shanique Dove R.N.

## 2018-08-08 NOTE — CARE PLAN
Problem: Toileting  Goal: STG-Within one week, patient will complete toileting tasks  1) Individualized Goal:  Mod Indep w/ AE/DME PRN  2) Interventions:  OT Self Care/ADL, OT Neuro Re-Ed/Balance, OT Therapeutic Activity and OT Evaluation     Outcome: NOT MET  Pt at Min assist for toilet hygiene w/ DME    Problem: Functional Transfers  Goal: STG-Within one week, patient will transfer to toilet  1) Individualized Goal: Mod Indep w/ DME PRN  2) Interventions:  OT Self Care/ADL, OT Neuro Re-Ed/Balance, OT Therapeutic Activity, OT Evaluation and OT Therapeutic Exercise     Outcome: MET Date Met: 08/08/18  Pt at Mod Indep w/ commode transfers via DME/grab bars  Goal: STG-Within one week, patient will transfer to tub/shower  1) Individualized Goal: Mod Indep w/ DME PRN  2) Interventions: OT Self Care/ADL, OT Neuro Re-Ed/Balance, OT Therapeutic Activity, OT Evaluation and OT Therapeutic Exercise     Outcome: NOT MET  Pt st Supervision w/ DME

## 2018-08-08 NOTE — CARE PLAN
Problem: Balance  Goal: STG-Within one week, patient will maintain static standing  1) Individualized goal:  Unsupported for 1 min with SPV  2) Interventions:  PT Gait Training, PT Therapeutic Exercises, PT Neuro Re-Ed/Balance and PT Therapeutic Activity     Outcome: NOT MET  Pt can stand for 40 seconds without UE support but 20+ minutes with BUE support.    Problem: Mobility  Goal: STG-Within one week, patient will ambulate household distance  1) Individualized goal:  With 4WW SPV without increased pain  2) Interventions: PT Gait Training, PT Therapeutic Exercises, PT Neuro Re-Ed/Balance and PT Therapeutic Activity       Outcome: MET Date Met: 08/08/18    Goal: STG-Within one week, patient will ambulate up/down a curb  1) Individualized goal:  With 4WW with CGA  2) Interventions:  PT Gait Training, PT Therapeutic Exercises, PT Neuro Re-Ed/Balance and PT Therapeutic Activity       Outcome: MET Date Met: 08/08/18      Problem: Mobility Transfers  Goal: STG-Within one week, patient will perform bed mobility  1) Individualized goal:  SPV with flat bed, maintaining spinal precautions  2) Interventions: PT Gait Training, PT Therapeutic Exercises, PT Neuro Re-Ed/Balance and PT Therapeutic Activity       Outcome: MET Date Met: 08/08/18

## 2018-08-08 NOTE — CARE PLAN
Problem: Toileting  Goal: STG-Within one week, patient will complete toileting tasks  1) Individualized Goal:  Mod Indep w/ AE/DME PRN  2) Interventions:  OT Self Care/ADL, OT Neuro Re-Ed/Balance, OT Therapeutic Activity and OT Evaluation     Outcome: NOT MET  Pt at Sup/SBA for toileting

## 2018-08-08 NOTE — REHAB-COLLABORATIVE ONGOING IDT TEAM NOTE
Weekly Interdisciplinary Team Conference Note    Weekly Interdisciplinary Team Conference # 3  Date:  8/8/2018    Clinicians present and reporting at team conference include the following:   MD: Coni Hope MD   RN:  Shanique Dove RN   PT:   Elkin Hilton PT, DPT  OT:  Cali Murdock MS OTR/L   ST:  Not Applicable  CM:  Gabriella Cook RN  REC:  None  RT:  None  RPh:  Renu Garcia [facility]  Other:   None  All reporting clinicians have a working knowledge of this patient's plan of care.    Targeted DC Date:  8/10/2018     Medical    Patient Active Problem List    Diagnosis Date Noted   • HTN (hypertension) 07/24/2018   • High cholesterol 07/24/2018   • Pain 07/24/2018   • Bowel habit changes 07/24/2018   • Lumbar stenosis with neurogenic claudication 07/20/2018   • Arthralgia of knee, right 04/10/2017   • Essential hypertension 03/27/2017   • Family history of coronary artery disease 03/13/2017   • Articular cartilage disorder, pelvic region and thigh 12/05/2013   • Disability examination 05/09/2012     Results     ** No results found for the last 24 hours. **           Nursing  Diet/Nutrition:  Regular and Thin Liquids  Medication Administration:  Whole with Liquid Wash  % consumed at meals in last 24 hours:  Consumed of meals per documentation.  Meal/Snack Consumption for the past 24 hrs:   Oral Nutrition   08/07/18 1900 Dinner;Between % Consumed   08/07/18 1223 Lunch;Between % Consumed   08/07/18 0900 Between % Consumed       Appetite:  Excellent  Fluid Intake/Output in past 24 hours: In: 960 [P.O.:960]  Out: -   Admit Weight:  Weight: 90.7 kg (200 lb)  Weight Last 7 Days       Pain Issues:    Location:  Back (08/07 1858)  Lower (08/07 1858)         Severity:  Moderate   Scheduled pain medications:  neurontin    PRN pain medications used in last 24 hours:  oxycodone immediate release (ROXICODONE)    Non Pharmacologic Interventions:  emotional support, environmental changes,  relaxation, repositioned and shower  Effectiveness of pain management plan:  good=patient states acceptable comfort after interventions    Bowel:    Bowel Assist Initial Score:  3 - Moderate Assistance  Bowel Assist Current Score:  6 - Modified Independent  Bowl Accidents in last 7 days:     Last bowel movement: 08/07/18  Stool Description: Medium, Soft     Usual bowel pattern:  daily  Scheduled bowel medications:  docusate sodium (COLACE) and senna-docusate (PERICOLACE or SENOKOT S)   Nursing Interventions:  Increased time, PRN medication  Effectiveness of bowel program:   good=regular, predictable, controlled emptying of bowel  Bladder:    Bladder Assist Initial Score:  3 - Moderate Assistance  Bladder Assist Current Score:  6 - Modified Independent  Time void schedule/voiding pattern:  Pt continent, voiding normal regular pattern  Interventions:  ISupervision  Effectiveness of bladder training:  Good=regular, predictable, emptying of bladder, patient initiates time voiding      Wound:         Patient Lines/Drains/Airways Status    Active Current Wounds     Name: Placement date: Placement time: Site: Days:    Surgical Incision  Incision Back 07/17/18   1010      21    Wound Skin Tear Back Left Middle 07/20/18   0900    18                   Interventions:   Effectiveness of intervention:  wound is improving     Sleep/wake cycle:   Average hours slept:  Sleeps 3-4 hours without waking  Sleep medication usage:  None    Patient/Family Training/Education:  Bladder Management/Training, Bowel Management/Training, Diet/Nutrition, Fall Prevention, General Self Care, Safe Transfers, Safety, Skin Care and Wound Care  Strengths: Able to follow instructions, Supportive family, Pleasant and cooperative, Effective communication skills, Good carryover of learning and Motivated for self care and independence   Barriers:   Generalized weakness         Long Term Goals:   At discharge patient will be able to function safely at home  "with support. and At discharge patient will be able to function safely at home and in the community with support.    Section completed by:  Madeleine Gupta R.N.    Read and reviewed by Shanique Dove R.N.          Mobility  Bed mobility:   SPV  Bed /Chair/Wheelchair Transfer Initial:  3 - Moderate Assistance  Bed /Chair/Wheelchair Transfer Current:  5 - Standby Prompting/Supervision or Set-up   Bed/Chair/Wheelchair Transfer Description:  Increased time, Supervision for safety, Set-up of equipment, Initial preparation for task (SPT to/from EOB sitting and manual wc)  Walk Initial:  1 - Total Assistance  Walk Current:  6 - Modified Independent   Walk Description:  Walker, Extra time (Lori for indoor surfaces 200-250')  Wheelchair Initial:  1 - Total Assistance  Wheelchair Current:  6 - Modified Independent   Wheelchair Description:  Extra time  Stairs Initial:  2 - Max Assistance  Stairs Current: 2 - Max Assistance   Stairs Description: Hand rails, Extra time, Verbal cueing, Supervision for safety, Ascends/descends 4 to 11 steps (8(6\") steps B HR SHABNAM, vc for sequencing)  Patient/Family Training/Education:  Ongoing with fair to good carryover  DME/DC Recommendations:  2 days; Pt currently has B Bimal gagnon, LSO, 4WW; Outpatient PT; Intermittent SPV  Strengths:  Able to follow instructions, Effective communication skills, Making steady progress towards goals, Motivated for self care and independence, Pleasant and cooperative and Willingly participates in therapeutic activities  Barriers:   Generalized weakness, Poor balance and Other: Spinal precautions; Moderate pain levels vary  # of short term goals set= 4  # of short term goals met= 3       Physical Therapy Problems           Problem: Balance     Dates: Start: 07/24/18       Goal: STG-Within one week, patient will maintain static standing     Dates: Start: 07/24/18       Description: 1) Individualized goal:  Unsupported for 1 min with SPV  2) Interventions:  " PT Gait Training, PT Therapeutic Exercises, PT Neuro Re-Ed/Balance and PT Therapeutic Activity       Note:     Goal Note filed on 08/08/18 5445 by Robel Hilton, PT    Goal: STG-Within one week, patient will maintain static standing  Outcome: NOT MET  Pt can stand for 40 seconds without UE support but 20+ minutes with BUE   support.                  Problem: Mobility     Dates: Start: 07/24/18       Goal: STG-Within one week, patient will ambulate community distances     Dates: Start: 08/08/18       Description: 1) Individualized goal: With 4WW 500' Lori  2) Interventions: PT Gait Training, PT Therapeutic Exercises, PT Neuro Re-Ed/Balance and PT Therapeutic Activity                Goal: STG-Within one week, patient will ambulate up/down a curb     Dates: Start: 08/08/18       Description: 1) Individualized goal: Ascend/descend curb with 4WW SPV  2) Interventions: PT Gait Training, PT Therapeutic Exercises, PT Neuro Re-Ed/Balance and PT Therapeutic Activity                  Problem: Mobility Transfers     Dates: Start: 07/24/18       Goal: STG-Within one week, patient will transfer bed to chair     Dates: Start: 08/08/18       Description: 1) Individualized goal: With 4WW Lori, maintaining spinal precautions  2) Interventions: PT Gait Training, PT Therapeutic Exercises, PT Neuro Re-Ed/Balance and PT Therapeutic Activity                  Goal: STG-Within one week, patient will transfer in/out of car     Dates: Start: 08/08/18       Description: 1) Individualized goal: With 4WW Lori, maintaining spinal precautions  2) Interventions: PT Gait Training, PT Therapeutic Exercises, PT Neuro Re-Ed/Balance and PT Therapeutic Activity                  Problem: PT-Long Term Goals     Dates: Start: 07/24/18       Goal: LTG-By discharge, patient will tolerate standing     Dates: Start: 07/24/18       Description: 1) Individualized goal:  5 min without increased pain  2) Interventions: PT Gait Training, PT Therapeutic  Exercises, PT Neuro Re-Ed/Balance and PT Therapeutic Activity               Goal: LTG-By discharge, patient will ambulate     Dates: Start: 07/24/18       Description: 1) Individualized goal:  With 4WW 500' Lori  2) Interventions: PT Gait Training, PT Therapeutic Exercises, PT Neuro Re-Ed/Balance and PT Therapeutic Activity               Goal: LTG-By discharge, patient will transfer one surface to another     Dates: Start: 07/24/18       Description: 1) Individualized goal:  With 4WW Lori  2) Interventions:  PT Gait Training, PT Therapeutic Exercises, PT Neuro Re-Ed/Balance and PT Therapeutic Activity               Goal: LTG-By discharge, patient will transfer in/out of a car     Dates: Start: 07/24/18       Description: 1) Individualized goal:  With 4WW Lori, maintaining spinal precautions  2) Interventions:  PT Gait Training, PT Therapeutic Exercises, PT Neuro Re-Ed/Balance and PT Therapeutic Activity               Goal: LTG-By discharge, patient will     Dates: Start: 07/24/18       Description: 1) Individualized goal:  Ascend/descend curb with 4WW SPV  2) Interventions:  PT Gait Training, PT Therapeutic Exercises, PT Neuro Re-Ed/Balance and PT Therapeutic Activity                       Section completed by:  Robel Hilton, PT, DPT       Activities of Daily Living  Eating Initial:  7 - Independent  Eating Current:  6 - Modified Independent   Eating Description:  Insert dentures  Grooming Initial:  6 - Modified Independent  Grooming Current:  6 - Modified Independent   Grooming Description:  Increased time  Bathing Initial:  3 - Moderate Assistance  Bathing Current:  5 - Standby Prompting/Supervision or Set-up   Bathing Description:  Grab bar, Tub bench, Long handled bath tool, Hand held shower, Increased time, Supervision for safety (SBA in standing via grab bar for natalee)  Upper Body Dressing Initial:  4 - Minimal Assistance  Upper Body Dressing Current:  5 - Standby Prompting/Supervision or  Set-up   Upper Body Dressing Description:  Increased time, Supervision for safety, Verbal cueing, Set-up of equipment (Mod Indep to don/doff pull over shirt.  Max assist to don LSO)  Lower Body Dressing Initial:  1 - Total Assistance  Lower Body Dressing Current:  5 - Standby Prompting/Supervision or Set-up   Lower Body Dressing Description:  5 - Standby Prompting/Supervision or Set-up  Toileting Initial:  1 - Total Assistance  Toileting Current:  5 - Standby Prompting/Supervision or Set-up   Toileting Description:  Grab bar, Increased time, Supervision for safety  Toilet Transfer Initial:  4 - Minimal Assistance  Toilet Transfer Current:  6 - Modified Independent   Toilet Transfer Description:  6 - Modified Independent  Tub / Shower Transfer Initial:  4 - Minimal Assistance  Tub / Shower Transfer Current:  5 - Standby Prompting/Supervision or Set-up   Tub / Shower Transfer Description:  Grab bar, Increased time, Supervision for safety, Set-up of equipment  IADL:  TBD   Family Training/Education:  TBD   DME/DC Recommendations:  Tub Transfer Bench     Strengths:  Independent PLOF, Making steady progress towards goals, Motivated for self care and independence, Pleasant and cooperative and Willingly participates in therapeutic activities  Barriers:  Decreased endurance, Generalized weakness and Limited mobility     # of short term goals set= 3    # of short term goals met= 1          Occupational Therapy Goals           Problem: Bathing     Dates: Start: 07/24/18         Problem: Functional Transfers     Dates: Start: 07/24/18       Goal: STG-Within one week, patient will transfer to tub/shower     Dates: Start: 08/01/18       Description: 1) Individualized Goal: Mod Indep w/ DME PRN  2) Interventions: OT Self Care/ADL, OT Neuro Re-Ed/Balance, OT Therapeutic Activity, OT Evaluation and OT Therapeutic Exercise       Note:     Goal Note filed on 08/08/18 0942 by Cali Murdock MS,OTR/L    Goal: STG-Within one week, patient  will transfer to tub/shower  Outcome: NOT MET  Pt st Supervision w/ DME                  Problem: OT Long Term Goals     Dates: Start: 07/24/18       Goal: LTG-By discharge, patient will complete basic self care tasks     Dates: Start: 07/24/18       Description: 1) Individualized Goal:  Mod Indep w/ AE/DME PRN  2) Interventions:   OT Self Care/ADL, OT Neuro Re-Ed/Balance, OT Therapeutic Activity, OT Evaluation and OT Therapeutic Exercise             Goal: LTG-By discharge, patient will perform bathroom transfers     Dates: Start: 07/24/18       Description: 1) Individualized Goal: Mod Indep w/ DME PRN  2) Interventions:   OT Self Care/ADL, OT Neuro Re-Ed/Balance, OT Therapeutic Activity, OT Evaluation and OT Therapeutic Exercise               Problem: Toileting     Dates: Start: 07/24/18       Goal: STG-Within one week, patient will complete toileting tasks     Dates: Start: 08/01/18       Description: 1) Individualized Goal:  Mod Indep w/ AE/DME PRN  2) Interventions:  OT Self Care/ADL, OT Neuro Re-Ed/Balance, OT Therapeutic Activity and OT Evaluation       Note:     Goal Note filed on 08/08/18 0946 by Cali Murdock MS,OTR/L    Goal: STG-Within one week, patient will complete toileting tasks  Outcome: NOT MET  Pt at Sup/SBA for toileting                      Section completed by:  Cali Murdock MS,OTR/L             Nutrition       Dietary Problems (Active)            There are no active problems.      Nutrition services: Day 3 of admit.  Sari Ceja is a 53 y.o. female with admitting DX of Other Orthopedic/Lumbar Stenosis with Neurogenic Caludication.     Consult received for supplements.     Pt with PMH of Unspec. Hemorrhagic conditions, renal disorder, HTN, Bowel Habit changes, and Arthritis. Admitted for decreased function after L2-L5 decompression and laminectomies. Reviewed chart. Current diet order Diabetic and PO of % x 3 meals and 50-75% x 1 and sufficient. Note on 7/25 A1C of 5.8 and at DM  "goal of <7.0. RD available and consult as needed.       Assessment:  Height: 165.1 cm (5' 5\")  Weight: 90.7 kg (200 lb)  Body mass index is 33.28 kg/m².  Diet/Intake: Diabetic     Labs, MAR and Chart Reviewed     Diagnosis: No nutritional dx at this time     Recommendations/Plan:  1. Diet as ordered.   2. Encourage intake of 50% or greater  3. Document intake of all meals  as % taken in ADL's to provide interdisciplinary communication across all shifts.   4. Monitor weight.  5. Nutrition rep will continue to see patient for ongoing meal and snack preferences.     Section completed by:  Smitha Jarrett    REHAB-Pharmacy IDT Team Note by Renu Garcia RPH at 8/7/2018 10:42 AM  Version 1 of 1    Author:  Renu Garcai RPH Service:  (none) Author Type:  Pharmacist    Filed:  8/7/2018 10:44 AM Date of Service:  8/7/2018 10:42 AM Status:  Signed    :  Renu Garcia RPH (Pharmacist)         Pharmacy   Pharmacy  Antibiotics/Antifungals/Antivirals:  Medication:      Active Orders     None        Route:         n/a  Stop Date:  n/a  Reason:   Antihypertensives/Cardiac:  Medication:    Orders (72h ago through future)    Start     Ordered    07/25/18 0900  DILTIAZem CD (CARDIZEM CD) capsule 240 mg  DAILY     Comments:  This medication has been substituted for Tiaztia XT per P&T Therapeutic Substitution Protocol.    07/24/18 1225    07/24/18 0900  hydroCHLOROthiazide (HYDRODIURIL) tablet 25 mg  DAILY      07/23/18 1753    07/24/18 0900  lisinopril (PRINIVIL) tablet 20 mg  DAILY      07/23/18 1753    07/23/18 1753  hydrALAZINE (APRESOLINE) tablet 25 mg  EVERY 8 HOURS PRN      07/23/18 1753        Patient Vitals for the past 24 hrs:   BP Pulse   08/07/18 0900 (!) 98/64 64   08/06/18 1840 123/76 83   08/06/18 1300 100/68 86       Anticoagulation:  Medication:  n/a  INR:      Other key medications: gabapentin, omeprazole, pregabalin, tizanidine    Section completed by:  Renu Garcia RPH[[email or web address]]        " Attribution Key     TK.1 - Renu Garcia [facility] on 8/7/2018 10:42 AM                    DC Planning  DC destination/dispostion:  To return to her one story handicap assessable apartment where she lives with her ex- in [place], [place]     Referrals: f/u appointment with pcp, Dr. Pfeiffer and Dr Perez, neurosurgery.  Outpatient therapies in [place]    DC Needs: referral for new 4ww placed.  Has a spc and bs commode at home.      Barriers to discharge:  Lives with ex- who has dementia      Strengths: Supportive sister and boyfriend assist as needed.     Section completed by:  Gabriella Cook R.N.      Physician Summary  Coni Hope MD participated and led team conference discussion.

## 2018-08-08 NOTE — REHAB-OT IDT TEAM NOTE
Occupational Therapy   Activities of Daily Living  Eating Initial:  7 - Independent  Eating Current:  6 - Modified Independent   Eating Description:  Insert dentures  Grooming Initial:  6 - Modified Independent  Grooming Current:  6 - Modified Independent   Grooming Description:  Increased time  Bathing Initial:  3 - Moderate Assistance  Bathing Current:  5 - Standby Prompting/Supervision or Set-up   Bathing Description:  Grab bar, Tub bench, Long handled bath tool, Hand held shower, Increased time, Supervision for safety (SBA in standing via grab bar for natalee)  Upper Body Dressing Initial:  4 - Minimal Assistance  Upper Body Dressing Current:  5 - Standby Prompting/Supervision or Set-up   Upper Body Dressing Description:  Increased time, Supervision for safety, Verbal cueing, Set-up of equipment (Mod Indep to don/doff pull over shirt.  Max assist to don LSO)  Lower Body Dressing Initial:  1 - Total Assistance  Lower Body Dressing Current:  5 - Standby Prompting/Supervision or Set-up   Lower Body Dressing Description:  5 - Standby Prompting/Supervision or Set-up  Toileting Initial:  1 - Total Assistance  Toileting Current:  5 - Standby Prompting/Supervision or Set-up   Toileting Description:  Grab bar, Increased time, Supervision for safety  Toilet Transfer Initial:  4 - Minimal Assistance  Toilet Transfer Current:  6 - Modified Independent   Toilet Transfer Description:  6 - Modified Independent  Tub / Shower Transfer Initial:  4 - Minimal Assistance  Tub / Shower Transfer Current:  5 - Standby Prompting/Supervision or Set-up   Tub / Shower Transfer Description:  Grab bar, Increased time, Supervision for safety, Set-up of equipment  IADL:  TBD   Family Training/Education:  TBD   DME/DC Recommendations:  Tub Transfer Bench     Strengths:  Independent PLOF, Making steady progress towards goals, Motivated for self care and independence, Pleasant and cooperative and Willingly participates in therapeutic  activities  Barriers:  Decreased endurance, Generalized weakness and Limited mobility     # of short term goals set= 3    # of short term goals met= 1          Occupational Therapy Goals           Problem: Bathing     Dates: Start: 07/24/18         Problem: Functional Transfers     Dates: Start: 07/24/18       Goal: STG-Within one week, patient will transfer to tub/shower     Dates: Start: 08/01/18       Description: 1) Individualized Goal: Mod Indep w/ DME PRN  2) Interventions: OT Self Care/ADL, OT Neuro Re-Ed/Balance, OT Therapeutic Activity, OT Evaluation and OT Therapeutic Exercise       Note:     Goal Note filed on 08/08/18 0942 by Cali Murdock MS,OTR/L    Goal: STG-Within one week, patient will transfer to tub/shower  Outcome: NOT MET  Pt st Supervision w/ DME                  Problem: OT Long Term Goals     Dates: Start: 07/24/18       Goal: LTG-By discharge, patient will complete basic self care tasks     Dates: Start: 07/24/18       Description: 1) Individualized Goal:  Mod Indep w/ AE/DME PRN  2) Interventions:   OT Self Care/ADL, OT Neuro Re-Ed/Balance, OT Therapeutic Activity, OT Evaluation and OT Therapeutic Exercise             Goal: LTG-By discharge, patient will perform bathroom transfers     Dates: Start: 07/24/18       Description: 1) Individualized Goal: Mod Indep w/ DME PRN  2) Interventions:   OT Self Care/ADL, OT Neuro Re-Ed/Balance, OT Therapeutic Activity, OT Evaluation and OT Therapeutic Exercise               Problem: Toileting     Dates: Start: 07/24/18       Goal: STG-Within one week, patient will complete toileting tasks     Dates: Start: 08/01/18       Description: 1) Individualized Goal:  Mod Indep w/ AE/DME PRN  2) Interventions:  OT Self Care/ADL, OT Neuro Re-Ed/Balance, OT Therapeutic Activity and OT Evaluation       Note:     Goal Note filed on 08/08/18 0946 by Cali Murdock MS,OTR/L    Goal: STG-Within one week, patient will complete toileting tasks  Outcome: NOT MET  Pt at  Sup/SBA for toileting                      Section completed by:  Cali Murdock, MS,OTR/L

## 2018-08-08 NOTE — PROGRESS NOTES
"Rehab Progress Note     Encounter Date: 8/8/2018    CC: Decreased function after L2-L5 decompression and laminectomies    Interval Events (Subjective)  Patient walking around in therapy gym. She reports she is doing well.  Observed patient transferring in and out of car. She is looking forward to discharge on Friday.  Discussed with therapists in gym and her main issue is donning and doffing her LSO and AFOs with her current spinal precautions. Her pain is also limiting.  Discussed with patient about pain management, she reports she previously had a doctor in [place] but would like to find one closer to home. Discussed that with the law changes that finding a pain specialist is difficult. Reinforced again can only prescribe two weeks at discharge per law. Patient is in agreement. Denies constipation. Denies SOB or cough.     IDT Team Meeting 8/8/2018    Coni CARCAMO M.D., was present and led the interdisciplinary team conference on 8/8/2018.       RN:  Diet Regular   % Meal     Pain    Sleep    Bowel Continent   Bladder Continent   In's & Out's    Incision looking well healed  Systolic BP     PT:  Bed Mobility    Transfers Lori   Mobility Lori   Stairs    Bilateral AFO w FWW and LSO  Chronic pain cycle    OT:  Eating Lori   Grooming Lori   Bathing SBA   UB Dressing SBA   LB Dressing SBA   Toileting    Shower & Transfer    Limited by spinal precautions to don LSO and AFO  Limited by pain management    CM:  Continues to work on disposition and DME needs.      DC/Disposition:  8/10/18 <- decreased due to improvement. Will need medicaid approval    Objective:  VITALS: /65   Pulse 62   Temp 36.3 °C (97.4 °F)   Resp 20   Ht 1.651 m (5' 5\")   Wt 89.5 kg (197 lb 5 oz)   LMP 12/01/2013   SpO2 96%   BMI 32.83 kg/m²   Gen: NAD  Psych: Mood and affect appropriate  CV: RRR, no edema  Resp: CTAB, no upper airway sounds  Abd: NTND  Neuro: AOx4, 5/5 BUE. Able to walk with FWW normal gait.    No results " found for this or any previous visit (from the past 72 hour(s)).    Current Facility-Administered Medications   Medication Frequency   • docusate sodium (COLACE) capsule 200 mg BID   • oxyCODONE immediate release (ROXICODONE) tablet 10 mg Q4HRS PRN   • oxyCODONE immediate-release (ROXICODONE) tablet 15 mg Q4HRS PRN   • gabapentin (NEURONTIN) capsule 900 mg TID   • omeprazole (PRILOSEC) capsule 20 mg DAILY   • senna-docusate (PERICOLACE or SENOKOT S) 8.6-50 MG per tablet 2 Tab BID PRN    And   • polyethylene glycol/lytes (MIRALAX) PACKET 1 Packet QDAY PRN    And   • magnesium hydroxide (MILK OF MAGNESIA) suspension 30 mL QDAY PRN    And   • bisacodyl (DULCOLAX) suppository 10 mg QDAY PRN   • senna-docusate (PERICOLACE or SENOKOT S) 8.6-50 MG per tablet 1 Tab BID   • DILTIAZem CD (CARDIZEM CD) capsule 240 mg DAILY   • Respiratory Care per Protocol Continuous RT   • Pharmacy Consult Request ...Pain Management Review 1 Each PRN   • acetaminophen (TYLENOL) tablet 650 mg Q4HRS PRN   • artificial tears 1.4 % ophthalmic solution 1 Drop PRN   • benzocaine-menthol (CEPACOL) lozenge 1 Lozenge Q2HRS PRN   • hydrALAZINE (APRESOLINE) tablet 25 mg Q8HRS PRN   • mag hydrox-al hydrox-simeth (MAALOX PLUS ES or MYLANTA DS) suspension 20 mL Q2HRS PRN   • ondansetron (ZOFRAN ODT) dispertab 4 mg 4X/DAY PRN    Or   • ondansetron (ZOFRAN) syringe/vial injection 4 mg 4X/DAY PRN   • traZODone (DESYREL) tablet 50 mg QHS PRN   • sodium chloride (OCEAN) 0.65 % nasal spray 2 Spray PRN   • calcium carbonate (TUMS) chewable tab 500 mg BID   • tizanidine (ZANAFLEX) tablet 2 mg TID PRN   • lisinopril (PRINIVIL) tablet 20 mg DAILY   • pregabalin (LYRICA) capsule 300 mg DAILY   • vitamin D (cholecalciferol) tablet 2,000 Units DAILY       Orders Placed This Encounter   Procedures   • Diet Order Regular     Standing Status:   Standing     Number of Occurrences:   1     Order Specific Question:   Diet:     Answer:   Regular [1]       Assessment:  Active  Hospital Problems    Diagnosis   • *Lumbar stenosis with neurogenic claudication   • HTN (hypertension)   • High cholesterol   • Pain   • Bowel habit changes       Medical Decision Making and Plan:  Severe lumbar stenosis w cauda equina - Patient with pre-operative bowel/bladder changes as well as decreased sensation and bilateral foot drop suggestive of cauda equina.  Patient is s/p L2-L5 decompression and posterior fusion with Dr. Perez on 7/18/18.  Since surgery patient has improved with bowel/bladder but continues to have weakness and decreased sensation.  -LSO when OOB with spinal precautions  -PT and OT to evaluate and treat mobility and ADLs.  Patient would benefit from custom articulated ankle-foot orthotics for in home mobility and ADLs.  Patient received custom AFOs, significant improvement with bilateral AFOs    -Follow-up with Dr. Perez' clinic 2 weeks and 6 weeks post-discharge     Neurogenic Bowel/Bladder - Patient with constipation and urinary incontinence prior to surgery. Appears to be improved but will need to monitor.  Will need to check PVRs. Bladder scans 52 and 64 after voiding. Can discontinue as improving sensation and minimal residuals.  -Continue scheduled Senna-Docusate.  Discussed continued use on high dose opiates to avoid constipation. Increased docusate to 200 mg BID     Pain - Patient with both neuropathic and post-operative pain.  Discussed need to reduce opiate use.  Currently on Gabapentin 600 mg TID and Lyrica 300 mg daily.  Oxycodone and Tylenol PRN.    -Utilizing significant Oxycodone dosage, discussed decreased reliance. Increase Gabapentin to 900 mg TID. Will reduce Oxycodone to Q4H.     I answered the patient's questions regarding this treatment, and the patient indicated understanding and willingness to proceed.    HTN/CV - Patient on HCTZ 25 mg daily, Lisinopril 20 mg daily, Diltiazem  mg daily. SBP remains low and patient ambulating more. Will discontinue HCTZ and  monitor. Decrease Lisinopril to 10 mg daily     Vitamin D - On supplementation, admission labs within normal limits. Continue 2000 daily     GI Ppx - Patient on prilosec      DVT Ppx - Patient ambulating + 300 feet on 8/3/18. Will stop Lovenox     Total time:  40 minutes.  I spent greater than 50% of the time for patient care, counseling, and coordination on this date, including unit/floor time, and face-to-face time with the patient as per interval events and assessment and plan above. Topics discussed included opiate discussion, discharge planning and decrease lisinopril due to hypotension. Patient was discussed separately in IDT today; please see details above.      Coni Hope M.D.

## 2018-08-09 PROCEDURE — 97110 THERAPEUTIC EXERCISES: CPT

## 2018-08-09 PROCEDURE — 97535 SELF CARE MNGMENT TRAINING: CPT

## 2018-08-09 PROCEDURE — 700102 HCHG RX REV CODE 250 W/ 637 OVERRIDE(OP): Performed by: PHYSICAL MEDICINE & REHABILITATION

## 2018-08-09 PROCEDURE — 97116 GAIT TRAINING THERAPY: CPT

## 2018-08-09 PROCEDURE — 97530 THERAPEUTIC ACTIVITIES: CPT

## 2018-08-09 PROCEDURE — 770010 HCHG ROOM/CARE - REHAB SEMI PRIVAT*

## 2018-08-09 PROCEDURE — 99232 SBSQ HOSP IP/OBS MODERATE 35: CPT | Performed by: PHYSICAL MEDICINE & REHABILITATION

## 2018-08-09 PROCEDURE — 700112 HCHG RX REV CODE 229: Performed by: PHYSICAL MEDICINE & REHABILITATION

## 2018-08-09 PROCEDURE — A9270 NON-COVERED ITEM OR SERVICE: HCPCS | Performed by: PHYSICAL MEDICINE & REHABILITATION

## 2018-08-09 RX ORDER — OXYCODONE HYDROCHLORIDE 10 MG/1
10 TABLET ORAL EVERY 4 HOURS PRN
Qty: 64 TAB | Refills: 0 | Status: SHIPPED | OUTPATIENT
Start: 2018-08-09 | End: 2018-08-23

## 2018-08-09 RX ORDER — GABAPENTIN 600 MG/1
600 TABLET ORAL 3 TIMES DAILY
Qty: 90 TAB | Refills: 2 | Status: SHIPPED | OUTPATIENT
Start: 2018-08-09

## 2018-08-09 RX ORDER — PSEUDOEPHEDRINE HCL 30 MG
200 TABLET ORAL 2 TIMES DAILY
Qty: 60 CAP | Refills: 2 | Status: SHIPPED | OUTPATIENT
Start: 2018-08-09

## 2018-08-09 RX ORDER — DILTIAZEM HYDROCHLORIDE 240 MG/1
240 CAPSULE, COATED, EXTENDED RELEASE ORAL DAILY
Qty: 30 CAP | Refills: 2 | Status: SHIPPED | OUTPATIENT
Start: 2018-08-10

## 2018-08-09 RX ADMIN — LISINOPRIL 10 MG: 5 TABLET ORAL at 10:31

## 2018-08-09 RX ADMIN — OMEPRAZOLE 20 MG: 20 CAPSULE, DELAYED RELEASE ORAL at 08:27

## 2018-08-09 RX ADMIN — GABAPENTIN 900 MG: 300 CAPSULE ORAL at 15:27

## 2018-08-09 RX ADMIN — CALCIUM CARBONATE (ANTACID) CHEW TAB 500 MG 500 MG: 500 CHEW TAB at 08:25

## 2018-08-09 RX ADMIN — DILTIAZEM HYDROCHLORIDE 240 MG: 120 CAPSULE, COATED, EXTENDED RELEASE ORAL at 10:32

## 2018-08-09 RX ADMIN — OXYCODONE HYDROCHLORIDE 10 MG: 10 TABLET ORAL at 15:37

## 2018-08-09 RX ADMIN — GABAPENTIN 900 MG: 300 CAPSULE ORAL at 08:26

## 2018-08-09 RX ADMIN — OXYCODONE HYDROCHLORIDE 10 MG: 10 TABLET ORAL at 11:39

## 2018-08-09 RX ADMIN — SENNOSIDES AND DOCUSATE SODIUM 1 TABLET: 8.6; 5 TABLET ORAL at 08:27

## 2018-08-09 RX ADMIN — VITAMIN D, TAB 1000IU (100/BT) 2000 UNITS: 25 TAB at 08:27

## 2018-08-09 RX ADMIN — TIZANIDINE 2 MG: 4 TABLET ORAL at 20:00

## 2018-08-09 RX ADMIN — SENNOSIDES AND DOCUSATE SODIUM 1 TABLET: 8.6; 5 TABLET ORAL at 20:00

## 2018-08-09 RX ADMIN — DOCUSATE SODIUM 200 MG: 100 CAPSULE, LIQUID FILLED ORAL at 08:26

## 2018-08-09 RX ADMIN — OXYCODONE HYDROCHLORIDE 10 MG: 10 TABLET ORAL at 02:07

## 2018-08-09 RX ADMIN — OXYCODONE HYDROCHLORIDE 10 MG: 10 TABLET ORAL at 06:02

## 2018-08-09 RX ADMIN — CALCIUM CARBONATE (ANTACID) CHEW TAB 500 MG 500 MG: 500 CHEW TAB at 19:59

## 2018-08-09 RX ADMIN — PREGABALIN 300 MG: 100 CAPSULE ORAL at 08:27

## 2018-08-09 RX ADMIN — OXYCODONE HYDROCHLORIDE 10 MG: 10 TABLET ORAL at 20:03

## 2018-08-09 RX ADMIN — GABAPENTIN 900 MG: 300 CAPSULE ORAL at 20:00

## 2018-08-09 ASSESSMENT — PATIENT HEALTH QUESTIONNAIRE - PHQ9
2. FEELING DOWN, DEPRESSED, IRRITABLE, OR HOPELESS: NOT AT ALL
2. FEELING DOWN, DEPRESSED, IRRITABLE, OR HOPELESS: NOT AT ALL
SUM OF ALL RESPONSES TO PHQ9 QUESTIONS 1 AND 2: 0
SUM OF ALL RESPONSES TO PHQ9 QUESTIONS 1 AND 2: 0
1. LITTLE INTEREST OR PLEASURE IN DOING THINGS: NOT AT ALL
1. LITTLE INTEREST OR PLEASURE IN DOING THINGS: NOT AT ALL

## 2018-08-09 ASSESSMENT — PAIN SCALES - GENERAL
PAINLEVEL_OUTOF10: 7
PAINLEVEL_OUTOF10: 6
PAINLEVEL_OUTOF10: 4
PAINLEVEL_OUTOF10: 5
PAINLEVEL_OUTOF10: 5
PAINLEVEL_OUTOF10: 1
PAINLEVEL_OUTOF10: 4
PAINLEVEL_OUTOF10: 6

## 2018-08-09 NOTE — CARE PLAN
Problem: Safety  Goal: Will remain free from injury  Pt a/o x 4. Took scheduled meds whole with thin liquids, no coughing observed, pt wears boots for foot drup; SR up x 2, non skid socks/shoes when OOB, hourly rounding maintained    Patient is now mod I in the room with a FWW    Problem: Infection  Goal: Will remain free from infection  Pt's lower back incision healing well, no s/s of infection observed, area kept dry and clean, will continue to monitor.    Problem: Bowel/Gastric:  Goal: Normal bowel function is maintained or improved  Pt stated her last bowel movement was yesterday 8/8-- scheduled stool softeners administered. Patient continent of both bowel and bladder. Will continue to monitor.

## 2018-08-09 NOTE — CARE PLAN
"Problem: Safety  Goal: Will remain free from injury  Outcome: PROGRESSING AS EXPECTED  Patient is free from fall and injury this shift. Patient reported, \"my blood pressure is always low in the morning.\" Denies of dizziness or lightheadedness this shift. Educated pt on increased oral fluid intake, rise slowly, and dangle prior to standing, and lie down in bed immediately if feeling dizzy or lightheaded. Non skid footwear on. Bed is locked and at lowest position. Call light and personal belongings within reach. Pt uses call light appropriately, and waits for assistance this shift.      Problem: Pain Management  Goal: Pain level will decrease to patient's comfort goal  Outcome: PROGRESSING AS EXPECTED  Patient able to verbalize pain level and verbalize an acceptable level of pain. Pain decreases from 6/10 to 4/10 after administer oxycodone 10mg per MAR. Notified pt available pain meds per MAR. Pt verbalized increased comfort level and resting in bed. Will continue to monitor.       "

## 2018-08-09 NOTE — PROGRESS NOTES
Received shift report and assumed care of patient at 07/15.  Patient awake, calm and stable, currently positioned in bed for comfort and safety; call light within reach.  Denies pain or discomfort at this time.  Will continue to monitor.

## 2018-08-09 NOTE — PROGRESS NOTES
"Rehab Progress Note     Encounter Date: 8/9/2018    CC: Decreased function after L2-L5 decompression and laminectomies    Interval Events (Subjective)  Patient seen in room after being outside watering plants. She reports she is looking forward to going home. She reports that she thinks she is a lot more functional after her operation. She has questions about opiates again. Discussed that could provide 2 week supply but will have to follow-up with her normal pain doctor. She reports otherwise that she is doing well. She reports her blood pressure was low this morning but improved now. Discussed will review her medications again for blood pressure adjustments.     IDT Team Meeting 8/8/2018  Discharge 8/10/18    Objective:  VITALS: /88 Comment: manual  Pulse 78   Temp 36.6 °C (97.8 °F)   Resp 18   Ht 1.651 m (5' 5\")   Wt 89.5 kg (197 lb 5 oz)   LMP 12/01/2013   SpO2 99%   BMI 32.83 kg/m²   Gen: NAD  Psych: Mood and affect appropriate  CV: RRR, no edema  Resp: CTAB, no upper airway sounds  Abd: NTND  Neuro: AOx4, 5/5 BUE. Able to walk with FWW normal gait.  Unchanged from 8/8/18    No results found for this or any previous visit (from the past 72 hour(s)).    Current Facility-Administered Medications   Medication Frequency   • lisinopril (PRINIVIL) tablet 10 mg DAILY   • docusate sodium (COLACE) capsule 200 mg BID   • oxyCODONE immediate release (ROXICODONE) tablet 10 mg Q4HRS PRN   • oxyCODONE immediate-release (ROXICODONE) tablet 15 mg Q4HRS PRN   • gabapentin (NEURONTIN) capsule 900 mg TID   • omeprazole (PRILOSEC) capsule 20 mg DAILY   • senna-docusate (PERICOLACE or SENOKOT S) 8.6-50 MG per tablet 2 Tab BID PRN    And   • polyethylene glycol/lytes (MIRALAX) PACKET 1 Packet QDAY PRN    And   • magnesium hydroxide (MILK OF MAGNESIA) suspension 30 mL QDAY PRN    And   • bisacodyl (DULCOLAX) suppository 10 mg QDAY PRN   • senna-docusate (PERICOLACE or SENOKOT S) 8.6-50 MG per tablet 1 Tab BID   • " DILTIAZem CD (CARDIZEM CD) capsule 240 mg DAILY   • Respiratory Care per Protocol Continuous RT   • Pharmacy Consult Request ...Pain Management Review 1 Each PRN   • acetaminophen (TYLENOL) tablet 650 mg Q4HRS PRN   • artificial tears 1.4 % ophthalmic solution 1 Drop PRN   • benzocaine-menthol (CEPACOL) lozenge 1 Lozenge Q2HRS PRN   • hydrALAZINE (APRESOLINE) tablet 25 mg Q8HRS PRN   • mag hydrox-al hydrox-simeth (MAALOX PLUS ES or MYLANTA DS) suspension 20 mL Q2HRS PRN   • ondansetron (ZOFRAN ODT) dispertab 4 mg 4X/DAY PRN    Or   • ondansetron (ZOFRAN) syringe/vial injection 4 mg 4X/DAY PRN   • traZODone (DESYREL) tablet 50 mg QHS PRN   • sodium chloride (OCEAN) 0.65 % nasal spray 2 Spray PRN   • calcium carbonate (TUMS) chewable tab 500 mg BID   • tizanidine (ZANAFLEX) tablet 2 mg TID PRN   • pregabalin (LYRICA) capsule 300 mg DAILY   • vitamin D (cholecalciferol) tablet 2,000 Units DAILY       Orders Placed This Encounter   Procedures   • Diet Order Regular     Standing Status:   Standing     Number of Occurrences:   1     Order Specific Question:   Diet:     Answer:   Regular [1]       Assessment:  Active Hospital Problems    Diagnosis   • *Lumbar stenosis with neurogenic claudication   • HTN (hypertension)   • High cholesterol   • Pain   • Bowel habit changes       Medical Decision Making and Plan:  Severe lumbar stenosis w cauda equina - Patient with pre-operative bowel/bladder changes as well as decreased sensation and bilateral foot drop suggestive of cauda equina.  Patient is s/p L2-L5 decompression and posterior fusion with Dr. Perez on 7/18/18.  Since surgery patient has improved with bowel/bladder but continues to have weakness and decreased sensation.  -LSO when OOB with spinal precautions  -PT and OT to evaluate and treat mobility and ADLs.  Patient would benefit from custom articulated ankle-foot orthotics for in home mobility and ADLs.  Patient received custom AFOs, significant improvement with  bilateral AFOs    -Follow-up with Dr. Perez' clinic 2 weeks and 6 weeks post-discharge     Neurogenic Bowel/Bladder - Patient with constipation and urinary incontinence prior to surgery. Appears to be improved but will need to monitor.  Will need to check PVRs. Bladder scans 52 and 64 after voiding. Can discontinue as improving sensation and minimal residuals.  -Continue scheduled Senna-Docusate.  Discussed continued use on high dose opiates to avoid constipation. Increased docusate to 200 mg BID     Pain - Patient with both neuropathic and post-operative pain.  Discussed need to reduce opiate use.  Currently on Gabapentin 600 mg TID and Lyrica 300 mg daily.  Oxycodone and Tylenol PRN.    -Utilizing significant Oxycodone dosage, discussed decreased reliance. Increase Gabapentin to 900 mg TID. Will reduce Oxycodone to Q4H.  Opiate counseling on 8/9/18  I discussed the risks and benefits of using opiate medications for pain control.  I discussed the risk of addiction, potential for overdose, and respiratory depression (and the potential need for opiate antagonist therapy if this occurs).  I encouraged the patient to take this medication sparingly with the expressed goal of weaning off the medication as soon as is clinically appropriate.  I informed the patient that we are only able to provide a 14 day supply of these medications at discharge and that they will be responsible for requesting any refills needed from their primary care provider or their surgeon.  We discussed the need to safely secure these medications to prevent theft, inadvertent ingestion, or misuse.  Any unused medication should be immediately disposed of through a sanctioned medication disposal program.  We discussed adjunctive pain medications and conservative therapies at length.    I answered the patient's questions regarding this treatment, and the patient indicated understanding and willingness to proceed.    HTN/CV - Patient on HCTZ 25 mg  daily, Lisinopril 20 mg daily, Diltiazem  mg daily. SBP remains low and patient ambulating more. Will discontinue HCTZ and monitor. Discontinued Lisinopril.      Vitamin D - On supplementation, admission labs within normal limits. Continue 2000 daily     GI Ppx - Patient on prilosec      DVT Ppx - Patient ambulating + 300 feet on 8/3/18. Will stop Lovenox     Total time:  30 minutes.  I spent greater than 50% of the time for patient care, counseling, and coordination on this date, including unit/floor time, and face-to-face time with the patient as per interval events and assessment and plan above. Topics discussed included opiate counseling and blood pressure medication discontinued.     Coni Hope M.D.

## 2018-08-10 VITALS
TEMPERATURE: 97.6 F | HEART RATE: 72 BPM | WEIGHT: 197.31 LBS | OXYGEN SATURATION: 95 % | BODY MASS INDEX: 32.87 KG/M2 | DIASTOLIC BLOOD PRESSURE: 76 MMHG | SYSTOLIC BLOOD PRESSURE: 112 MMHG | RESPIRATION RATE: 18 BRPM | HEIGHT: 65 IN

## 2018-08-10 PROCEDURE — 700112 HCHG RX REV CODE 229: Performed by: PHYSICAL MEDICINE & REHABILITATION

## 2018-08-10 PROCEDURE — A9270 NON-COVERED ITEM OR SERVICE: HCPCS | Performed by: PHYSICAL MEDICINE & REHABILITATION

## 2018-08-10 PROCEDURE — 99239 HOSP IP/OBS DSCHRG MGMT >30: CPT | Performed by: PHYSICAL MEDICINE & REHABILITATION

## 2018-08-10 PROCEDURE — 700102 HCHG RX REV CODE 250 W/ 637 OVERRIDE(OP): Performed by: PHYSICAL MEDICINE & REHABILITATION

## 2018-08-10 RX ADMIN — PREGABALIN 300 MG: 100 CAPSULE ORAL at 08:29

## 2018-08-10 RX ADMIN — OXYCODONE HYDROCHLORIDE 15 MG: 5 TABLET ORAL at 02:16

## 2018-08-10 RX ADMIN — CALCIUM CARBONATE (ANTACID) CHEW TAB 500 MG 500 MG: 500 CHEW TAB at 08:33

## 2018-08-10 RX ADMIN — OXYCODONE HYDROCHLORIDE 10 MG: 10 TABLET ORAL at 10:42

## 2018-08-10 RX ADMIN — OMEPRAZOLE 20 MG: 20 CAPSULE, DELAYED RELEASE ORAL at 08:33

## 2018-08-10 RX ADMIN — DILTIAZEM HYDROCHLORIDE 240 MG: 120 CAPSULE, COATED, EXTENDED RELEASE ORAL at 08:30

## 2018-08-10 RX ADMIN — OXYCODONE HYDROCHLORIDE 10 MG: 10 TABLET ORAL at 06:24

## 2018-08-10 RX ADMIN — GABAPENTIN 900 MG: 300 CAPSULE ORAL at 08:30

## 2018-08-10 RX ADMIN — VITAMIN D, TAB 1000IU (100/BT) 2000 UNITS: 25 TAB at 08:33

## 2018-08-10 RX ADMIN — SENNOSIDES AND DOCUSATE SODIUM 1 TABLET: 8.6; 5 TABLET ORAL at 08:32

## 2018-08-10 RX ADMIN — DOCUSATE SODIUM 100 MG: 100 CAPSULE, LIQUID FILLED ORAL at 08:30

## 2018-08-10 ASSESSMENT — PAIN SCALES - GENERAL
PAINLEVEL_OUTOF10: 5
PAINLEVEL_OUTOF10: 2
PAINLEVEL_OUTOF10: 6
PAINLEVEL_OUTOF10: 7

## 2018-08-10 ASSESSMENT — ACTIVITIES OF DAILY LIVING (ADL)
TOILET_TRANSFER_LEVEL_OF_ASSIST: ABLE TO COMPLETE TOILET TRANSFER WITHOUT ASSIST
SHOWER_TRANSFER_LEVEL_OF_ASSIST: ABLE TO COMPLETE SHOWER TRANSFER WITHOUT ASSIST
TOILETING_LEVEL_OF_ASSIST: ABLE TO COMPLETE TOILETING WITHOUT ASSIST

## 2018-08-10 NOTE — CARE PLAN
Problem: OT Long Term Goals  Goal: LTG-By discharge, patient will complete basic self care tasks  1) Individualized Goal:  Mod Indep w/ AE/DME PRN  2) Interventions:   OT Self Care/ADL, OT Neuro Re-Ed/Balance, OT Therapeutic Activity, OT Evaluation and OT Therapeutic Exercise     Outcome: NOT MET  Pt at Supervision for bathing and required assistance to manage LSO and bilateral AFO's.  Goal: LTG-By discharge, patient will perform bathroom transfers  1) Individualized Goal: Mod Indep w/ DME PRN  2) Interventions:   OT Self Care/ADL, OT Neuro Re-Ed/Balance, OT Therapeutic Activity, OT Evaluation and OT Therapeutic Exercise     Outcome: MET Date Met: 08/10/18  Pt at Mod Indep for bathroom transfers w/DME

## 2018-08-10 NOTE — PROGRESS NOTES
Patient discharged to home per order.  Discharge instructions reviewed with patient and SO; they verbalize understanding and signed copies placed in chart.  Patient has all belongings; signed copy of form in chart.  Patient left facility at 1130 via w/c accompanied by rehab staff and SO.  Have enjoyed working with this pleasant patient.

## 2018-08-10 NOTE — CARE PLAN
Problem: Balance  Goal: STG-Within one week, patient will maintain static standing  1) Individualized goal:  Unsupported for 1 min with SPV  2) Interventions:  PT Gait Training, PT Therapeutic Exercises, PT Neuro Re-Ed/Balance and PT Therapeutic Activity     Outcome: DISCHARGED-GOAL NOT MET Date Met: 08/10/18      Problem: Mobility  Goal: STG-Within one week, patient will ambulate community distances  1) Individualized goal: With 4WW 500' Lori  2) Interventions: PT Gait Training, PT Therapeutic Exercises, PT Neuro Re-Ed/Balance and PT Therapeutic Activity        Outcome: MET Date Met: 08/10/18    Goal: STG-Within one week, patient will ambulate up/down a curb  1) Individualized goal: Ascend/descend curb with 4WW SPV  2) Interventions: PT Gait Training, PT Therapeutic Exercises, PT Neuro Re-Ed/Balance and PT Therapeutic Activity        Outcome: MET Date Met: 08/10/18      Problem: Mobility Transfers  Goal: STG-Within one week, patient will transfer bed to chair  1) Individualized goal: With 4WW Lori, maintaining spinal precautions  2) Interventions: PT Gait Training, PT Therapeutic Exercises, PT Neuro Re-Ed/Balance and PT Therapeutic Activity          Outcome: MET Date Met: 08/10/18    Goal: STG-Within one week, patient will transfer in/out of car  1) Individualized goal: With 4WW Lori, maintaining spinal precautions  2) Interventions: PT Gait Training, PT Therapeutic Exercises, PT Neuro Re-Ed/Balance and PT Therapeutic Activity        Outcome: MET Date Met: 08/10/18      Problem: PT-Long Term Goals  Goal: LTG-By discharge, patient will tolerate standing  1) Individualized goal:  5 min without increased pain  2) Interventions: PT Gait Training, PT Therapeutic Exercises, PT Neuro Re-Ed/Balance and PT Therapeutic Activity       Outcome: MET Date Met: 08/10/18    Goal: LTG-By discharge, patient will ambulate  1) Individualized goal:  With 4WW 500' Lori  2) Interventions: PT Gait Training, PT Therapeutic Exercises, PT  Neuro Re-Ed/Balance and PT Therapeutic Activity       Outcome: MET Date Met: 08/10/18    Goal: LTG-By discharge, patient will transfer one surface to another  1) Individualized goal:  With 4WW Lori  2) Interventions:  PT Gait Training, PT Therapeutic Exercises, PT Neuro Re-Ed/Balance and PT Therapeutic Activity       Outcome: MET Date Met: 08/10/18    Goal: LTG-By discharge, patient will transfer in/out of a car  1) Individualized goal:  With 4WW Lori, maintaining spinal precautions  2) Interventions:  PT Gait Training, PT Therapeutic Exercises, PT Neuro Re-Ed/Balance and PT Therapeutic Activity       Outcome: MET Date Met: 08/10/18    Goal: LTG-By discharge, patient will  1) Individualized goal:  Ascend/descend curb with 4WW SPV  2) Interventions:  PT Gait Training, PT Therapeutic Exercises, PT Neuro Re-Ed/Balance and PT Therapeutic Activity       Outcome: MET Date Met: 08/10/18

## 2018-08-10 NOTE — CARE PLAN
Problem: Infection  Goal: Will remain free from infection  Outcome: PROGRESSING AS EXPECTED  Patient remains free from s/s infection; afebrile.  Will continue to monitor.    Problem: Bowel/Gastric:  Goal: Normal bowel function is maintained or improved  Outcome: PROGRESSING AS EXPECTED  Pts last BM was 8/8/2018; pt refused one of her scheduled bowel meds, prn bowel meds available if needed; bowel sounds heard in all 4 qaudrants, pt denies pain/discomfort; will continue to monitor

## 2018-08-10 NOTE — DISCHARGE PLANNING
08/08/18  0843   Discharge Instructions - Completed by Case Mgmt   Discharge Location Home with Outpatient Services     Agency Name / Address / Phone [facility] [address] [phone]     Home Health Physical Therapist     DME Provider / Phone [facility] [phone]     Medical Equipment Ordered Four Wheeled Walker           Follow-Up       Follow-up With  Details  Why  Contact Info   Mayda Pfeiffer M.D.  Primary Care        On 8/14/2018 Tuesday @ 11:00 am ( we will fax records)  [address]  [place] [address]  [phone]   Robel Perez M.D.  Neurosurgery  On 8/29/2018 Wednesday @ 10:45am  [address] [place] [address]  [phone]

## 2018-08-10 NOTE — DISCHARGE SUMMARY
"Rehab Discharge Summary    Admission Date: 7/23/2018    Discharge Date: 8/10/2018    Attending Provider: Coni Hope MD/PhD    Admission Diagnosis:   Active Hospital Problems    Diagnosis   • *Lumbar stenosis with neurogenic claudication   • HTN (hypertension)   • High cholesterol   • Pain   • Bowel habit changes       Discharge Diagnosis:  Active Hospital Problems    Diagnosis   • *Lumbar stenosis with neurogenic claudication   • HTN (hypertension)   • High cholesterol   • Pain   • Bowel habit changes       HPI per H&P:  Per PM&R Consult note by Dr. Hunt on 7/19/18:  \"The patient is a 53 y.o. female with a past medical history of hypertension, lumbar spinal stenosis, admitted on 7/17/2018  5:36 AM for an elective lumbar surgery.     Per review of the medical record and confirmation with the patient, she had critical  Lumbar stenosis with severe L2-5 sensory and motor radiculopathy for which she was admitted for a L2-5 decompression and posterolateral arthrodesis with Dr. Perez on 7/18. She has used 90 OME in last 12 hours.      The patient currently reports she was followed for many years by Dr. Mendoza for pain. She started having pain and numbness in her right leg this February, had a \"back injection\" that helped, then had symptoms on the left leg in March, had an epidural which did not help and made her pain worse. After the epidural she then had weakness in her ankles bilaterally. She then developed worsened pain, as well as saddle anesthesia, for months prior to her getting a referral to neurosurgery. She even saw a podiatrist for the numbness in her feet, and she got an ankle brace. She states she had bladder incontinence prior to her surgery because she couldn't feel when her bladder was full. She still reports numbness in her legs/feet/saddle area, but there is no more burning pain. Still has severe weakness at her ankles. She hasn't moved her bowels since surgery. 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  Patient transferred on 7/23/18 without incident.  Patient reports she has had BMs but cannot feel them completely. She reports she can feel when she needs to void but that she cannot get to bathroom quickly enough. She reports patch sensation in bilateral feet. She reports slowly improving strength. She reports no steps into home and she takes care of  who has dementia and alcoholic (recently fallen off the wagon since her illness).     Hospital Course by Problem List:  Severe lumbar stenosis w cauda equina - Patient with pre-operative bowel/bladder changes as well as decreased sensation and bilateral foot drop suggestive of cauda equina.  Patient is s/p L2-L5 decompression and posterior fusion with Dr. Perez on 7/18/18.  Since surgery patient has improved with bowel/bladder but continues to have weakness and decreased sensation.  Patient underwent acute inpatient rehabilitation from 7/23/18 to 8/10/18 with good improvement in mobility and ADLs.  Patient would benefit from custom articulated ankle-foot orthotics for in home mobility and ADLs.  Patient received custom AFOs, significant improvement with bilateral AFOs  -LSO when OOB with spinal precautions    -Follow-up with Dr. Perez' clinic 2 weeks and 6 weeks post-discharge     Neurogenic Bowel/Bladder - Patient with constipation and urinary incontinence prior to surgery. Appears to be improved but will need to monitor.  Will need to check PVRs. Bladder scans 52 and 64 after voiding. Can discontinue as improving sensation and minimal residuals.  Neurogenic bladder improved.   -Continue scheduled Senna-Docusate.  Discussed continued use on high dose opiates to avoid constipation. Increased docusate to 200 mg BID     Pain - Patient with both neuropathic and " post-operative pain.  Discussed need to reduce opiate use.  Currently on Gabapentin 600 mg TID and Lyrica 300 mg daily.  Oxycodone and Tylenol PRN.    -Utilizing significant Oxycodone dosage, discussed decreased reliance. Increase Gabapentin to 900 mg TID. Will reduce Oxycodone to Q4H.  Opiate counseling on 18  I discussed the risks and benefits of using opiate medications for pain control.  I discussed the risk of addiction, potential for overdose, and respiratory depression (and the potential need for opiate antagonist therapy if this occurs).  I encouraged the patient to take this medication sparingly with the expressed goal of weaning off the medication as soon as is clinically appropriate.  I informed the patient that we are only able to provide a 14 day supply of these medications at discharge and that they will be responsible for requesting any refills needed from their primary care provider or their surgeon.  We discussed the need to safely secure these medications to prevent theft, inadvertent ingestion, or misuse.  Any unused medication should be immediately disposed of through a sanctioned medication disposal program.  We discussed adjunctive pain medications and conservative therapies at length.    I answered the patient's questions regarding this treatment, and the patient indicated understanding and willingness to proceed.     HTN/CV - Patient on HCTZ 25 mg daily, Lisinopril 20 mg daily, Diltiazem  mg daily. SBP remains low and patient ambulating more. Will discontinue HCTZ and monitor. Discontinued Lisinopril.   Discharge on Home Diltiazem 240 mg daily     Vitamin D - On supplementation, admission labs within normal limits. Continue 2000 daily     GI Ppx - Patient on prilosec      Functional Status at Discharge  Eatin - Modified Independent  Eating Description:  Increased time  Groomin - Modified Independent  Grooming Description:  Increased time  Bathin - Standby  Prompting/Supervision or Set-up  Bathing Description:  Grab bar, Tub bench, Long handled bath tool, Hand held shower, Increased time, Supervision for safety, Initial preparation for task  Upper Body Dressin - Standby Prompting/Supervision or Set-up  Upper Body Dressing Description:  Increased time, Application of orthotic or brace (Mod Indep to manage UB clothing, max assist to don LSO)  Lower Body Dressin - Standby Prompting/Supervision or Set-up  Lower Body Dressing Description:  5 - Standby Prompting/Supervision or Set-up     Walk:  6 - Modified Independent  Distance Walked:  Walks a minimum of 150 feet  Walk Description:  Walker  Wheelchair:  6 - Modified Independent  Distance Propelled:  Propels a minimum of 150 feet   Wheelchair Description:  Extra time  Stairs 5 - Standby Prompting/Supervision or Set-up  Stairs DescriptionAscends/descends 12 to 14 steps, Extra time, Hand rails, Supervision for safety, Verbal cueing  Discharge Location: Home  Patient Discharging with Assist of: Spouse / Significant Other  Level of Supervision Required Upon Discharge: No Supervision  Recommended Equipment for Discharge: 4-Wheeled Walker  Recommeded Services Upon Discharge: Outpatient Physical Therapy  Long Term Goals Met: 5  Long Term Goals Not Met: 0  Reason(s) for Goals Not Met: n/a  Criteria for Termination of Services: Maximum Function Achieved for Inpatient Rehabilitation  Comprehension Mode:  Both  Comprehension:  6 - Modified Independent  Comprehension Description:  Glasses  Expression Mode:  Both  Expression:  6 - Modified Independent  Expression Description:     Social Interaction:  7 - Independent  Social Interaction Description:  Increased time  Problem Solvin - Modified Independent  Problem Solving Description:  Increased time  Memory:  6 - Modified Independent  Memory Description:  Therapy schedule       Discharge Medication:     Medication List      START taking these medications      Instructions    DILTIAZem  MG Cp24  Commonly known as:  CARDIZEM CD  Notes to patient:  Controls blood pressure   Take 1 Cap by mouth every day.  Dose:  240 mg        CHANGE how you take these medications      Instructions   docusate sodium 100 MG Caps  What changed:  how much to take  Notes to patient:  Stool softener. Hold for loose stools   Take 200 mg by mouth 2 Times a Day.  Dose:  200 mg     oxyCODONE immediate release 10 MG immediate release tablet  What changed:  how much to take  Commonly known as:  ROXICODONE  Notes to patient:  For pain. Take every 4 hours as needed   Take 1 Tab by mouth every four hours as needed for up to 14 days.  Dose:  10 mg        CONTINUE taking these medications      Instructions   calcium carbonate 500 MG Chew  Commonly known as:  TUMS  Notes to patient:  Calcium supplement and decrease indigestion   Take 1 Tab by mouth 2 Times a Day.  Dose:  500 mg     Cholecalciferol 2000 UNIT Tabs  Notes to patient:  Vitamin D for bone health   Take 1 Tab by mouth every day.  Dose:  1 Tab     gabapentin 600 MG tablet  Commonly known as:  NEURONTIN  Notes to patient:  For nerve pain   Take 1 Tab by mouth 3 times a day.  Dose:  600 mg     LYRICA 150 MG Caps  Generic drug:  pregabalin  Notes to patient:  For severe nerve pain. Take once daily as needed   Take 2 Caps by mouth 1 time daily as needed for up to 30 days.  Dose:  300 mg        STOP taking these medications    bisacodyl 10 MG Supp  Commonly known as:  DULCOLAX     diphenhydrAMINE 25 MG Tabs  Commonly known as:  BENADRYL     enoxaparin 40 MG/0.4ML Soln inj  Commonly known as:  LOVENOX     fleet 7-19 GM/118ML Enem     hydroCHLOROthiazide 25 MG Tabs  Commonly known as:  HYDRODIURIL     lisinopril 20 MG Tabs  Commonly known as:  PRINIVIL     magnesium hydroxide 400 MG/5ML Susp  Commonly known as:  MILK OF MAGNESIA     ondansetron 4 MG Tbdp  Commonly known as:  ZOFRAN ODT     ONE-A-DAY WOMENS 50+ ADVANTAGE PO     TAZTIA  MG SR capsule  Generic  drug:  diltiazem     TEARS NATURALE II Soln     tizanidine 2 MG tablet  Commonly known as:  ZANAFLEX            Discharge Diet:  Regular    Discharge Activity:  As tolerated LSO when OOB     Disposition:  Patient to discharge home with family support and community resources.     Equipment:  FWW, bilateral AFOs    Follow-up & Discharge Instructions:  Follow up with your primary care provider (PCP) within 7-10 days of discharge to review your medications and take over your care.     If you develop chest pain, fever, chills, change in neurologic function (weakness, sensation changes, vision changes), or other concerning sxs, seek immediate medical attention or call 911.      Condition on Discharge:  Good    More than 40 minutes was spent on discharging this patient, including face-to-face time, prescription management, and the dictation of this note.    Coni Hope M.D.    Date of Service: 8/10/2018

## 2018-08-10 NOTE — DISCHARGE PLANNING
Case Management  Met with patient and boyfriendAntonia to go over discharge appointments and dme phone numbers.  Questions answered.

## 2018-08-10 NOTE — DISCHARGE INSTRUCTIONS
Occupational Therapy Discharge Instructions for Sari Ceja    8/10/2018    Level of Assist Required for Eating: Able to Complete Eating without Assist  Level of Assist Required for Grooming: Able to Complete Grooming without Assist  Level of Assist Required for Dressing: Requires Supervision with Dressing  Equipment for Dressing: Sock Aid, Reacher, Long Handled Shoe Horn, Dressing Stick  Level of Assist Required for Toileting: Able to Complete Toileting without Assist  Level of Assist Required for Toilet Transfer: Able to Complete Toilet Transfer without Assist  Equipment for Toilet Transfer: Grab Bars by Toilet  Level of Assist Required for Bathing: Requires Supervision with Bathing  Equipment for Bathing: Shower Chair, Grab Bars in Tub / Shower, Hand Held Shower Head, Long Handled Sponge  Level of Assist Required for Shower Transfer: Able to Complete Shower Transfer without Assist  Equipment for Shower Transfer: Shower Chair, Grab Bars in Tub / Shower  Level of Assist Required for Home Mgmt: Requires Physical Assist with Home Management  Level of Assist Required for Meal Prep: Requires Supervision with Meal Preparation  Driving: May not Drive, Please Contact Physician for Further Information    Mrs Ceja,    It's been a pleasure working with you throughout your tx here at [facility].  Please continue to be mindful of your spinal and back precautions.  Where your LSO, making sure it is on appropriately.  I wish your the best in your continued physical rehabilitation.     Sincerely,    Cali Murdock OTR/L    [facility] NURSING DISCHARGE INSTRUCTIONS    Blood Pressure: 107/67  Weight: 89.5 kg (197 lb 5 oz)  Nursing recommendations for Sari Ceja at time of discharge are as follows:  Client and Family Member verbalized understanding of all discharge instructions and prescriptions.     Review all your home medications and newly ordered medications with your doctor and/or  pharmacist. Follow medication instructions as directed by your doctor and/or pharmacist.    Pain Management:   Discharge Pain Medication Instructions:  Comfort Goal: 2, 3, Comfort with Movement, Sleep Comfortably, Comfort at Rest  Notify your primary care provider if pain is unrelieved with these measures, if the pain is new, or increased in intensity.    Discharge Skin Characteristics:    Discharge Skin Exam:    Surgical Incision  Incision Back (Active)   Wound Bed Pink;Brown 8/9/2018  8:00 PM   Drainage  None 8/9/2018  8:00 PM   Periwound Skin Pink;Normal 8/9/2018  8:00 PM   Daily - Wound Closure Open to Air 8/9/2018  8:00 PM   Dressing Options Open to Air 8/9/2018  8:00 PM   Dressing Status / Change Not Applicable 8/9/2018  8:00 PM   Daily - Dressing Change Observed 8/9/2018  8:00 PM   Dressing Change Frequency As Needed 8/9/2018  8:00 PM   Weekly Photo (Inpatient Only) 08/04/18 8/4/2018  8:55 PM       Wound Skin Tear Back Left Middle (Active)     Skin / Wound Care Instructions: Please contact your primary care physician for any change in skin integrity.     If You Have Surgical Incisions / Wounds:  Monitor surgical site(s) for signs of increased swelling, redness or symptoms of drainage from the site or fever as this could indicate signs and symptoms of infection. If these symptoms are noted, notifiy your primary care provider.      Discharge Safety Instructions: No Supervision Needed     Discharge Safety Concerns: Balance Problems (Dizziness, Light Headedness), Weakness, Unsteady Gait  The interdisciplinary team has made recommendation that you do not require supervision in the house due to weakness and hypotensive at times.  Anti-embolic stockings are not required to increase circulation to the lower extremities.    Discharge Diet: Regular     Discharge Liquids: Thin   Discharge Bowel Function: Continent  Please contact your primary care physician for any changes in bowel habits.  Discharge Bowel Program:     Discharge Bladder Function: Continent  Discharge Urinary Devices: None      Nursing Discharge Plan:   Influenza Vaccine Indication: Not indicated: Previously immunized this influenza season and > 8 years of age    Case Management Discharge Instructions:   Discharge Location: Home with Outpatient Services  Agency Name/Address/Phone: [facility] [address]  [phone]  Home Health: Physical Therapist  Outpatient Services:    DME Provider/Phone: [facility]   [phone]  Medical Equipment Ordered: Four Wheeled Walker  Prescription Faxed to:        Discharge Medication Instructions:  Below are the medications your physician expects you to take upon discharge:          Fall Prevention in the Home  Introduction  Falls can cause injuries. They can happen to people of all ages. There are many things you can do to make your home safe and to help prevent falls.  What can I do on the outside of my home?  · Regularly fix the edges of walkways and driveways and fix any cracks.  · Remove anything that might make you trip as you walk through a door, such as a raised step or threshold.  · Trim any bushes or trees on the path to your home.  · Use bright outdoor lighting.  · Clear any walking paths of anything that might make someone trip, such as rocks or tools.  · Regularly check to see if handrails are loose or broken. Make sure that both sides of any steps have handrails.  · Any raised decks and porches should have guardrails on the edges.  · Have any leaves, snow, or ice cleared regularly.  · Use sand or salt on walking paths during winter.  · Clean up any spills in your garage right away. This includes oil or grease spills.  What can I do in the bathroom?  · Use night lights.  · Install grab bars by the toilet and in the tub and shower. Do not use towel bars as grab bars.  · Use non-skid mats or decals in the tub or shower.  · If you need to sit down in the shower, use a plastic, non-slip  stool.  · Keep the floor dry. Clean up any water that spills on the floor as soon as it happens.  · Remove soap buildup in the tub or shower regularly.  · Attach bath mats securely with double-sided non-slip rug tape.  · Do not have throw rugs and other things on the floor that can make you trip.  What can I do in the bedroom?  · Use night lights.  · Make sure that you have a light by your bed that is easy to reach.  · Do not use any sheets or blankets that are too big for your bed. They should not hang down onto the floor.  · Have a firm chair that has side arms. You can use this for support while you get dressed.  · Do not have throw rugs and other things on the floor that can make you trip.  What can I do in the kitchen?  · Clean up any spills right away.  · Avoid walking on wet floors.  · Keep items that you use a lot in easy-to-reach places.  · If you need to reach something above you, use a strong step stool that has a grab bar.  · Keep electrical cords out of the way.  · Do not use floor polish or wax that makes floors slippery. If you must use wax, use non-skid floor wax.  · Do not have throw rugs and other things on the floor that can make you trip.  What can I do with my stairs?  · Do not leave any items on the stairs.  · Make sure that there are handrails on both sides of the stairs and use them. Fix handrails that are broken or loose. Make sure that handrails are as long as the stairways.  · Check any carpeting to make sure that it is firmly attached to the stairs. Fix any carpet that is loose or worn.  · Avoid having throw rugs at the top or bottom of the stairs. If you do have throw rugs, attach them to the floor with carpet tape.  · Make sure that you have a light switch at the top of the stairs and the bottom of the stairs. If you do not have them, ask someone to add them for you.  What else can I do to help prevent falls?  · Wear shoes that:  ¨ Do not have high heels.  ¨ Have rubber bottoms.  ¨ Are  comfortable and fit you well.  ¨ Are closed at the toe. Do not wear sandals.  · If you use a stepladder:  ¨ Make sure that it is fully opened. Do not climb a closed stepladder.  ¨ Make sure that both sides of the stepladder are locked into place.  ¨ Ask someone to hold it for you, if possible.  · Clearly susie and make sure that you can see:  ¨ Any grab bars or handrails.  ¨ First and last steps.  ¨ Where the edge of each step is.  · Use tools that help you move around (mobility aids) if they are needed. These include:  ¨ Canes.  ¨ Walkers.  ¨ Scooters.  ¨ Crutches.  · Turn on the lights when you go into a dark area. Replace any light bulbs as soon as they burn out.  · Set up your furniture so you have a clear path. Avoid moving your furniture around.  · If any of your floors are uneven, fix them.  · If there are any pets around you, be aware of where they are.  · Review your medicines with your doctor. Some medicines can make you feel dizzy. This can increase your chance of falling.  Ask your doctor what other things that you can do to help prevent falls.  This information is not intended to replace advice given to you by your health care provider. Make sure you discuss any questions you have with your health care provider.  Document Released: 10/14/2010 Document Revised: 05/25/2017 Document Reviewed: 01/22/2016  © 2017 Elsevier        ¨         Oxycodone tablets or capsules  What is this medicine?  OXYCODONE (ox i KOE done) is a pain reliever. It is used to treat moderate to severe pain.  This medicine may be used for other purposes; ask your health care provider or pharmacist if you have questions.  COMMON BRAND NAME(S): Dazidox, Endocodone, Oxaydo, OXECTA, OxyIR, Percolone, Roxicodone, ROXYBOND  What should I tell my health care provider before I take this medicine?  They need to know if you have any of these conditions:  -Dustin's disease  -brain tumor  -head injury  -heart disease  -history of drug or alcohol  abuse problem  -if you often drink alcohol  -kidney disease  -liver disease  -lung or breathing disease, like asthma  -mental illness  -pancreatic disease  -seizures  -thyroid disease  -an unusual or allergic reaction to oxycodone, codeine, hydrocodone, morphine, other medicines, foods, dyes, or preservatives  -pregnant or trying to get pregnant  -breast-feeding  How should I use this medicine?  Take this medicine by mouth with a glass of water. Follow the directions on the prescription label. You can take it with or without food. If it upsets your stomach, take it with food. Take your medicine at regular intervals. Do not take it more often than directed. Do not stop taking except on your doctor's advice.  Some brands of this medicine, like Oxecta, have special instructions. Ask your doctor or pharmacist if these directions are for you: Do not cut, crush or chew this medicine. Swallow only one tablet at a time. Do not wet, soak, or lick the tablet before you take it.  A special MedGuide will be given to you by the pharmacist with each prescription and refill. Be sure to read this information carefully each time.  Talk to your pediatrician regarding the use of this medicine in children. Special care may be needed.  Overdosage: If you think you have taken too much of this medicine contact a poison control center or emergency room at once.  NOTE: This medicine is only for you. Do not share this medicine with others.  What if I miss a dose?  If you miss a dose, take it as soon as you can. If it is almost time for your next dose, take only that dose. Do not take double or extra doses.  What may interact with this medicine?  This medicine may interact with the following medications:  -alcohol  -antihistamines for allergy, cough and cold  -antiviral medicines for HIV or AIDS  -atropine  -certain antibiotics like clarithromycin, erythromycin, linezolid, rifampin  -certain medicines for anxiety or sleep  -certain medicines  for bladder problems like oxybutynin, tolterodine  -certain medicines for depression like amitriptyline, fluoxetine, sertraline  -certain medicines for fungal infections like ketoconazole, itraconazole, voriconazole  -certain medicines for migraine headache like almotriptan, eletriptan, frovatriptan, naratriptan, rizatriptan, sumatriptan, zolmitriptan  -certain medicines for nausea or vomiting like dolasetron, ondansetron, palonosetron  -certain medicines for Parkinson's disease like benztropine, trihexyphenidyl  -certain medicines for seizures like phenobarbital, phenytoin, primidone  -certain medicines for stomach problems like dicyclomine, hyoscyamine  -certain medicines for travel sickness like scopolamine  -diuretics  -general anesthetics like halothane, isoflurane, methoxyflurane, propofol  -ipratropium  -local anesthetics like lidocaine, pramoxine, tetracaine  -MAOIs like Carbex, Eldepryl, Marplan, Nardil, and Parnate  -medicines that relax muscles for surgery  -methylene blue  -nilotinib  -other narcotic medicines for pain or cough  -phenothiazines like chlorpromazine, mesoridazine, prochlorperazine, thioridazine  This list may not describe all possible interactions. Give your health care provider a list of all the medicines, herbs, non-prescription drugs, or dietary supplements you use. Also tell them if you smoke, drink alcohol, or use illegal drugs. Some items may interact with your medicine.  What should I watch for while using this medicine?  Tell your doctor or health care professional if your pain does not go away, if it gets worse, or if you have new or a different type of pain. You may develop tolerance to the medicine. Tolerance means that you will need a higher dose of the medicine for pain relief. Tolerance is normal and is expected if you take this medicine for a long time.  Do not suddenly stop taking your medicine because you may develop a severe reaction. Your body becomes used to the  medicine. This does NOT mean you are addicted. Addiction is a behavior related to getting and using a drug for a non-medical reason. If you have pain, you have a medical reason to take pain medicine. Your doctor will tell you how much medicine to take. If your doctor wants you to stop the medicine, the dose will be slowly lowered over time to avoid any side effects.  There are different types of narcotic medicines (opiates). If you take more than one type at the same time or if you are taking another medicine that also causes drowsiness, you may have more side effects. Give your health care provider a list of all medicines you use. Your doctor will tell you how much medicine to take. Do not take more medicine than directed. Call emergency for help if you have problems breathing or unusual sleepiness.  You may get drowsy or dizzy. Do not drive, use machinery, or do anything that needs mental alertness until you know how the medicine affects you. Do not stand or sit up quickly, especially if you are an older patient. This reduces the risk of dizzy or fainting spells. Alcohol may interfere with the effect of this medicine. Avoid alcoholic drinks.  This medicine will cause constipation. Try to have a bowel movement at least every 2 to 3 days. If you do not have a bowel movement for 3 days, call your doctor or health care professional.  Your mouth may get dry. Chewing sugarless gum or sucking hard candy, and drinking plenty of water may help. Contact your doctor if the problem does not go away or is severe.  What side effects may I notice from receiving this medicine?  Side effects that you should report to your doctor or health care professional as soon as possible:  -allergic reactions like skin rash, itching or hives, swelling of the face, lips, or tongue  -breathing problems  -confusion  -signs and symptoms of low blood pressure like dizziness; feeling faint or lightheaded, falls; unusually weak or tired  -trouble  passing urine or change in the amount of urine  -trouble swallowing  Side effects that usually do not require medical attention (report to your doctor or health care professional if they continue or are bothersome):  -constipation  -dry mouth  -nausea, vomiting  -tiredness  This list may not describe all possible side effects. Call your doctor for medical advice about side effects. You may report side effects to FDA at 3-166-BEF-9117.  Where should I keep my medicine?  Keep out of the reach of children. This medicine can be abused. Keep your medicine in a safe place to protect it from theft. Do not share this medicine with anyone. Selling or giving away this medicine is dangerous and against the law.  Store at room temperature between 15 and 30 degrees C (59 and 86 degrees F). Protect from light. Keep container tightly closed.  This medicine may cause accidental overdose and death if it is taken by other adults, children, or pets. Flush any unused medicine down the toilet to reduce the chance of harm. Do not use the medicine after the expiration date.  NOTE: This sheet is a summary. It may not cover all possible information. If you have questions about this medicine, talk to your doctor, pharmacist, or health care provider.  © 2018 Elsevier/Gold Standard (2016-11-01 16:55:57)      Depression, Adult  Depression is feeling sad, low, down in the dumps, blue, gloomy, or empty. In general, there are two kinds of depression:  · Normal sadness or grief. This can happen after something upsetting. It often goes away on its own within 2 weeks. After losing a loved one (bereavement), normal sadness and grief may last longer than two weeks. It usually gets better with time.  · Clinical depression. This kind lasts longer than normal sadness or grief. It keeps you from doing the things you normally do in life. It is often hard to function at home, work, or at school. It may affect your relationships with others. Treatment is  often needed.  GET HELP RIGHT AWAY IF:  · You have thoughts about hurting yourself or others.  · You lose touch with reality (psychotic symptoms). You may:  ¨ See or hear things that are not real.  ¨ Have untrue beliefs about your life or people around you.  · Your medicine is giving you problems.  MAKE SURE YOU:  · Understand these instructions.  · Will watch your condition.  · Will get help right away if you are not doing well or get worse.     This information is not intended to replace advice given to you by your health care provider. Make sure you discuss any questions you have with your health care provider.     Document Released: 01/20/2012 Document Revised: 01/08/2016 Document Reviewed: 04/18/2013  [employer] Interactive Patient Education ©2016 [employer]    Laminectomy, Care After  Refer to this sheet in the next few weeks. These instructions provide you with information about caring for yourself after your procedure. Your health care provider may also give you more specific instructions. Your treatment has been planned according to current medical practices, but problems sometimes occur. Call your health care provider if you have any problems or questions after your procedure.  WHAT TO EXPECT AFTER THE PROCEDURE  After your procedure, it is common to have some pain around the incision.  HOME CARE INSTRUCTIONS  Bathing  · You may shower after the bandage (dressing) has been removed or as directed by your health care provider.  · Do not take baths, swim, or use a hot tub for 2 weeks or until your incision has healed completely. Check with your health care provider before you start any of these activities.  Incision Care  · There are many different ways to close and cover an incision, including stitches, skin glue, and adhesive strips. Follow instructions from your health care provider about:  ¨ Incision care.  ¨ Dressing changes and removal.  ¨ Incision closure removal.  · Check your incision area every day  for signs of infection. Watch for:  ¨ Redness, swelling, or pain.  ¨ Fluid, blood, or pus.  Activity  · Return to your normal activities as directed by your health care provider. Ask your health care provider what activities are safe for you.  · Perform breathing exercises if directed by your health care provider.  · Avoid bending or twisting at your waist. Always bend at your knees.  · Do not sit for more than 20-30 minutes at a time. Lie down or walk between periods of sitting.  · Do not lift anything that is heavier than 10 lb (4.5 kg).  · Do not drive for 2 weeks after your procedure or as directed by your health care provider. You may be a passenger for 20-30 minute trips.  · Do not drive or operate heavy machinery while taking pain medicine.  General Instructions  · Take medicines only as directed by your health care provider.  · Keep all follow-up visits as directed by your health care provider. This is important.  SEEK MEDICAL CARE IF:  · You have redness, swelling, or increasing pain in the area of your incision.  · You notice a bad smell coming from the incision or dressing.  · You are not able to return to activities or perform exercises as instructed by your health care provider.  SEEK IMMEDIATE MEDICAL CARE IF:  · You develop a rash.  · You have fluid, blood, or pus coming from your incision.  · You have chills or a fever.  · You have episodes of dizziness or fainting while standing.  · You develop shortness of breath or you have difficulty breathing.  · You lose the ability to control your bladder or bowel.  · You become weak.  · You cannot use your legs.     This information is not intended to replace advice given to you by your health care provider. Make sure you discuss any questions you have with your health care provider.     Document Released: 07/07/2006 Document Revised: 05/03/2016 Document Reviewed: 12/14/2015  [employer] Interactive Patient Education ©2016 [employer]          Physical Therapy  Discharge Instructions for Sari Ceja    8/10/2018    Weight Bearing Status - Patient Should:  (none)  Level of Assist Required for Ambulation: No Assist on Flat Surfaces, Supervision on Curbs, Supervision on Stairs  Distance Patient May Ambulate: community distances  Device Recommended for Ambulation: 4-Wheeled Walker  Level of Assist Required to Propel Wheelchair: Requires No Assist  Level of Assist Required for Transfers: Requires No Assist  Device Recommended for Transfers: 4-Wheeled Walker  Home Exercise Program: Refer to Home Exercise Program Handout for Details  Prosthesis / Orthosis Recommendation / Location: Both Legs (B AFO's)    It was a pleasure working with you.  Please continue using your walker along with your back brace (LSO) and both ankle braces (AFOs).  Please continue with your physical therapies and ask for help from your family as needed.  We will miss you and wish you nothing but the best!  --[facility], PTA

## 2018-08-27 ENCOUNTER — NON-PROVIDER VISIT (OUTPATIENT)
Dept: URGENT CARE | Facility: PHYSICIAN GROUP | Age: 53
End: 2018-08-27
Payer: MEDICAID

## 2018-08-27 ENCOUNTER — APPOINTMENT (OUTPATIENT)
Dept: RADIOLOGY | Facility: IMAGING CENTER | Age: 53
End: 2018-08-27
Attending: CLINICAL NURSE SPECIALIST
Payer: MEDICAID

## 2018-08-27 DIAGNOSIS — M54.16 LUMBAR RADICULOPATHY: ICD-10-CM

## 2018-08-27 PROCEDURE — 72100 X-RAY EXAM L-S SPINE 2/3 VWS: CPT | Performed by: FAMILY MEDICINE

## 2019-02-20 NOTE — PROGRESS NOTES
Received shift report and assumed care of patient.  Patient awake, calm and stable, currently positioned in bed for comfort and safety; call light within reach.  Denies pain or discomfort at this time.  Will continue to monitor.   None known

## (undated) DEVICE — TOOL DISSECT MATCH HEAD

## (undated) DEVICE — HEADREST PRONEVIEW LARGE - (10/CA)

## (undated) DEVICE — NEPTUNE 4 PORT MANIFOLD - (20/PK)

## (undated) DEVICE — KIT SURGIFLO W/OUT THROMBIN - (6EA/CA)

## (undated) DEVICE — GLOVE BIOGEL PI INDICATOR SZ 7.0 SURGICAL PF LF - (50/BX 4BX/CA)

## (undated) DEVICE — PEN SKIN MARKER W/RULER - (50EA/BX)

## (undated) DEVICE — BOVIE BLADE COATED &INSULATED - 25/PK

## (undated) DEVICE — DERMABOND ADVANCED - (12EA/BX)

## (undated) DEVICE — ARMREST CRADLE FOAM - (2PR/PK 12PR/CA)

## (undated) DEVICE — LACTATED RINGERS INJ 1000 ML - (14EA/CA 60CA/PF)

## (undated) DEVICE — GLOVE BIOGEL PI ORTHO SZ 6 1/2 SURGICAL PF LF (40PR/BX)

## (undated) DEVICE — CHLORAPREP 26 ML APPLICATOR - ORANGE TINT(25/CA)

## (undated) DEVICE — DRAPE STRLE REG TOWEL 18X24 - (10/BX 4BX/CA)"

## (undated) DEVICE — CANISTER SUCTION 3000ML MECHANICAL FILTER AUTO SHUTOFF MEDI-VAC NONSTERILE LF DISP  (40EA/CA)

## (undated) DEVICE — MIDAS LUBRICATOR DIFFUSER PACK (4EA/CA)

## (undated) DEVICE — DRESSING TRANSPARENT FILM TEGADERM 4 X 4.75" (50EA/BX)"

## (undated) DEVICE — TUBING CLEARLINK DUO-VENT - C-FLO (48EA/CA)

## (undated) DEVICE — SYRINGE SAFETY 5 ML 18 GA X 1-1/2 BLUNT LL (100/BX 4BX/CA)

## (undated) DEVICE — TRAY CATHETER FOLEY URINE METER W/STATLOCK 350ML (10EA/CA)

## (undated) DEVICE — DRAPE LAPAROTOMY T SHEET - (12EA/CA)

## (undated) DEVICE — SUTURE 4-0 MONOCRYL PLUS PS-2 - 27 INCH (36/BX)

## (undated) DEVICE — KIT ANESTHESIA W/CIRCUIT & 3/LT BAG W/FILTER (20EA/CA)

## (undated) DEVICE — SPONGE GAUZESTER. 2X2 4-PL - (2/PK 50PK/BX 30BX/CS)

## (undated) DEVICE — ELECTRODE 850 FOAM ADHESIVE - HYDROGEL RADIOTRNSPRNT (50/PK)

## (undated) DEVICE — SUTURE 1 VICRYL PLUS CTX - 8 X 18 INCH (12/BX)

## (undated) DEVICE — BLADE SURGICAL CLIPPER - (50EA/CA)

## (undated) DEVICE — GLOVE BIOGEL INDICATOR SZ 7SURGICAL PF LTX - (50/BX 4BX/CA)

## (undated) DEVICE — PROTECTOR ULNA NERVE - (36PR/CA)

## (undated) DEVICE — GOWN WARMING STANDARD FLEX - (30/CA)

## (undated) DEVICE — SET EXTENSION WITH 2 PORTS (48EA/CA) ***PART #2C8610 IS A SUBSTITUTE*****

## (undated) DEVICE — SODIUM CHL IRRIGATION 0.9% 1000ML (12EA/CA)

## (undated) DEVICE — NEEDLE FILTER ASPIRATION 18 GA X 1 1/2 IN (100EA/BX)

## (undated) DEVICE — GLOVE BIOGEL SZ 8 SURGICAL PF LTX - (50PR/BX 4BX/CA)

## (undated) DEVICE — SENSOR SPO2 NEO LNCS ADHESIVE (20/BX) SEE USER NOTES

## (undated) DEVICE — PACK NEURO - (2EA/CA)

## (undated) DEVICE — ELECTRODE DUAL RETURN W/ CORD - (50/PK)

## (undated) DEVICE — KIT ROOM DECONTAMINATION

## (undated) DEVICE — GOWN SURGICAL XX-LARGE - (28EA/CA) SIRUS NON REINFORCED

## (undated) DEVICE — SYRINGE ASEPTO - (50EA/CA

## (undated) DEVICE — TUBE E-T HI-LO CUFF 7.0MM (10EA/PK)

## (undated) DEVICE — SET LEADWIRE 5 LEAD BEDSIDE DISPOSABLE ECG (1SET OF 5/EA)

## (undated) DEVICE — GLOVE BIOGEL SZ 8.5 SURGICAL PF LTX - (50PR/BX 4BX/CA)

## (undated) DEVICE — GLOVE BIOGEL INDICATOR SZ 8 SURGICAL PF LTX - (50/BX 4BX/CA)

## (undated) DEVICE — SUTURE GENERAL

## (undated) DEVICE — MASK ANESTHESIA ADULT  - (100/CA)

## (undated) DEVICE — GLOVE BIOGEL SZ 6.5 SURGICAL PF LTX (50PR/BX 4BX/CA)

## (undated) DEVICE — SLEEVE, VASO, THIGH, MED

## (undated) DEVICE — PACK JACKSON TABLE KIT W/OUT - HR (6EA/CA)

## (undated) DEVICE — SOD. CHL. INJ. 0.9% 1000 ML - (14EA/CA 60CA/PF)

## (undated) DEVICE — TUBING C&T SET FLYING LEADS DRAIN TUBING (10EA/BX)

## (undated) DEVICE — SYRINGE SAFETY 10 ML 18 GA X 1 1/2 BLUNT LL (100/BX 4BX/CA)

## (undated) DEVICE — SUTURE 0 VICRYL PLUS CT-2 - 8 X 18 INCH (12/BX)

## (undated) DEVICE — SUCTION INSTRUMENT YANKAUER BULBOUS TIP W/O VENT (50EA/CA)

## (undated) DEVICE — KIT EVACUATER 3 SPRING PVC LF 1/8 DRAIN SIZE (10EA/CA)"